# Patient Record
Sex: FEMALE | Race: BLACK OR AFRICAN AMERICAN | Employment: UNEMPLOYED | URBAN - METROPOLITAN AREA
[De-identification: names, ages, dates, MRNs, and addresses within clinical notes are randomized per-mention and may not be internally consistent; named-entity substitution may affect disease eponyms.]

---

## 2017-01-05 ENCOUNTER — TELEPHONE (OUTPATIENT)
Dept: INTERNAL MEDICINE CLINIC | Age: 47
End: 2017-01-05

## 2017-01-05 DIAGNOSIS — K13.0 CHEILITIS: Primary | ICD-10-CM

## 2017-01-06 RX ORDER — NYSTATIN AND TRIAMCINOLONE ACETONIDE 100000; 1 [USP'U]/G; MG/G
CREAM TOPICAL 2 TIMES DAILY
Qty: 15 G | Refills: 0 | Status: SHIPPED | OUTPATIENT
Start: 2017-01-06 | End: 2017-01-09 | Stop reason: SDUPTHER

## 2017-01-06 NOTE — TELEPHONE ENCOUNTER
PT aware of message below, she has tried chapstick and vaseline, she said its red and flaky, irritated. Can she try nystatin and triamc cream maybe? Angular cheilitis?

## 2017-01-09 DIAGNOSIS — K13.0 CHEILITIS: ICD-10-CM

## 2017-01-09 RX ORDER — NYSTATIN AND TRIAMCINOLONE ACETONIDE 100000; 1 [USP'U]/G; MG/G
CREAM TOPICAL 2 TIMES DAILY
Qty: 15 G | Refills: 0 | Status: SHIPPED | OUTPATIENT
Start: 2017-01-09 | End: 2019-03-22

## 2017-01-13 NOTE — TELEPHONE ENCOUNTER
Pt called stated she received a call from office today?   Pt would like to get script from pharmacy today

## 2017-01-13 NOTE — TELEPHONE ENCOUNTER
Called [facility], notified Mycolog II script for 15g is a 30 day supply. They have this ready, has been ready for 5 days for her. They has already approved it. LM with family member that her script is ready at [facility].

## 2017-01-22 RX ORDER — METFORMIN HYDROCHLORIDE 500 MG/1
TABLET, EXTENDED RELEASE ORAL
Qty: 180 TAB | Refills: 0 | Status: SHIPPED | OUTPATIENT
Start: 2017-01-22 | End: 2017-10-23 | Stop reason: SDUPTHER

## 2017-01-26 ENCOUNTER — TELEPHONE (OUTPATIENT)
Dept: INTERNAL MEDICINE CLINIC | Age: 47
End: 2017-01-26

## 2017-01-29 RX ORDER — METFORMIN HYDROCHLORIDE 500 MG/1
TABLET, EXTENDED RELEASE ORAL
Qty: 180 TAB | Refills: 0 | Status: SHIPPED | OUTPATIENT
Start: 2017-01-29 | End: 2017-10-23 | Stop reason: SDUPTHER

## 2017-03-31 ENCOUNTER — HOSPITAL ENCOUNTER (OUTPATIENT)
Dept: CT IMAGING | Age: 47
Discharge: HOME | End: 2017-03-31
Attending: OBSTETRICS & GYNECOLOGY
Payer: COMMERCIAL

## 2017-03-31 DIAGNOSIS — K40.90 INGUINAL HERNIA: ICD-10-CM

## 2017-03-31 LAB — CREAT UR-MCNC: 0.6 MG/DL (ref 0.6–1.3)

## 2017-03-31 PROCEDURE — 82565 ASSAY OF CREATININE: CPT

## 2017-03-31 PROCEDURE — 74011636320 HC RX REV CODE- 636/320: Performed by: OBSTETRICS & GYNECOLOGY

## 2017-03-31 PROCEDURE — 72193 CT PELVIS W/DYE: CPT

## 2017-03-31 RX ADMIN — IOPAMIDOL 100 ML: 612 INJECTION, SOLUTION INTRAVENOUS at 11:00

## 2017-07-06 ENCOUNTER — TELEPHONE (OUTPATIENT)
Dept: INTERNAL MEDICINE CLINIC | Age: 47
End: 2017-07-06

## 2017-07-06 NOTE — TELEPHONE ENCOUNTER
Backordered- can't get [id number]- can they split and do atenolol 50mg  And take 2 - and chlorthalidone dispensed  Separately. Please advise.

## 2017-07-07 RX ORDER — ATENOLOL AND CHLORTHALIDONE TABLET 100; 25 MG/1; MG/1
TABLET ORAL
Qty: 90 TAB | Refills: 3 | Status: SHIPPED | OUTPATIENT
Start: 2017-07-07 | End: 2017-11-10 | Stop reason: SDUPTHER

## 2017-07-07 RX ORDER — POTASSIUM CHLORIDE 1500 MG/1
TABLET, EXTENDED RELEASE ORAL
Qty: 90 TAB | Refills: 0 | Status: SHIPPED | OUTPATIENT
Start: 2017-07-07 | End: 2017-10-23 | Stop reason: SDUPTHER

## 2017-07-10 RX ORDER — POTASSIUM CHLORIDE 1500 MG/1
TABLET, EXTENDED RELEASE ORAL
Qty: 90 TAB | Refills: 0 | Status: SHIPPED | OUTPATIENT
Start: 2017-07-10 | End: 2017-10-23 | Stop reason: SDUPTHER

## 2017-07-11 NOTE — TELEPHONE ENCOUNTER
Spoke with pharmacist at [facility]. Called in script for Atenolol 50mg, take 2 daily, #180 and Chlorthalidone 25mg, take 1 daily #90. No refills as hopefully medication will come in within the next 90 days and patient can go back to taking combination pill.

## 2017-07-11 NOTE — TELEPHONE ENCOUNTER
[facility]:   Tenoretic 100-25mg is on backorder. Atenolol 100mg is on backorder. Can Atenolol 50mg 2QD #180 tabs and Chlorthalidone 25mg 1QD #90 tabs instead?

## 2017-10-16 ENCOUNTER — TELEPHONE (OUTPATIENT)
Dept: INTERNAL MEDICINE CLINIC | Age: 47
End: 2017-10-16

## 2017-10-16 ENCOUNTER — OFFICE VISIT (OUTPATIENT)
Dept: INTERNAL MEDICINE CLINIC | Age: 47
End: 2017-10-16

## 2017-10-16 DIAGNOSIS — Z12.31 SCREENING MAMMOGRAM, ENCOUNTER FOR: ICD-10-CM

## 2017-10-19 RX ORDER — CHLORTHALIDONE 25 MG/1
TABLET ORAL
Qty: 90 TAB | Refills: 0 | Status: SHIPPED | OUTPATIENT
Start: 2017-10-19 | End: 2018-01-24 | Stop reason: SDUPTHER

## 2017-10-19 RX ORDER — GLIPIZIDE 10 MG/1
TABLET ORAL
Qty: 180 TAB | Refills: 3 | Status: SHIPPED | OUTPATIENT
Start: 2017-10-19 | End: 2018-03-23 | Stop reason: ALTCHOICE

## 2017-10-23 ENCOUNTER — OFFICE VISIT (OUTPATIENT)
Dept: INTERNAL MEDICINE CLINIC | Age: 47
End: 2017-10-23

## 2017-10-23 VITALS
HEART RATE: 93 BPM | BODY MASS INDEX: 37.73 KG/M2 | HEIGHT: 64 IN | RESPIRATION RATE: 14 BRPM | SYSTOLIC BLOOD PRESSURE: 152 MMHG | DIASTOLIC BLOOD PRESSURE: 96 MMHG | TEMPERATURE: 98.9 F | WEIGHT: 221 LBS | OXYGEN SATURATION: 99 %

## 2017-10-23 DIAGNOSIS — E78.5 DYSLIPIDEMIA: ICD-10-CM

## 2017-10-23 DIAGNOSIS — J45.30 MILD PERSISTENT ASTHMA WITHOUT COMPLICATION: ICD-10-CM

## 2017-10-23 DIAGNOSIS — G47.33 OSA (OBSTRUCTIVE SLEEP APNEA): ICD-10-CM

## 2017-10-23 DIAGNOSIS — I10 ESSENTIAL HYPERTENSION: ICD-10-CM

## 2017-10-23 DIAGNOSIS — E66.9 OBESITY (BMI 30-39.9): ICD-10-CM

## 2017-10-23 DIAGNOSIS — Z00.00 PHYSICAL EXAM: Primary | ICD-10-CM

## 2017-10-23 DIAGNOSIS — F32.A DEPRESSION, UNSPECIFIED DEPRESSION TYPE: ICD-10-CM

## 2017-10-23 DIAGNOSIS — E55.9 HYPOVITAMINOSIS D: ICD-10-CM

## 2017-10-23 RX ORDER — AMLODIPINE BESYLATE 5 MG/1
5 TABLET ORAL DAILY
Qty: 30 TAB | Refills: 5 | Status: SHIPPED | OUTPATIENT
Start: 2017-10-23 | End: 2017-10-25 | Stop reason: DRUGHIGH

## 2017-10-23 RX ORDER — LANOLIN ALCOHOL/MO/W.PET/CERES
1000 CREAM (GRAM) TOPICAL DAILY
COMMUNITY
End: 2019-03-22

## 2017-10-23 RX ORDER — BUDESONIDE AND FORMOTEROL FUMARATE DIHYDRATE 160; 4.5 UG/1; UG/1
2 AEROSOL RESPIRATORY (INHALATION) 2 TIMES DAILY
Qty: 1 INHALER | Refills: 12 | Status: SHIPPED | OUTPATIENT
Start: 2017-10-23 | End: 2017-10-26 | Stop reason: CLARIF

## 2017-10-23 RX ORDER — ATENOLOL 100 MG/1
100 TABLET ORAL DAILY
COMMUNITY
End: 2017-11-10 | Stop reason: SDUPTHER

## 2017-10-23 RX ORDER — ZINC GLUCONATE 10 MG
250 LOZENGE ORAL DAILY
COMMUNITY
End: 2018-07-01

## 2017-10-23 RX ORDER — ALBUTEROL SULFATE 90 UG/1
2 AEROSOL, METERED RESPIRATORY (INHALATION)
Qty: 1 INHALER | Refills: 11 | Status: SHIPPED | OUTPATIENT
Start: 2017-10-23 | End: 2018-06-21 | Stop reason: SDUPTHER

## 2017-10-23 NOTE — PROGRESS NOTES
1. Have you been to the ER, urgent care clinic or hospitalized since your last visit? NO.     2. Have you seen or consulted any other health care providers outside of the [address] [place] [address] since your last visit (Include any pap smears or colon screening)? NO      Do you have an Advanced Directive? NO    Would you like information on Advanced Directives?  NO

## 2017-10-23 NOTE — MR AVS SNAPSHOT
Visit Information Date & Time Provider Department Dept. Phone Encounter #  
 10/23/2017  3:30 PM Alfred Albert MD [facility] [phone] Your Appointments 11/10/2017  3:15 PM  
Office Visit with Alfred Albert MD  
[facility]) Appt Note: 2 weeks [address], [address] [place] [address] [address]  
  
   
 [address], [address] Upcoming Health Maintenance Date Due Pneumococcal 19-64 Medium Risk (1 of 1 - PPSV23) 12/15/1989 DTaP/Tdap/Td series (1 - Tdap) 12/15/1991 FOOT EXAM Q1 9/15/2015 EYE EXAM RETINAL OR DILATED Q1 9/18/2015 PAP AKA CERVICAL CYTOLOGY 2/4/2016 HEMOGLOBIN A1C Q6M 1/1/2017 MICROALBUMIN Q1 7/1/2017 LIPID PANEL Q1 7/1/2017 INFLUENZA AGE 9 TO ADULT 8/1/2017 COLONOSCOPY 6/1/2020 Allergies as of 10/23/2017  Review Complete On: 10/23/2017 By: Shara Chavarria Severity Noted Reaction Type Reactions Penicillins High 10/03/2011    Unknown (comments) Tongue swelling; difficulty swallowing; thrush Compazine [Prochlorperazine]  10/03/2011    Unknown (comments) Paranoia Current Immunizations  Reviewed on 7/29/2013 No immunizations on file. Not reviewed this visit Vitals BP Pulse Temp Resp Height(growth percentile) Weight(growth percentile) (!) 160/110 (BP 1 Location: Right arm, BP Patient Position: Sitting) 93 98.9 °F (37.2 °C) (Oral) 14 5' 4\" (1.626 m) 221 lb (100.2 kg) SpO2 BMI OB Status Smoking Status 99% 37.93 kg/m2 Having regular periods Never Smoker Vitals History BMI and BSA Data Body Mass Index Body Surface Area  
 37.93 kg/m 2 2.13 m 2 Preferred Pharmacy Pharmacy Name Phone [facility] [address] [place] [address] [phone] Your Updated Medication List  
  
   
 This list is accurate as of: 10/23/17  4:22 PM.  Always use your most recent med list.  
  
  
  
  
 albuterol 90 mcg/actuation inhaler Commonly known as:  PROVENTIL HFA, VENTOLIN HFA, PROAIR HFA Take 1-2 Puffs by inhalation every four (4) hours as needed for Wheezing. [employer] CONTOUR strip Generic drug:  glucose blood VI test strips Dx code 250.00  
  
 aspirin 81 mg chewable tablet Take 81 mg by mouth daily. atenolol-chlorthalidone 100-25 mg per tablet Commonly known as:  TENORETIC  
TAKE ONE TABLET BY MOUTH ONCE DAILY  
  
 atorvastatin 20 mg tablet Commonly known as:  LIPITOR  
TAKE ONE TABLET BY MOUTH ONCE DAILY BYDUREON 2 mg Serr Generic drug:  exenatide microspheres INJECT THE CONTENTS OF ONE SYRINGE SUBCUTANEOUSLY ONCE EVERY 7 DAYS  
  
 chlorthalidone 25 mg tablet Commonly known as:  HYGROTEN  
TAKE ONE TABLET BY MOUTH ONCE DAILY  
  
 citalopram 20 mg tablet Commonly known as:  CELEXA  
TAKE ONE TABLET BY MOUTH ONCE DAILY  
  
 cyanocobalamin 1,000 mcg tablet Take 1,000 mcg by mouth daily. glipiZIDE 10 mg tablet Commonly known as:  GLUCOTROL  
TAKE ONE TABLET BY MOUTH TWICE DAILY INVOKANA 300 mg tablet Generic drug:  canagliflozin TAKE ONE TABLET BY MOUTH ONCE DAILY  
  
 KLOR-CON M20 20 mEq tablet Generic drug:  potassium chloride TAKE ONE TABLET BY MOUTH ONCE DAILY losartan 100 mg tablet Commonly known as:  COZAAR  
TAKE ONE TABLET BY MOUTH ONCE DAILY  
  
 magnesium 250 mg Tab Take 250 mg by mouth daily. metFORMIN  mg tablet Commonly known as:  GLUCOPHAGE XR  
TAKE TWO TABLETS BY MOUTH EVERY DAY WITH  DINNER  
  
 nystatin-triamcinolone topical cream  
Commonly known as:  MYCOLOG II Apply  to affected area two (2) times a day. Introducing [facility]! Dear Jose Bunn: Thank you for requesting a MyChart account.   Our records indicate that you have previously registered for a [employer] account but its currently inactive. Please call our [employer] support line at [phone]. Additional Information If you have questions, please visit the Frequently Asked Questions section of the [employer] website at [email or web address] Remember, [employer] is NOT to be used for urgent needs. For medical emergencies, dial 911. Now available from your iPhone and Android! Please provide this summary of care documentation to your next provider. Your primary care clinician is listed as Lalito Hassan. If you have any questions after today's visit, please call [phone].

## 2017-10-23 NOTE — PROGRESS NOTES
55 y.o. BLACK OR  female who presents for evaluation. She has not f/u w us for about a year mainly due to very busy work schedule    Denies any cardiovascular complaints. She does not exercise much although she does take the stairs up to 7 floors at work    She came off the invokana due to recurring yeast infections. These promptly resolved after she stopped. She does not hav e meter so not checking. Diet is way off as they eat out a lot now that the kids are in college    Vitals 10/23/2017 2016 2016 2016 2016   Weight 221 lb 212 lb 3.2 oz 209 lb 212 lb 209 lb     She reports that she's taking her bp meds although we had to break up the atenolol/chlorthalidone due to availability issues.   No headaches, etc.  Has not been checking readings outside    No gi or gu sfx    She has noted some inc wheezing but reports that she ran out of alb and has not used symbicort in some time    Past Medical History:   Diagnosis Date    Acanthosis nigricans     Anxiety     Asthma     Depression     Diabetes mellitus ([address]     Heavy menses 3/12    s/p endometrial ablation 3/16    History of CVA (cerebrovascular accident)     no deficits    History of iron deficiency anemia     Hyperlipidemia     Hypertension     Hypovitaminosis D 10/11    Morbid obesity (HCC)     peak weight 215 lbs, bmi 38.1 from 6/15; delcined dharmesh suppressants, med suprervised wt loss, bariatric options    JOLIE (obstructive sleep apnea)     Dr. Turpin Russ cpap    Perennial allergic rhinitis      Past Surgical History:   Procedure Laterality Date    HX  SECTION      x2    HX COLONOSCOPY      Dr Enriqueta Hartman 6/10 negative    HX GI  6/10    neg EGD Dr Mary Godoy HX GYN      cyst removal    HX GYN      LEEP    HX HYSTEROSCOPY WITH ENDOMETRIAL ABLATION  3/16    Dr Juan Hirsch     Social History     Social History    Marital status:      Spouse name: N/A    Number of children: 2    Years of education: N/A     Occupational History    asst mgr [employer]      Social History Main Topics    Smoking status: Never Smoker    Smokeless tobacco: Never Used    Alcohol use Yes      Comment: social drinker    Drug use: No    Sexual activity: Not on file     Other Topics Concern    Not on file     Social History Narrative     Current Outpatient Prescriptions   Medication Sig    cyanocobalamin 1,000 mcg tablet Take 1,000 mcg by mouth daily.  magnesium 250 mg tab Take 250 mg by mouth daily.  chlorthalidone (HYGROTEN) 25 mg tablet TAKE ONE TABLET BY MOUTH ONCE DAILY    glipiZIDE (GLUCOTROL) 10 mg tablet TAKE ONE TABLET BY MOUTH TWICE DAILY    atenolol-chlorthalidone (TENORETIC) 100-25 mg per tablet TAKE ONE TABLET BY MOUTH ONCE DAILY    BYDUREON 2 mg serr INJECT THE CONTENTS OF ONE SYRINGE SUBCUTANEOUSLY ONCE EVERY 7 DAYS    citalopram (CELEXA) 20 mg tablet TAKE ONE TABLET BY MOUTH ONCE DAILY    atorvastatin (LIPITOR) 20 mg tablet TAKE ONE TABLET BY MOUTH ONCE DAILY    losartan (COZAAR) 100 mg tablet TAKE ONE TABLET BY MOUTH ONCE DAILY    albuterol (PROVENTIL HFA, VENTOLIN HFA, PROAIR HFA) 90 mcg/actuation inhaler Take 1-2 Puffs by inhalation every four (4) hours as needed for Wheezing.  aspirin 81 mg chewable tablet Take 81 mg by mouth daily.  KLOR-CON M20 20 mEq tablet TAKE ONE TABLET BY MOUTH ONCE DAILY    metFORMIN ER (GLUCOPHAGE XR) 500 mg tablet TAKE TWO TABLETS BY MOUTH EVERY DAY WITH  DINNER    nystatin-triamcinolone (MYCOLOG II) topical cream Apply  to affected area two (2) times a day.  INVOKANA 300 mg tablet TAKE ONE TABLET BY MOUTH ONCE DAILY    ASCENSIA CONTOUR strip Dx code 250.00     No current facility-administered medications for this visit.       Allergies   Allergen Reactions    Penicillins Unknown (comments)     Tongue swelling; difficulty swallowing; thrush    Compazine [Prochlorperazine] Unknown (comments)     Paranoia     REVIEW OF SYSTEMS: sees  Adam, saw FNP Vance Citrin 1/14, mammo 2/13  Ophtho - no vision change or eye pain  Oral - no mouth pain, tongue or tooth problems  Ears - no hearing loss, ear pain, fullness, no swallowing problems  Cardiac - no CP, PND, orthopnea, edema, palpitations or syncope  Chest - no breast masses  Resp - no wheezing, chronic coughing, dyspnea  GI - no heartburn, nausea, vomiting, change in bowel habits, bleeding, hemorrhoids  Urinary - no dysuria, hematuria, flank pain, urgency, frequency  Genitals - no genital lesions, discharge, masses, ulceration, warts  Ortho - no swelling, dec ROM, myalgias  Psych - denies any anxiety or depression symptoms, no hallucinations or violent ideation  Constitutional - no wt loss, night sweats, unexplained fevers  Neuro - no focal weakness, numbness, paresthesias, incoordination, ataxia, involuntary movements  Endo - no polyuria, polydipsia, nocturia, hot flashes    Visit Vitals    BP (!) 152/96 (BP 1 Location: Right arm, BP Patient Position: Sitting)    Pulse 93    Temp 98.9 °F (37.2 °C) (Oral)    Resp 14    Ht 5' 4\" (1.626 m)    Wt 221 lb (100.2 kg)    SpO2 99%    BMI 37.93 kg/m2     A&O x3  Affect is appropriate. Mood stable  No apparent distress  Anicteric, no JVD, adenopathy or thyromegaly. No carotid bruits or radiated murmur  Lungs clear to auscultation, no wheezes or rales  Heart showed regular rate and rhythm. No murmur, rubs, gallops  Abdomen soft nontender, no hepatosplenomegaly or masses. Extremities without edema.   Pulses 1-2+ symmetrically    LABS  From 10/11 showed                   hba1c 11.1, ldl-p 2182, chol 166, tg 139, hdl 39, ldl-c 99,                    vit d 16.5  From 12/11 showed gluc 111, cr 0.68,              alt10,                      ldl-p 1980, chol 178, tg 104, hdl 40, ldl-c 117  From 3/12 showed                   hba1c 6.4,                  wbc 4.4, hb 11.0, plt 306, %sat 10, ferritin 20, spep neg, fol 12.5, b12 665, vit d 41.6  From 11/12 showed gluc 196, cr 0.50,              alt 27, hba1c 9.1,   ldl-p 1745, chol 154, tg 146, hdl 41, ldl-c 84  From 7/13 showed   gluc 157, cr 0.50, gfr>60, alt 18, hba1c 8.8,   ldl-p 1017, chol 119, tg 102, hdl 39, ldl-c 60  From 8/14 showed   gluc 137, cr 0.50, gfr>60, alt 15, hba1c 7.3,       chol 118, tg 92,   hdl 34, ldl-c 66,  wbc 3.1, hb 8.9,   plt 236, umar 17.6  From 12/15 showed gluc 124, cr 0.60, gfr>60, alt 23, hba1c 6.7,       chol 120, tg 161, hdl 34, ldl-c 54,  wbc 4.5, hb 10.2, plt 274, vit d 13.9, tsh 2.12  From 5/16 showed   gluc 121, cr 0.70, gfr>60, alt 27  From 7/16 showed        hba1c 7.4,       chol 134, tg 127, hdl 40, ldl-c 69    Patient Active Problem List   Diagnosis Code    Asthma J45.909    Depression and anxiety F32.9    JOLIE and PLMD Dr. Fern Schirmer 2005 G47.33    Hypovitaminosis D E55.9    Dyslipidemia E78.5    Essential hypertension I10    Diabetes mellitus type 2, uncontrolled ([address] E11.65    Obesity (BMI 30-39. 9) E66.9     Assessment and plan:  1. Diabetes. Check labs, tighten up diet, inc exercise, try and lose weight. F/U ophth. Will likely add actos assuming insulin not indicated  2. Hyperlipidemia. Check labs  3. Hypovitaminosis D. Restart supplementation  4. Sleep apnea. F/u Dr. Fern Schirmer  5. Morbid obesity. Not interested in medically supervised wt loss or dharmesh suppressants. 6. Hypertension. Continue current but will add amlo after discussing possible sfx. Sfx discussed  7. Asthma. Restart symbicort, refilled albuterol  8. Referral to Dr Palak Soto given  9. F/U gyn, mammo ordered  10. PVX to be given         RTC 2/18    Above conditions discussed at length and patient vocalized understanding.   All questions answered to patient satifaction

## 2017-10-24 ENCOUNTER — TELEPHONE (OUTPATIENT)
Dept: INTERNAL MEDICINE CLINIC | Age: 47
End: 2017-10-24

## 2017-10-24 ENCOUNTER — HOSPITAL ENCOUNTER (OUTPATIENT)
Dept: LAB | Age: 47
Discharge: HOME | End: 2017-10-24

## 2017-10-24 LAB
AVG GLU, 10930: 216 MG/DL (ref 91–123)
CREATININE, URINE: 211 MG/DL
ERYTHROCYTE [DISTWIDTH] IN BLOOD BY AUTOMATED COUNT: 16.4 % (ref 10–16)
HBA1C MFR BLD HPLC: 9.1 % (ref 4.8–5.9)
HCT VFR BLD AUTO: 35.6 % (ref 35.1–48)
HGB BLD-MCNC: 10.4 G/DL (ref 11.7–16)
MCH RBC QN AUTO: 22 PG (ref 26–34)
MCHC RBC AUTO-ENTMCNC: 29 G/DL (ref 32–36)
MCV RBC AUTO: 76 FL (ref 80–95)
MICROALB/CREAT RATIO, 140286: 72.5 MCG/MG OF CREATININE (ref 0–30)
MICROALBUMIN,URINE RANDOM 140054: 153 UG/ML (ref 0.1–17)
PLATELET # BLD AUTO: 245 K/UL (ref 140–440)
PMV BLD AUTO: 12 FL (ref 6–10.8)
RBC # BLD AUTO: 4.68 M/UL (ref 3.8–5.2)
SENTARA SPECIMEN COL,SENBCF: NORMAL
WBC # BLD AUTO: 3.5 K/UL (ref 4–11)

## 2017-10-24 PROCEDURE — 99001 SPECIMEN HANDLING PT-LAB: CPT | Performed by: INTERNAL MEDICINE

## 2017-10-24 NOTE — TELEPHONE ENCOUNTER
PT called- her work is sending her home because her BP is 168/106- per Eamon Johnson have her start the new BP meds, lay down for awhile and then retake her BP and call us with the results.   They took it a 2nd time at work and it was 164/106

## 2017-10-24 NOTE — TELEPHONE ENCOUNTER
Does not have cuff at home-can't be released back to work till she has it checked and RD says ok- per RD I scheduled with Lopez Cheung for tomorrow

## 2017-10-24 NOTE — TELEPHONE ENCOUNTER
----- Message from Jake Cartagena MD sent at 10/23/2017  6:01 PM EDT -----  pls get gyn records cyrus rowe

## 2017-10-25 ENCOUNTER — OFFICE VISIT (OUTPATIENT)
Dept: INTERNAL MEDICINE CLINIC | Age: 47
End: 2017-10-25

## 2017-10-25 VITALS
TEMPERATURE: 98.3 F | HEIGHT: 64 IN | SYSTOLIC BLOOD PRESSURE: 168 MMHG | WEIGHT: 221 LBS | BODY MASS INDEX: 37.73 KG/M2 | HEART RATE: 85 BPM | DIASTOLIC BLOOD PRESSURE: 110 MMHG | OXYGEN SATURATION: 99 % | RESPIRATION RATE: 14 BRPM

## 2017-10-25 DIAGNOSIS — E66.9 OBESITY (BMI 30-39.9): ICD-10-CM

## 2017-10-25 DIAGNOSIS — E78.5 DYSLIPIDEMIA: ICD-10-CM

## 2017-10-25 DIAGNOSIS — J45.20 MILD INTERMITTENT ASTHMA WITHOUT COMPLICATION: ICD-10-CM

## 2017-10-25 DIAGNOSIS — G47.33 OSA (OBSTRUCTIVE SLEEP APNEA): ICD-10-CM

## 2017-10-25 DIAGNOSIS — I10 ESSENTIAL HYPERTENSION: Primary | ICD-10-CM

## 2017-10-25 LAB
25(OH)D3 SERPL-MCNC: 12.3 NG/ML (ref 32–100)
A-G RATIO,AGRAT: 1.3 RATIO (ref 1.1–2.6)
ALBUMIN SERPL-MCNC: 4.2 G/DL (ref 3.5–5)
ALP SERPL-CCNC: 77 U/L (ref 25–115)
ALT SERPL-CCNC: 32 U/L (ref 5–40)
ANION GAP SERPL CALC-SCNC: 18 MMOL/L
AST SERPL W P-5'-P-CCNC: 21 U/L (ref 10–37)
BILIRUB SERPL-MCNC: 0.2 MG/DL (ref 0.2–1.2)
BUN SERPL-MCNC: 13 MG/DL (ref 6–22)
CALCIUM SERPL-MCNC: 9.3 MG/DL (ref 8.4–10.5)
CHLORIDE SERPL-SCNC: 94 MMOL/L (ref 98–110)
CHOLEST SERPL-MCNC: 143 MG/DL (ref 110–200)
CO2 SERPL-SCNC: 25 MMOL/L (ref 20–32)
CREAT SERPL-MCNC: 0.6 MG/DL (ref 0.5–1.2)
GFRAA, 66117: >60
GFRNA, 66118: >60
GLOBULIN,GLOB: 3.2 G/DL (ref 2–4)
GLUCOSE SERPL-MCNC: 260 MG/DL (ref 70–99)
HDLC SERPL-MCNC: 33 MG/DL (ref 40–59)
LDLC SERPL CALC-MCNC: 74 MG/DL (ref 50–99)
POTASSIUM SERPL-SCNC: 3.5 MMOL/L (ref 3.5–5.5)
PROT SERPL-MCNC: 7.4 G/DL (ref 6.4–8.3)
SODIUM SERPL-SCNC: 137 MMOL/L (ref 133–145)
TRIGL SERPL-MCNC: 180 MG/DL (ref 40–149)
VLDLC SERPL CALC-MCNC: 36 MG/DL (ref 8–30)

## 2017-10-25 RX ORDER — AMLODIPINE BESYLATE 5 MG/1
10 TABLET ORAL DAILY
COMMUNITY
End: 2017-11-08 | Stop reason: SDUPTHER

## 2017-10-25 RX ORDER — METFORMIN HYDROCHLORIDE 500 MG/1
1000 TABLET, EXTENDED RELEASE ORAL
Qty: 180 TAB | Refills: 0 | Status: SHIPPED | OUTPATIENT
Start: 2017-10-25 | End: 2020-10-29

## 2017-10-25 NOTE — LETTER
NOTIFICATION RETURN TO WORK / SCHOOL 
 
10/25/2017 11:39 AM 
 
Ms. Maritza Ramon [address] [place] [address],[address] [facility] [id number] To Whom It May Concern: 
 
Maritza Ramon is currently under the care of Kelley Denis. Pt is medically stable to return to work. If there are questions or concerns please have the patient contact our office. Sincerely, EFRAÍN Alexandra

## 2017-10-25 NOTE — PROGRESS NOTES
1. Have you been to the ER, urgent care clinic or hospitalized since your last visit? NO.     2. Have you seen or consulted any other health care providers outside of the [address] [place] [address] since your last visit (Include any pap smears or colon screening)? NO      Do you have an Advanced Directive? NO    Would you like information on Advanced Directives?  NO

## 2017-10-25 NOTE — PROGRESS NOTES
HPI/History  Brooks Diana is a 55 y.o.  female who presents for evaluation. Pt accompanied by . Pt with HTN and recently undergoing adjustments due to elevated pressures. Saw Dr. Julia Lopez 10/23 where norvasc 5mg was added to her regimen of atenolol, chlorthalidone, and losartan. Pt is still using atenolol/chlorthalidone as a combo and HAS NOT had availability issues as of yet per report; dosing noted below, including losartan dosing. Pt started norvasc yesterday. Was sent home from work due to BP of 168/106 then 164/106. She needs evaluation and note in order to return. She denies any cardiac complaints. Has had some wheezing for which albuterol and symbicort were provided, she has used these in the past but has not required in some time. She was able to fill albuterol but reports encountering insurance issues with symbicort but details unknown. No other sxs or complaints. She has a previously scheduled f/u including review of recent labs with Dr. Julia Lopez on 11/10. Of note, lifestyle and diet could use improvement. Pt with weight gain. Admits to salt laden meals and adding salt to meals. Patient Active Problem List   Diagnosis Code    Asthma J45.909    Depression and anxiety F32.9    JOLIE and PLMD Dr. Evelin Posada 2005 G47.33    Hypovitaminosis D E55.9    Dyslipidemia E78.5    Essential hypertension I10    Diabetes mellitus type 2, uncontrolled ([facility] E11.65    Obesity (BMI 30-39. 9) E66.9     Past Medical History:   Diagnosis Date    Acanthosis nigricans 7/13    Anxiety     Asthma     Depression     Diabetes mellitus ([facility]     Heavy menses 3/12    s/p endometrial ablation 3/16    History of CVA (cerebrovascular accident) 1996    no deficits    History of iron deficiency anemia     Hyperlipidemia     Hypertension     Hypovitaminosis D 10/11    Morbid obesity (HCC)     peak weight 215 lbs, bmi 38.1 from 6/15; delcined dharmesh suppressants, med suprervised wt loss, bariatric options    JOLIE (obstructive sleep apnea)     Dr. Dania Pardo cpap    Perennial allergic rhinitis      Past Surgical History:   Procedure Laterality Date    HX  SECTION      x2    HX COLONOSCOPY      Dr Gilliland  6/10 negative    HX GI  6/10    neg EGD Dr Chan Hicks HX GYN      cyst removal    HX GYN      LEEP    HX HYSTEROSCOPY WITH ENDOMETRIAL ABLATION  3/16    Dr Barney Frank     Social History     Social History    Marital status:      Spouse name: N/A    Number of children: 2    Years of education: N/A     Occupational History    works at [employer] History Main Topics    Smoking status: Never Smoker    Smokeless tobacco: Never Used    Alcohol use Yes      Comment: social drinker    Drug use: No    Sexual activity: Not on file     Other Topics Concern    Not on file     Social History Narrative     Family History   Problem Relation Age of Onset    Diabetes Mother      Current Outpatient Prescriptions   Medication Sig    cyanocobalamin 1,000 mcg tablet Take 1,000 mcg by mouth daily.  magnesium 250 mg tab Take 250 mg by mouth daily.  amLODIPine (NORVASC) 5 mg tablet Take 1 Tab by mouth daily.  budesonide-formoterol (SYMBICORT) 160-4.5 mcg/actuation HFAA Take 2 Puffs by inhalation two (2) times a day.  albuterol (PROVENTIL HFA, VENTOLIN HFA, PROAIR HFA) 90 mcg/actuation inhaler Take 2 Puffs by inhalation every six (6) hours as needed for Wheezing.  chlorthalidone (HYGROTEN) 25 mg tablet TAKE ONE TABLET BY MOUTH ONCE DAILY    glipiZIDE (GLUCOTROL) 10 mg tablet TAKE ONE TABLET BY MOUTH TWICE DAILY    atenolol-chlorthalidone (TENORETIC) 100-25 mg per tablet TAKE ONE TABLET BY MOUTH ONCE DAILY    BYDUREON 2 mg serr INJECT THE CONTENTS OF ONE SYRINGE SUBCUTANEOUSLY ONCE EVERY 7 DAYS    nystatin-triamcinolone (MYCOLOG II) topical cream Apply  to affected area two (2) times a day.     citalopram (CELEXA) 20 mg tablet TAKE ONE TABLET BY MOUTH ONCE DAILY    atorvastatin (LIPITOR) 20 mg tablet TAKE ONE TABLET BY MOUTH ONCE DAILY    losartan (COZAAR) 100 mg tablet TAKE ONE TABLET BY MOUTH ONCE DAILY    aspirin 81 mg chewable tablet Take 81 mg by mouth daily.  KLOR-CON M20 20 mEq tablet TAKE ONE TABLET BY MOUTH ONCE DAILY    metFORMIN ER (GLUCOPHAGE XR) 500 mg tablet TAKE TWO TABLETS BY MOUTH EVERY DAY WITH  DINNER    atenolol (TENORMIN) 100 mg tablet Take 100 mg by mouth daily. No current facility-administered medications for this visit. Allergies   Allergen Reactions    Penicillins Unknown (comments)     Tongue swelling; difficulty swallowing; thrush    Compazine [Prochlorperazine] Unknown (comments)     Paranoia    Invokana [Canagliflozin] Other (comments)     Yeast infections       Review of Systems  Aside from those included in HPI, remainder of complete ROS negative. Physical Examination  Visit Vitals    BP (!) 168/110    Pulse 85    Temp 98.3 °F (36.8 °C)    Resp 14    Ht 5' 4\" (1.626 m)    Wt 221 lb (100.2 kg)    SpO2 99%    BMI 37.93 kg/m2       General - Alert and in no acute distress. Pt appears well, comfortable, and in good spirits. Pleasant, engaging. Nontoxic. Not anxious, non-diaphoretic. Mental status - Appropriate mood, behavior, speech content, dress, and thought processes. Eyes - Pupils equal and reactive, extraocular movements intact. No erythema or discharge. Pulm - No tachypnea, retractions, or cyanosis. Good respiratory effort. Clear to auscultation bilat. No appreciable wheezes, rales, or rhonchi. Cardiovascular - Normal rate, regular rhythm. No appreciable murmurs or gallops. Assessment and Plan  1. HTN with recent elevations - Continue atenolol, chlorthalidone, and losartan. Use combo atenolol/chlorathalidone or as individuals as availability dictates. She only started norvasc yesterday but will likely require increased dose. She will increase to 10mg norvasc daily. She will monitor BP.  She will work on [address] including diet, sodium intake, and wt loss. She will keep previously scheduled appt with lab review and recheck with Dr. Agnes Nails on 11/10. I have provided a return to work note. 2. Obesity and weight related comorbidities (see problem list) - TLC and wt loss discussed at length along with relation to other conditions. 3. Asthma - able to fill albuterol but encountered issues (insurance) with symbicort. Will have nurses look into the issue. Further planning as warranted. Pt and  happily agree with plan. PLEASE NOTE:   This document has been produced using voice recognition software. Unrecognized errors in transcription may be present.     Page Dalia [employer] Prepared Response [facility]  [phone]  10/25/2017

## 2017-10-25 NOTE — MR AVS SNAPSHOT
Visit Information Date & Time Provider Department Dept. Phone Encounter #  
 10/25/2017 11:30 AM Eugene Ceron [facility] [address] [place] [address] [phone] Your Appointments 11/10/2017  3:15 PM  
Office Visit with Wendy Akers MD  
[facility] of [address] [place] [address] [facility]) Appt Note: 2 weeks [address], [address] [address] [address] [place]  
  
   
 [address], [address] Upcoming Health Maintenance Date Due Pneumococcal 19-64 Medium Risk (1 of 1 - PPSV23) 12/15/1989 DTaP/Tdap/Td series (1 - Tdap) 12/15/1991 FOOT EXAM Q1 9/15/2015 EYE EXAM RETINAL OR DILATED Q1 9/18/2015 PAP AKA CERVICAL CYTOLOGY 2/4/2016 HEMOGLOBIN A1C Q6M 1/1/2017 MICROALBUMIN Q1 7/1/2017 LIPID PANEL Q1 7/1/2017 COLONOSCOPY 6/1/2020 Allergies as of 10/25/2017  Review Complete On: 10/25/2017 By: EFRAÍN Ceron Severity Noted Reaction Type Reactions Penicillins High 10/03/2011    Unknown (comments) Tongue swelling; difficulty swallowing; thrush Compazine [Prochlorperazine]  10/03/2011    Unknown (comments) Paranoia Invokana [Canagliflozin]  06/20/2016    Other (comments) Yeast infections Current Immunizations  Reviewed on 10/23/2017 Name Date Influenza Vaccine 10/1/2017, 10/1/2016, 10/1/2015 Not reviewed this visit Vitals BP Pulse Temp Resp Height(growth percentile) Weight(growth percentile) (!) 168/110 85 98.3 °F (36.8 °C) 14 5' 4\" (1.626 m) 221 lb (100.2 kg) SpO2 BMI OB Status Smoking Status 99% 37.93 kg/m2 Having regular periods Never Smoker Vitals History BMI and BSA Data Body Mass Index Body Surface Area  
 37.93 kg/m 2 2.13 m 2 Preferred Pharmacy Pharmacy Name Phone [facility] [address] [place] [address], [address] [place] [address] Avenue [phone] Your Updated Medication List  
  
   
 This list is accurate as of: 10/25/17 11:43 AM.  Always use your most recent med list.  
  
  
  
  
 albuterol 90 mcg/actuation inhaler Commonly known as:  PROVENTIL HFA, VENTOLIN HFA, PROAIR HFA Take 2 Puffs by inhalation every six (6) hours as needed for Wheezing. amLODIPine 5 mg tablet Commonly known as:  Wana Soo Take 1 Tab by mouth daily. aspirin 81 mg chewable tablet Take 81 mg by mouth daily. atenolol 100 mg tablet Commonly known as:  TENORMIN Take 100 mg by mouth daily. atenolol-chlorthalidone 100-25 mg per tablet Commonly known as:  TENORETIC  
TAKE ONE TABLET BY MOUTH ONCE DAILY  
  
 atorvastatin 20 mg tablet Commonly known as:  LIPITOR  
TAKE ONE TABLET BY MOUTH ONCE DAILY  
  
 budesonide-formoterol 160-4.5 mcg/actuation Hfaa Commonly known as:  SYMBICORT Take 2 Puffs by inhalation two (2) times a day. BYDUREON 2 mg Serr Generic drug:  exenatide microspheres INJECT THE CONTENTS OF ONE SYRINGE SUBCUTANEOUSLY ONCE EVERY 7 DAYS  
  
 chlorthalidone 25 mg tablet Commonly known as:  HYGROTEN  
TAKE ONE TABLET BY MOUTH ONCE DAILY  
  
 citalopram 20 mg tablet Commonly known as:  CELEXA  
TAKE ONE TABLET BY MOUTH ONCE DAILY  
  
 cyanocobalamin 1,000 mcg tablet Take 1,000 mcg by mouth daily. glipiZIDE 10 mg tablet Commonly known as:  GLUCOTROL  
TAKE ONE TABLET BY MOUTH TWICE DAILY  
  
 KLOR-CON M20 20 mEq tablet Generic drug:  potassium chloride TAKE ONE TABLET BY MOUTH ONCE DAILY losartan 100 mg tablet Commonly known as:  COZAAR  
TAKE ONE TABLET BY MOUTH ONCE DAILY  
  
 magnesium 250 mg Tab Take 250 mg by mouth daily. metFORMIN  mg tablet Commonly known as:  GLUCOPHAGE XR  
TAKE TWO TABLETS BY MOUTH EVERY DAY WITH  DINNER  
  
 nystatin-triamcinolone topical cream  
Commonly known as:  MYCOLOG II Apply  to affected area two (2) times a day. Introducing [facility]!    
 Dear Gardner Riedel: 
 Thank you for requesting a [employer] account. Our records indicate that you have previously registered for a [employer] account but its currently inactive. Please call our [employer] support line at [phone]. Additional Information If you have questions, please visit the Frequently Asked Questions section of the [employer] website at [email or web address] Remember, CrowdTangle is NOT to be used for urgent needs. For medical emergencies, dial 911. Now available from your iPhone and Android! Please provide this summary of care documentation to your next provider. Your primary care clinician is listed as Livia Barone. If you have any questions after today's visit, please call [phone].

## 2017-10-26 ENCOUNTER — TELEPHONE (OUTPATIENT)
Dept: INTERNAL MEDICINE CLINIC | Age: 47
End: 2017-10-26

## 2017-10-26 DIAGNOSIS — I10 ESSENTIAL HYPERTENSION: Primary | ICD-10-CM

## 2017-10-26 DIAGNOSIS — J45.20 MILD INTERMITTENT ASTHMA WITHOUT COMPLICATION: Primary | ICD-10-CM

## 2017-10-26 RX ORDER — CLONIDINE HYDROCHLORIDE 0.1 MG/1
0.1 TABLET ORAL 2 TIMES DAILY
Qty: 90 TAB | Status: SHIPPED | OUTPATIENT
Start: 2017-10-26 | End: 2019-09-27

## 2017-10-26 RX ORDER — FLUTICASONE FUROATE AND VILANTEROL 200; 25 UG/1; UG/1
1 POWDER RESPIRATORY (INHALATION) DAILY
Qty: 1 INHALER | Refills: 11 | Status: SHIPPED | OUTPATIENT
Start: 2017-10-26 | End: 2018-06-21 | Stop reason: SDUPTHER

## 2017-10-26 NOTE — TELEPHONE ENCOUNTER
norvasc usually doesn't cause nausea  Check bp and if very high, might have to go to ER for eval  We can try clonidine to get the pressure down   Schedule ov w bm or sl soon pls to f/u

## 2017-10-26 NOTE — TELEPHONE ENCOUNTER
Spoke with patient, she has NOT checked her BP today. She said she hasnt even taken her norvasc today because she felt so bad. She has a headache, nauseas but no active vomiting. I advised her to check her BP, if its higher than 160/110, she should go to the ER for an eval to rule out HTN urgency per RD. If her BP is below that level, she can switch her meds from Norvasc to Clonidine which was sent to [facility] on file. If her headache worsens or she develops vomiting she should go to the ER for evaluation per RD, pt verbalized understanding.  She will give us a call back with her BP readings if she gets them done before 5pm, if not she will call tomorrow with an update

## 2017-10-26 NOTE — TELEPHONE ENCOUNTER
Patient has a really bad headache and nausea. To the point of almost being in tears. She believes it is the Norvasc that is causing. She wants to know if she should stop taking or decrease the dose. Please advise.

## 2017-10-29 RX ORDER — LANCETS
EACH MISCELLANEOUS
Qty: 100 EACH | Refills: 3 | Status: SHIPPED | OUTPATIENT
Start: 2017-10-29

## 2017-10-29 RX ORDER — INSULIN PUMP SYRINGE, 3 ML
EACH MISCELLANEOUS
Qty: 1 KIT | Refills: 0 | Status: SHIPPED | OUTPATIENT
Start: 2017-10-29

## 2017-10-30 ENCOUNTER — TELEPHONE (OUTPATIENT)
Dept: INTERNAL MEDICINE CLINIC | Age: 47
End: 2017-10-30

## 2017-10-30 NOTE — TELEPHONE ENCOUNTER
Patient stating she is having some inner ear problems. She has some pressure and can't hear out of it. She wants to know if something can be called in. Does not want to make an appt.

## 2017-10-31 NOTE — TELEPHONE ENCOUNTER
[address] to let pt know that Dr. Jeramy Justin recommends using Zyrtec D otc, she can also try Flonase nasal spray as these two meds oftentimes used together can help with inner ear symptoms. If she has tried these, then he recommends she see ENT, Dr. Jacquelyn Hunter is who we refer to.

## 2017-11-01 NOTE — TELEPHONE ENCOUNTER
Pt ret call, adv her of message from SHALINI, she will try the 2 meds and if problem is not solved she will call back , SHALINI

## 2017-11-08 RX ORDER — AMLODIPINE BESYLATE 10 MG/1
10 TABLET ORAL DAILY
Qty: 90 TAB | Refills: 0 | Status: SHIPPED | OUTPATIENT
Start: 2017-11-08 | End: 2018-02-14 | Stop reason: SDUPTHER

## 2017-11-08 NOTE — TELEPHONE ENCOUNTER
Patient dosage increase at the time of the last office visit. Assessment and Plan  . Jamil Sibley She only started norvasc yesterday but will likely require increased dose. She will increase to 10mg norvasc daily. .. Last Visit: 10/25/2017 with EFRAÍN Aguilera    Next Appointment: 11/10/2017 per MD Toney Pineda     Requested Prescriptions     Pending Prescriptions Disp Refills    amLODIPine (NORVASC) 10 mg tablet 90 Tab 0     Sig: Take 1 Tab by mouth daily.

## 2017-11-10 ENCOUNTER — OFFICE VISIT (OUTPATIENT)
Dept: INTERNAL MEDICINE CLINIC | Age: 47
End: 2017-11-10

## 2017-11-10 VITALS
SYSTOLIC BLOOD PRESSURE: 138 MMHG | OXYGEN SATURATION: 99 % | BODY MASS INDEX: 37.05 KG/M2 | HEIGHT: 64 IN | HEART RATE: 71 BPM | RESPIRATION RATE: 14 BRPM | DIASTOLIC BLOOD PRESSURE: 84 MMHG | TEMPERATURE: 98.2 F | WEIGHT: 217 LBS

## 2017-11-10 DIAGNOSIS — G47.33 OSA (OBSTRUCTIVE SLEEP APNEA): ICD-10-CM

## 2017-11-10 DIAGNOSIS — D64.9 CHRONIC ANEMIA: ICD-10-CM

## 2017-11-10 DIAGNOSIS — E55.9 HYPOVITAMINOSIS D: ICD-10-CM

## 2017-11-10 DIAGNOSIS — E66.9 OBESITY (BMI 30-39.9): ICD-10-CM

## 2017-11-10 DIAGNOSIS — E78.5 DYSLIPIDEMIA: ICD-10-CM

## 2017-11-10 DIAGNOSIS — J45.20 MILD INTERMITTENT ASTHMA WITHOUT COMPLICATION: ICD-10-CM

## 2017-11-10 DIAGNOSIS — I10 ESSENTIAL HYPERTENSION: ICD-10-CM

## 2017-11-10 RX ORDER — PIOGLITAZONEHYDROCHLORIDE 15 MG/1
TABLET ORAL
Qty: 30 TAB | Refills: 5 | Status: SHIPPED | OUTPATIENT
Start: 2017-11-10 | End: 2019-03-22

## 2017-11-10 RX ORDER — ATENOLOL 50 MG/1
100 TABLET ORAL DAILY
COMMUNITY
End: 2018-07-01

## 2017-11-10 RX ORDER — ERGOCALCIFEROL 1.25 MG/1
50000 CAPSULE ORAL
Qty: 13 CAP | Refills: 3 | Status: SHIPPED | OUTPATIENT
Start: 2017-11-10 | End: 2019-01-06 | Stop reason: SDUPTHER

## 2017-11-10 NOTE — PROGRESS NOTES
1. Have you been to the ER, urgent care clinic or hospitalized since your last visit? NO.     2. Have you seen or consulted any other health care providers outside of the [address] [place] [address] since your last visit (Include any pap smears or colon screening)? NO      Do you have an Advanced Directive? NO    Would you like information on Advanced Directives?  NO

## 2017-11-10 NOTE — MR AVS SNAPSHOT
Visit Information Date & Time Provider Department Dept. Phone Encounter #  
 11/10/2017  3:15 PM Bill Bruno MD [facility] [phone] [id number] Your Appointments 3/23/2018  2:45 PM  
Office Visit with Bill Bruno MD  
[facility]) Appt Note: 4 month follow up  
 [address] [facility], [address] [place] [address] [address] [place]  
  
   
 [address], [address] Upcoming Health Maintenance Date Due Pneumococcal 19-64 Medium Risk (1 of 1 - PPSV23) 12/15/1989 DTaP/Tdap/Td series (1 - Tdap) 12/15/1991 FOOT EXAM Q1 9/15/2015 EYE EXAM RETINAL OR DILATED Q1 9/18/2015 HEMOGLOBIN A1C Q6M 4/24/2018 MICROALBUMIN Q1 10/24/2018 LIPID PANEL Q1 10/24/2018 PAP AKA CERVICAL CYTOLOGY 2/24/2019 COLONOSCOPY 6/1/2020 Allergies as of 11/10/2017  Review Complete On: 11/10/2017 By: Syeda Dwyer Severity Noted Reaction Type Reactions Penicillins High 10/03/2011    Unknown (comments) Tongue swelling; difficulty swallowing; thrush Compazine [Prochlorperazine]  10/03/2011    Unknown (comments) Paranoia Invokana [Canagliflozin]  06/20/2016    Other (comments) Yeast infections Current Immunizations  Reviewed on 10/23/2017 Name Date Influenza Vaccine 10/1/2017, 10/1/2016, 10/1/2015 Not reviewed this visit Vitals BP Pulse Temp Resp Height(growth percentile) Weight(growth percentile) 138/84 (BP 1 Location: Right arm, BP Patient Position: Sitting) 71 98.2 °F (36.8 °C) (Oral) 14 5' 4\" (1.626 m) 217 lb (98.4 kg) SpO2 BMI OB Status Smoking Status 99% 37.25 kg/m2 Having regular periods Never Smoker Vitals History BMI and BSA Data Body Mass Index Body Surface Area  
 37.25 kg/m 2 2.11 m 2 Preferred Pharmacy Pharmacy Name Phone [facility] [address], [address] [place] [address] [phone] Your Updated Medication List  
  
   
This list is accurate as of: 11/10/17  4:19 PM.  Always use your most recent med list.  
  
  
  
  
 albuterol 90 mcg/actuation inhaler Commonly known as:  PROVENTIL HFA, VENTOLIN HFA, PROAIR HFA Take 2 Puffs by inhalation every six (6) hours as needed for Wheezing. amLODIPine 10 mg tablet Commonly known as:  Wandra Soo Take 1 Tab by mouth daily. aspirin 81 mg chewable tablet Take 81 mg by mouth daily. atenolol 50 mg tablet Commonly known as:  TENORMIN Take 100 mg by mouth daily. atorvastatin 20 mg tablet Commonly known as:  LIPITOR  
TAKE ONE TABLET BY MOUTH ONCE DAILY Blood-Glucose Meter monitoring kit Pt checks blood sugar once daily, Dx E11.9 BYDUREON 2 mg Serr Generic drug:  exenatide microspheres INJECT THE CONTENTS OF ONE SYRINGE SUBCUTANEOUSLY ONCE EVERY 7 DAYS  
  
 chlorthalidone 25 mg tablet Commonly known as:  HYGROTEN  
TAKE ONE TABLET BY MOUTH ONCE DAILY  
  
 citalopram 20 mg tablet Commonly known as:  CELEXA  
TAKE ONE TABLET BY MOUTH ONCE DAILY  
  
 cloNIDine HCl 0.1 mg tablet Commonly known as:  CATAPRES Take 1 Tab by mouth two (2) times a day. cyanocobalamin 1,000 mcg tablet Take 1,000 mcg by mouth daily. fluticasone-vilanterol 200-25 mcg/dose inhaler Commonly known as:  BREO ELLIPTA Take 1 Puff by inhalation daily. glipiZIDE 10 mg tablet Commonly known as:  GLUCOTROL  
TAKE ONE TABLET BY MOUTH TWICE DAILY  
  
 glucose blood VI test strips strip Commonly known as:  blood glucose test  
Pt checks blood sugar once daily, Dx e11.9 KLOR-CON M20 20 mEq tablet Generic drug:  potassium chloride TAKE ONE TABLET BY MOUTH ONCE DAILY Lancets Misc Pt checks blood sugar once daily, Dx E11.9  
  
 losartan 100 mg tablet Commonly known as:  COZAAR  
 TAKE ONE TABLET BY MOUTH ONCE DAILY  
  
 magnesium 250 mg Tab Take 250 mg by mouth daily. metFORMIN  mg tablet Commonly known as:  GLUCOPHAGE XR Take 2 Tabs by mouth daily (with dinner). nystatin-triamcinolone topical cream  
Commonly known as:  MYCOLOG II Apply  to affected area two (2) times a day. Introducing [facility]! Dear Javier Forman: Thank you for requesting a Lyft account. Our records indicate that you have previously registered for a Lyft account but its currently inactive. Please call our Lyft support line at [phone]. Additional Information If you have questions, please visit the Frequently Asked Questions section of the Lyft website at [email or web address] Remember, Lyft is NOT to be used for urgent needs. For medical emergencies, dial 911. Now available from your iPhone and Android! Please provide this summary of care documentation to your next provider. Your primary care clinician is listed as Elena Flores. If you have any questions after today's visit, please call [phone].

## 2017-11-10 NOTE — PROGRESS NOTES
55 y.o. BLACK OR  female who presents for evaluation.  is here for the eval    Denies any cardiovascular complaints. They started back an exercise program the last week or so    Denies polyuria, polydipsia, nocturia, vision change. Not checking sugars at this time. Vitals 10/23/2017 2016 2016 2016 2016   Weight 221 lb 212 lb 3.2 oz 209 lb 212 lb 209 lb     No cardiovascular complaints. The bp has been better controlled since the addition of norvasc    No gi or gu sfx. She is still menstruating but not to the extent she was prior to her ablation.   She had full gi w/u Dr Aleksandr Banuelos in , has not noted any gi sx    Past Medical History:   Diagnosis Date    Acanthosis nigricans     Anxiety     Asthma     Depression     Diabetes mellitus ([facility] [address]     Heavy menses 3/12    s/p endometrial ablation 3/16    History of CVA (cerebrovascular accident)     no deficits    History of iron deficiency anemia     Hyperlipidemia     Hypertension     Hypovitaminosis D 10/11    Morbid obesity (HCC)     peak weight 215 lbs, bmi 38.1 from 6/15; delcined dharmesh suppressants, med suprervised wt loss, bariatric options    JOLIE (obstructive sleep apnea)     Dr. Rodriguez Grafton cpap    Perennial allergic rhinitis      Past Surgical History:   Procedure Laterality Date    HX  SECTION      x2    HX COLONOSCOPY      Dr Aleksandr Banuelos 6/10 negative    HX GI  6/10    neg EGD Dr Ildefonso Hogue HX GYN      cyst removal    HX GYN      LEEP    HX HYSTEROSCOPY WITH ENDOMETRIAL ABLATION  3/16    Dr Nathalie Kaur     Social History     Social History    Marital status:      Spouse name: N/A    Number of children: 2    Years of education: N/A     Occupational History    works at [employer] History Main Topics    Smoking status: Never Smoker    Smokeless tobacco: Never Used    Alcohol use Yes      Comment: social drinker    Drug use: No    Sexual activity: Not on file     Other Topics Concern    Not on file     Social History Narrative     Current Outpatient Prescriptions   Medication Sig    atenolol (TENORMIN) 50 mg tablet Take 100 mg by mouth daily.  pioglitazone (ACTOS) 15 mg tablet 1 daily    ergocalciferol (ERGOCALCIFEROL) 50,000 unit capsule Take 1 Cap by mouth every seven (7) days.  amLODIPine (NORVASC) 10 mg tablet Take 1 Tab by mouth daily.  fluticasone-vilanterol (BREO ELLIPTA) 200-25 mcg/dose inhaler Take 1 Puff by inhalation daily.  metFORMIN ER (GLUCOPHAGE XR) 500 mg tablet Take 2 Tabs by mouth daily (with dinner).  cyanocobalamin 1,000 mcg tablet Take 1,000 mcg by mouth daily.  magnesium 250 mg tab Take 250 mg by mouth daily.  chlorthalidone (HYGROTEN) 25 mg tablet TAKE ONE TABLET BY MOUTH ONCE DAILY    glipiZIDE (GLUCOTROL) 10 mg tablet TAKE ONE TABLET BY MOUTH TWICE DAILY    BYDUREON 2 mg serr INJECT THE CONTENTS OF ONE SYRINGE SUBCUTANEOUSLY ONCE EVERY 7 DAYS    nystatin-triamcinolone (MYCOLOG II) topical cream Apply  to affected area two (2) times a day.  citalopram (CELEXA) 20 mg tablet TAKE ONE TABLET BY MOUTH ONCE DAILY    atorvastatin (LIPITOR) 20 mg tablet TAKE ONE TABLET BY MOUTH ONCE DAILY    losartan (COZAAR) 100 mg tablet TAKE ONE TABLET BY MOUTH ONCE DAILY    aspirin 81 mg chewable tablet Take 81 mg by mouth daily.  KLOR-CON M20 20 mEq tablet TAKE ONE TABLET BY MOUTH ONCE DAILY    Blood-Glucose Meter monitoring kit Pt checks blood sugar once daily, Dx E11.9    Lancets misc Pt checks blood sugar once daily, Dx E11.9    glucose blood VI test strips (BLOOD GLUCOSE TEST) strip Pt checks blood sugar once daily, Dx e11.9    cloNIDine HCl (CATAPRES) 0.1 mg tablet Take 1 Tab by mouth two (2) times a day.  albuterol (PROVENTIL HFA, VENTOLIN HFA, PROAIR HFA) 90 mcg/actuation inhaler Take 2 Puffs by inhalation every six (6) hours as needed for Wheezing. No current facility-administered medications for this visit. Allergies   Allergen Reactions    Penicillins Unknown (comments)     Tongue swelling; difficulty swallowing; thrush    Compazine [Prochlorperazine] Unknown (comments)     Paranoia    Invokana [Canagliflozin] Other (comments)     Yeast infections     REVIEW OF SYSTEMS: sees Dr Daljit Crandall, saw Jilye Lechuga 1/14, mammo 2/13  Ophtho - no vision change or eye pain  Oral - no mouth pain, tongue or tooth problems  Ears - no hearing loss, ear pain, fullness, no swallowing problems  Cardiac - no CP, PND, orthopnea, edema, palpitations or syncope  Chest - no breast masses  Resp - no wheezing, chronic coughing, dyspnea  GI - no heartburn, nausea, vomiting, change in bowel habits, bleeding, hemorrhoids  Urinary - no dysuria, hematuria, flank pain, urgency, frequency  Genitals - no genital lesions, discharge, masses, ulceration, warts  Ortho - no swelling, dec ROM, myalgias  Psych - denies any anxiety or depression symptoms, no hallucinations or violent ideation  Constitutional - no wt loss, night sweats, unexplained fevers  Neuro - no focal weakness, numbness, paresthesias, incoordination, ataxia, involuntary movements  Endo - no polyuria, polydipsia, nocturia, hot flashes    Visit Vitals    /84 (BP 1 Location: Right arm, BP Patient Position: Sitting)    Pulse 71    Temp 98.2 °F (36.8 °C) (Oral)    Resp 14    Ht 5' 4\" (1.626 m)    Wt 217 lb (98.4 kg)    SpO2 99%    BMI 37.25 kg/m2     A&O x3  Affect is appropriate. Mood stable  No apparent distress  Anicteric, no JVD, adenopathy or thyromegaly. No carotid bruits or radiated murmur  Lungs clear to auscultation, no wheezes or rales  Heart showed regular rate and rhythm. No murmur, rubs, gallops  Abdomen soft nontender, no hepatosplenomegaly or masses. Extremities without edema.     Foot exam bilaterally showed  Reflexes 2+   Vibration, proprioception, filament test intact  Pulses 1-2+ DP and PT    LABS  From 10/11 showed                   hba1c 11.1, ldl-p 2182, chol 166, tg 139, hdl 39, ldl-c 99,                 vit d 16.5  From 12/11 showed gluc 111, cr 0.68,              alt10,                      ldl-p 1980, chol 178, tg 104, hdl 40, ldl-c 117  From 3/12 showed                   hba1c 6.4,                  wbc 4.4, hb 11.0, plt 306,            vit d 41.6, %sat 10, ferritin 20, spep neg, fol 12.5, b12 665  From 11/12 showed gluc 196, cr 0.50,              alt 27, hba1c 9.1,   ldl-p 1745, chol 154, tg 146, hdl 41, ldl-c 84  From 7/13 showed   gluc 157, cr 0.50, gfr>60, alt 18, hba1c 8.8,   ldl-p 1017, chol 119, tg 102, hdl 39, ldl-c 60  From 8/14 showed   gluc 137, cr 0.50, gfr>60, alt 15, hba1c 7.3,       chol 118, tg 92,   hdl 34, ldl-c 66,  wbc 3.1, hb 8.9,   plt 236, umar 17.6  From 12/15 showed gluc 124, cr 0.60, gfr>60, alt 23, hba1c 6.7,       chol 120, tg 161, hdl 34, ldl-c 54,  wbc 4.5, hb 10.2, plt 274,           vit d 13.9, tsh 2.12  From 5/16 showed   gluc 121, cr 0.70, gfr>60, alt 27  From 7/16 showed        hba1c 7.4,       chol 134, tg 127, hdl 40, ldl-c 69  From 11/17 showed gluc 260, cr 1.06, gfr>60, alt 32, hba1c 9.1,       chol 143, tg 180, hdl 33, ldl-c 74,  wbc 3.5, hb 10.4, plt 245, umar 72.5, vit d 12.3    Patient Active Problem List   Diagnosis Code    Asthma J45.909    Depression and anxiety F32.9    JOLIE and PLMD Dr. Todd Finney 2005 G47.33    Hypovitaminosis D E55.9    Dyslipidemia E78.5    Primary hypertension I10    Obesity (BMI 30-39. 9) E66.9    Uncontrolled type 2 diabetes mellitus with microalbuminuria E11.29, E11.65, R80.9     Assessment and plan:  1. Diabetes. Check labs, tighten up diet, inc exercise, try and lose weight. F/U ophth. Long discussion about adding insulin vs actos. She wanted to give non insulin regimen 1 more try so will have her come back in 3 mos and reevaluate on actos 15 plus current meds  2. Hyperlipidemia. Continue current regimen. 3. Hypovitaminosis D. Restart supplementation  4. Sleep apnea.  F/u  Olinda Better  5. Morbid obesity. Not interested in medically supervised wt loss or dharmesh suppressants. 6. Hypertension. Continue current   7. Asthma. Continue current regimen. 8. Anemia. Will send to Dr Kathryn May for consideration of fe infusion  9. F/U gyn, mammo ordered  10. PVX declined         RTC 2/18    Above conditions discussed at length and patient vocalized understanding.   All questions answered to patient satifaction    TOTAL time 40 min spent of which at least 30 min was on counseling, answering questions and coordination of care

## 2017-12-03 RX ORDER — CITALOPRAM 20 MG/1
TABLET, FILM COATED ORAL
Qty: 90 TAB | Refills: 3 | Status: SHIPPED | OUTPATIENT
Start: 2017-12-03 | End: 2018-12-14 | Stop reason: SDUPTHER

## 2017-12-12 RX ORDER — ATORVASTATIN CALCIUM 20 MG/1
TABLET, FILM COATED ORAL
Qty: 90 TAB | Refills: 3 | Status: SHIPPED | OUTPATIENT
Start: 2017-12-12 | End: 2019-01-15 | Stop reason: SDUPTHER

## 2018-01-16 RX ORDER — LOSARTAN POTASSIUM 100 MG/1
TABLET ORAL
Qty: 90 TAB | Refills: 3 | Status: SHIPPED | OUTPATIENT
Start: 2018-01-16 | End: 2019-02-13 | Stop reason: SDUPTHER

## 2018-01-24 RX ORDER — CHLORTHALIDONE 25 MG/1
TABLET ORAL
Qty: 90 TAB | Refills: 0 | Status: SHIPPED | OUTPATIENT
Start: 2018-01-24 | End: 2018-03-23 | Stop reason: ALTCHOICE

## 2018-02-14 RX ORDER — AMLODIPINE BESYLATE 10 MG/1
TABLET ORAL
Qty: 90 TAB | Refills: 0 | Status: SHIPPED | OUTPATIENT
Start: 2018-02-14 | End: 2018-05-22 | Stop reason: SDUPTHER

## 2018-03-01 ENCOUNTER — TELEPHONE (OUTPATIENT)
Dept: INTERNAL MEDICINE CLINIC | Age: 48
End: 2018-03-01

## 2018-03-01 NOTE — TELEPHONE ENCOUNTER
Pt called and said she talked with Dr Leela Lugo office, whom Dr Gisella Garcia referred pt to back in November 2017. They told her they didn't have any records on her. So I explained with we always send last office note & labs with referral paperwork. But, we can send it again since she has appt scheduled for 4/18/18.

## 2018-03-17 ENCOUNTER — HOSPITAL ENCOUNTER (OUTPATIENT)
Dept: LAB | Age: 48
Discharge: HOME | End: 2018-03-17

## 2018-03-17 LAB — SENTARA SPECIMEN COL,SENBCF: NORMAL

## 2018-03-17 PROCEDURE — 99001 SPECIMEN HANDLING PT-LAB: CPT | Performed by: INTERNAL MEDICINE

## 2018-03-18 LAB
25(OH)D3 SERPL-MCNC: 19.2 NG/ML (ref 32–100)
A-G RATIO,AGRAT: 1.3 RATIO (ref 1.1–2.6)
ALBUMIN SERPL-MCNC: 4.4 G/DL (ref 3.5–5)
ALP SERPL-CCNC: 75 U/L (ref 25–115)
ALT SERPL-CCNC: 29 U/L (ref 5–40)
ANION GAP SERPL CALC-SCNC: 14 MMOL/L
AST SERPL W P-5'-P-CCNC: 25 U/L (ref 10–37)
AVG GLU, 10930: 208 MG/DL (ref 91–123)
BILIRUB SERPL-MCNC: 0.3 MG/DL (ref 0.2–1.2)
BUN SERPL-MCNC: 15 MG/DL (ref 6–22)
CALCIUM SERPL-MCNC: 9.5 MG/DL (ref 8.4–10.5)
CHLORIDE SERPL-SCNC: 96 MMOL/L (ref 98–110)
CHOLEST SERPL-MCNC: 163 MG/DL (ref 110–200)
CO2 SERPL-SCNC: 28 MMOL/L (ref 20–32)
CREAT SERPL-MCNC: 0.6 MG/DL (ref 0.5–1.2)
CREATININE, URINE: 112 MG/DL
ERYTHROCYTE [DISTWIDTH] IN BLOOD BY AUTOMATED COUNT: 15.6 % (ref 10–15.5)
GFRAA, 66117: >60
GFRNA, 66118: >60
GLOBULIN,GLOB: 3.5 G/DL (ref 2–4)
GLUCOSE SERPL-MCNC: 182 MG/DL (ref 70–99)
HBA1C MFR BLD HPLC: 8.9 % (ref 4.8–5.9)
HCT VFR BLD AUTO: 33.1 % (ref 35.1–48)
HDLC SERPL-MCNC: 4 MG/DL (ref 0–5)
HDLC SERPL-MCNC: 41 MG/DL (ref 40–59)
HGB BLD-MCNC: 10.5 G/DL (ref 11.7–16)
LDLC SERPL CALC-MCNC: 101 MG/DL (ref 50–99)
MCH RBC QN AUTO: 23 PG (ref 26–34)
MCHC RBC AUTO-ENTMCNC: 32 G/DL (ref 31–36)
MCV RBC AUTO: 71 FL (ref 80–95)
MICROALB/CREAT RATIO, 140286: 62.7 MCG/MG OF CREATININE (ref 0–30)
MICROALBUMIN,URINE RANDOM 140054: 70.2 UG/ML (ref 0.1–17)
PLATELET # BLD AUTO: 276 K/UL (ref 140–440)
PMV BLD AUTO: 11.4 FL (ref 9–13)
POTASSIUM SERPL-SCNC: 3 MMOL/L (ref 3.5–5.5)
PROT SERPL-MCNC: 7.9 G/DL (ref 6.4–8.3)
RBC # BLD AUTO: 4.66 M/UL (ref 3.8–5.2)
SODIUM SERPL-SCNC: 138 MMOL/L (ref 133–145)
TRIGL SERPL-MCNC: 105 MG/DL (ref 40–149)
VLDLC SERPL CALC-MCNC: 21 MG/DL (ref 8–30)
WBC # BLD AUTO: 3.6 K/UL (ref 4–11)

## 2018-03-22 ENCOUNTER — TELEPHONE (OUTPATIENT)
Dept: INTERNAL MEDICINE CLINIC | Age: 48
End: 2018-03-22

## 2018-03-22 NOTE — PROGRESS NOTES
52 y.o. BLACK OR  female who presents for evaluation. Denies any cardiovascular complaints. Still not exercising but she's ramping up her activities as much as she can. She also joine da dance class    Denies polyuria, polydipsia, nocturia, vision change. Not checking sugars at this time. Admits the diet could be better, weight is up. No hypoglycemia episodes    Vitals 3/23/2018 11/10/2017 10/25/2017 10/23/2017 2016   Weight 226 lb 217 lb 221 lb 221 lb 212 lb 3.2 oz     No cardiovascular complaints. The bp has been controlled    No gi or gu sfx. She is still menstruating but not to the extent she was prior to her ablation.   She is supposed to see Dr Davi Jones in mid April for consideration for iron infusion    We discussed her mealtime habits, eats first big meal at 2-3pm and dinner between 7-9pm but she eats small snacks in the morning and late evening    IF 3/18    Past Medical History:   Diagnosis Date    Acanthosis nigricans     Anxiety     Asthma     Depression     Diabetes mellitus ([address]     Heavy menses 3/12    s/p endometrial ablation 3/16    History of CVA (cerebrovascular accident)     no deficits    History of iron deficiency anemia     Hyperlipidemia     Hypertension     Hypovitaminosis D 10/11    Morbid obesity (HCC)     peak weight 215 lbs, bmi 38.1 from 6/15; delcined dharmesh suppressants, med suprervised wt loss, bariatrics; IF 3/18    JOLIE (obstructive sleep apnea)     Dr. Alonso Bailon cpap    Perennial allergic rhinitis      Past Surgical History:   Procedure Laterality Date    HX  SECTION      x2    HX COLONOSCOPY      Dr Amaya Taylor 6/10 negative    HX GI  6/10    neg EGD Dr Go Burnham HX GYN      cyst removal    HX GYN      LEEP    HX HYSTEROSCOPY WITH ENDOMETRIAL ABLATION  3/16    Dr Esteban Arellano     Social History     Social History    Marital status:      Spouse name: N/A    Number of children: 2    Years of education: N/A Occupational History    works at [employer] History Main Topics    Smoking status: Never Smoker    Smokeless tobacco: Never Used    Alcohol use Yes      Comment: social drinker    Drug use: No    Sexual activity: Not on file     Other Topics Concern    Not on file     Social History Narrative     Current Outpatient Prescriptions   Medication Sig    triamterene-hydroCHLOROthiazide (DYAZIDE) 37.5-25 mg per capsule Take 1 Cap by mouth daily.  repaglinide (PRANDIN) 2 mg tablet Take 1 Tab by mouth Before breakfast, lunch, and dinner.  amLODIPine (NORVASC) 10 mg tablet TAKE ONE TABLET BY MOUTH ONCE DAILY. *CHANGE IN STRENGTH*.  losartan (COZAAR) 100 mg tablet TAKE ONE TABLET BY MOUTH ONCE DAILY    atorvastatin (LIPITOR) 20 mg tablet TAKE ONE TABLET BY MOUTH ONCE DAILY    citalopram (CELEXA) 20 mg tablet TAKE ONE TABLET BY MOUTH ONCE DAILY    pioglitazone (ACTOS) 15 mg tablet 1 daily    ergocalciferol (ERGOCALCIFEROL) 50,000 unit capsule Take 1 Cap by mouth every seven (7) days.  metFORMIN ER (GLUCOPHAGE XR) 500 mg tablet Take 2 Tabs by mouth daily (with dinner).  albuterol (PROVENTIL HFA, VENTOLIN HFA, PROAIR HFA) 90 mcg/actuation inhaler Take 2 Puffs by inhalation every six (6) hours as needed for Wheezing.  BYDUREON 2 mg serr INJECT THE CONTENTS OF ONE SYRINGE SUBCUTANEOUSLY ONCE EVERY 7 DAYS    nystatin-triamcinolone (MYCOLOG II) topical cream Apply  to affected area two (2) times a day.  aspirin 81 mg chewable tablet Take 81 mg by mouth daily.  KLOR-CON M20 20 mEq tablet TAKE ONE TABLET BY MOUTH ONCE DAILY    atenolol (TENORMIN) 50 mg tablet Take 100 mg by mouth daily.     Blood-Glucose Meter monitoring kit Pt checks blood sugar once daily, Dx E11.9    Lancets misc Pt checks blood sugar once daily, Dx E11.9    glucose blood VI test strips (BLOOD GLUCOSE TEST) strip Pt checks blood sugar once daily, Dx e11.9    cloNIDine HCl (CATAPRES) 0.1 mg tablet Take 1 Tab by mouth two (2) times a day.  fluticasone-vilanterol (BREO ELLIPTA) 200-25 mcg/dose inhaler Take 1 Puff by inhalation daily.  cyanocobalamin 1,000 mcg tablet Take 1,000 mcg by mouth daily.  magnesium 250 mg tab Take 250 mg by mouth daily. No current facility-administered medications for this visit. Allergies   Allergen Reactions    Penicillins Unknown (comments)     Tongue swelling; difficulty swallowing; thrush    Compazine [Prochlorperazine] Unknown (comments)     Paranoia    Invokana [Canagliflozin] Other (comments)     Yeast infections     REVIEW OF SYSTEMS: Dr Adonis Orellana 2017, mammo 2/13, sees [facility], colo 2010  Ophtho  no vision change or eye pain  Oral  no mouth pain, tongue or tooth problems  Ears  no hearing loss, ear pain, fullness, no swallowing problems  Cardiac  no CP, PND, orthopnea, edema, palpitations or syncope  Chest  no breast masses  Resp  no wheezing, chronic coughing, dyspnea  GI  no heartburn, nausea, vomiting, change in bowel habits, bleeding, hemorrhoids  Urinary  no dysuria, hematuria, flank pain, urgency, frequency  Genitals  no genital lesions, discharge, masses, ulceration, warts  Ortho  no swelling, dec ROM, myalgias  Psych  denies any anxiety or depression symptoms, no hallucinations or violent ideation  Constitutional  no wt loss, night sweats, unexplained fevers  Neuro  no focal weakness, numbness, paresthesias, incoordination, ataxia, involuntary movements  Endo - no polyuria, polydipsia, nocturia, hot flashes    Visit Vitals    /78 (BP 1 Location: Right arm, BP Patient Position: Sitting)    Pulse 94    Temp 99 °F (37.2 °C) (Oral)    Resp 14    Ht 5' 4\" (1.626 m)    Wt 226 lb (102.5 kg)    SpO2 98%    BMI 38.79 kg/m2     A&O x3  Affect is appropriate. Mood stable  No apparent distress  Anicteric, no JVD, adenopathy or thyromegaly.    No carotid bruits or radiated murmur  Lungs clear to auscultation, no wheezes or rales  Heart showed regular rate and rhythm. No murmur, rubs, gallops  Abdomen soft nontender, no hepatosplenomegaly or masses. Extremities without edema. Pulses 2+    LABS  From 10/11 showed                   hba1c 11.1, ldl-p 2182, chol 166, tg 139, hdl 39, ldl-c 99,                 vit d 16.5  From 12/11 showed gluc 111, cr 0.68,              alt10,                      ldl-p 1980, chol 178, tg 104, hdl 40, ldl-c 117  From 3/12 showed                   hba1c 6.4,                  wbc 4.4, hb 11.0, plt 306,            vit d 41.6, %sat 10, ferritin 20, spep neg, fol 12.5, b12 665  From 11/12 showed gluc 196, cr 0.50,              alt 27, hba1c 9.1,   ldl-p 1745, chol 154, tg 146, hdl 41, ldl-c 84  From 7/13 showed   gluc 157, cr 0.50, gfr>60, alt 18, hba1c 8.8,   ldl-p 1017, chol 119, tg 102, hdl 39, ldl-c 60  From 8/14 showed   gluc 137, cr 0.50, gfr>60, alt 15, hba1c 7.3,       chol 118, tg 92,   hdl 34, ldl-c 66,  wbc 3.1, hb 8.9,   plt 236, umar 17.6  From 12/15 showed gluc 124, cr 0.60, gfr>60, alt 23, hba1c 6.7,       chol 120, tg 161, hdl 34, ldl-c 54,  wbc 4.5, hb 10.2, plt 274,           vit d 13.9, tsh 2.12  From 5/16 showed   gluc 121, cr 0.70, gfr>60, alt 27  From 7/16 showed        hba1c 7.4,       chol 134, tg 127, hdl 40, ldl-c 69  From 11/17 showed gluc 260, cr 1.06, gfr>60, alt 32, hba1c 9.1,       chol 143, tg 180, hdl 33, ldl-c 74,  wbc 3.5, hb 10.4, plt 245, umar 72.5, vit d 12.3  Form 3/18 showed   gluc 182, cr 0.60, gfr>60,    hba1c 8.9,       chol 163, tg 105, hdl 41, ldl-c 101, wbc 3.6, hb 10.5, plt 276, camacho 70.2, vit d 19.2    Patient Active Problem List   Diagnosis Code    Asthma J45.909    JOLIE and PLMD Dr. Yokasta Bernstein 2005 G47.33    Hypovitaminosis D E55.9    Dyslipidemia E78.5    Primary hypertension I10    Uncontrolled type 2 diabetes mellitus with microalbuminuria E11.29, E11.65, R80.9    Severe obesity (BMI 35.0-39. 9) with comorbidity (HCC) E66.01    Mild episode of recurrent major depressive disorder ([facility] F33.0    Anxiety F41.9     Assessment and plan:  1. Diabetes. Change to prandin due to paln below, continue all else  2. Hyperlipidemia. Continue current regimen. 3. Hypovitaminosis D. Supplementation  4. Sleep apnea. F/u Dr. Pascale Barber  5. Morbid obesity. Not interested in medically supervised wt loss or dharmesh suppressants. Intermittent fasting discussed at length. Explained the concepts in detail. Went over possible physiologic changes that could occur and how that would possibly impact her situation in a positive way. Discussed 16:8 program in particular. We also went over the differences between hunger and baylee hypoglycemia and he will need to be watchful with the diabetes. Will change glucotrol to prandin for tighter dosing. She will do some more research and consider implementing in the near future  6. Hypertension. Continue current   7. Asthma. Continue current regimen. 8. Anemia. Pending eval with Dr Gelacio Arnold for consideration of fe infusion  9. F/U gyn, mammo previously ordered        RTC 7/18    Above conditions discussed at length and patient vocalized understanding.   All questions answered to patient satisfaction    TOTAL time 40 min spent of which at least 30 min was on counseling, answering questions and coordination of care

## 2018-03-23 ENCOUNTER — OFFICE VISIT (OUTPATIENT)
Dept: INTERNAL MEDICINE CLINIC | Age: 48
End: 2018-03-23

## 2018-03-23 VITALS
SYSTOLIC BLOOD PRESSURE: 118 MMHG | OXYGEN SATURATION: 98 % | HEART RATE: 94 BPM | BODY MASS INDEX: 38.58 KG/M2 | RESPIRATION RATE: 14 BRPM | DIASTOLIC BLOOD PRESSURE: 78 MMHG | TEMPERATURE: 99 F | HEIGHT: 64 IN | WEIGHT: 226 LBS

## 2018-03-23 DIAGNOSIS — J45.20 MILD INTERMITTENT ASTHMA WITHOUT COMPLICATION: ICD-10-CM

## 2018-03-23 DIAGNOSIS — E55.9 HYPOVITAMINOSIS D: ICD-10-CM

## 2018-03-23 DIAGNOSIS — I10 PRIMARY HYPERTENSION: ICD-10-CM

## 2018-03-23 DIAGNOSIS — F41.9 ANXIETY: ICD-10-CM

## 2018-03-23 DIAGNOSIS — G47.33 OSA (OBSTRUCTIVE SLEEP APNEA): ICD-10-CM

## 2018-03-23 DIAGNOSIS — E87.6 HYPOKALEMIA: ICD-10-CM

## 2018-03-23 DIAGNOSIS — E78.5 DYSLIPIDEMIA: ICD-10-CM

## 2018-03-23 DIAGNOSIS — E66.01 SEVERE OBESITY (BMI 35.0-39.9) WITH COMORBIDITY (HCC): ICD-10-CM

## 2018-03-23 DIAGNOSIS — F33.0 MILD EPISODE OF RECURRENT MAJOR DEPRESSIVE DISORDER (HCC): ICD-10-CM

## 2018-03-23 RX ORDER — TRIAMTERENE AND HYDROCHLOROTHIAZIDE 37.5; 25 MG/1; MG/1
1 CAPSULE ORAL DAILY
Qty: 90 CAP | Refills: 3 | Status: SHIPPED | OUTPATIENT
Start: 2018-03-23 | End: 2018-11-06 | Stop reason: ALTCHOICE

## 2018-03-23 RX ORDER — REPAGLINIDE 2 MG/1
2 TABLET ORAL
Qty: 90 TAB | Refills: 11 | Status: SHIPPED | OUTPATIENT
Start: 2018-03-23 | End: 2019-03-24 | Stop reason: SDUPTHER

## 2018-03-23 NOTE — MR AVS SNAPSHOT
[address] [facility] 
 
 
 [address], [address] [facility] 
[phone] Patient: Gaylord Crigler MRN: [id number] GCK:[id number] Visit Information Date & Time Provider Department Dept. Phone Encounter #  
 3/23/2018  2:45 PM Ana Rosa Mccord MD [facility] [address] [place] [address] [phone] Your Appointments 8/7/2018 11:00 AM  
Office Visit with Ana Rosa Mccord MD  
[facility] [address] [place] [address] [facility] Appt Note: ov per rd  
 [address] [id number] [address]  
  
   
 [address], [address] Upcoming Health Maintenance Date Due Pneumococcal 19-64 Medium Risk (1 of 1 - PPSV23) 12/15/1989 DTaP/Tdap/Td series (1 - Tdap) 12/15/1991 EYE EXAM RETINAL OR DILATED Q1 9/18/2015 HEMOGLOBIN A1C Q6M 9/17/2018 FOOT EXAM Q1 11/10/2018 PAP AKA CERVICAL CYTOLOGY 2/24/2019 MICROALBUMIN Q1 3/17/2019 LIPID PANEL Q1 3/17/2019 COLONOSCOPY 6/1/2020 Allergies as of 3/23/2018  Review Complete On: 11/10/2017 By: Ana Rosa Mccord MD  
  
 Severity Noted Reaction Type Reactions Penicillins High 10/03/2011    Unknown (comments) Tongue swelling; difficulty swallowing; thrush Compazine [Prochlorperazine]  10/03/2011    Unknown (comments) Paranoia Invokana [Canagliflozin]  06/20/2016    Other (comments) Yeast infections Current Immunizations  Reviewed on 3/21/2018 Name Date Influenza Vaccine 10/1/2017, 10/1/2016, 10/1/2015 Reviewed by Ana Rosa Mccord MD on 3/21/2018 at 10:57 PM  
Vitals BP Pulse Temp Resp Height(growth percentile) Weight(growth percentile) 118/78 (BP 1 Location: Right arm, BP Patient Position: Sitting) 94 99 °F (37.2 °C) (Oral) 14 5' 4\" (1.626 m) 226 lb (102.5 kg) SpO2 BMI OB Status Smoking Status 98% 38.79 kg/m2 Having regular periods Never Smoker Vitals History BMI and BSA Data Body Mass Index Body Surface Area 38.79 kg/m 2 2.15 m 2 Preferred Pharmacy Pharmacy Name Phone [address] [place] [address] [phone] Your Updated Medication List  
  
   
This list is accurate as of 3/23/18  3:57 PM.  Always use your most recent med list.  
  
  
  
  
 albuterol 90 mcg/actuation inhaler Commonly known as:  PROVENTIL HFA, VENTOLIN HFA, PROAIR HFA Take 2 Puffs by inhalation every six (6) hours as needed for Wheezing. amLODIPine 10 mg tablet Commonly known as:  Dom Nearing TAKE ONE TABLET BY MOUTH ONCE DAILY. *CHANGE IN STRENGTH*. aspirin 81 mg chewable tablet Take 81 mg by mouth daily. atenolol 50 mg tablet Commonly known as:  TENORMIN Take 100 mg by mouth daily. atorvastatin 20 mg tablet Commonly known as:  LIPITOR  
TAKE ONE TABLET BY MOUTH ONCE DAILY Blood-Glucose Meter monitoring kit Pt checks blood sugar once daily, Dx E11.9 BYDUREON 2 mg Serr Generic drug:  exenatide microspheres INJECT THE CONTENTS OF ONE SYRINGE SUBCUTANEOUSLY ONCE EVERY 7 DAYS  
  
 chlorthalidone 25 mg tablet Commonly known as:  HYGROTEN  
TAKE ONE TABLET BY MOUTH ONCE DAILY  
  
 citalopram 20 mg tablet Commonly known as:  CELEXA  
TAKE ONE TABLET BY MOUTH ONCE DAILY  
  
 cloNIDine HCl 0.1 mg tablet Commonly known as:  CATAPRES Take 1 Tab by mouth two (2) times a day. cyanocobalamin 1,000 mcg tablet Take 1,000 mcg by mouth daily. ergocalciferol 50,000 unit capsule Commonly known as:  ERGOCALCIFEROL Take 1 Cap by mouth every seven (7) days. fluticasone-vilanterol 200-25 mcg/dose inhaler Commonly known as:  BREO ELLIPTA Take 1 Puff by inhalation daily. glipiZIDE 10 mg tablet Commonly known as:  GLUCOTROL  
TAKE ONE TABLET BY MOUTH TWICE DAILY  
  
 glucose blood VI test strips strip Commonly known as:  blood glucose test  
 Pt checks blood sugar once daily, Dx e11.9 KLOR-CON M20 20 mEq tablet Generic drug:  potassium chloride TAKE ONE TABLET BY MOUTH ONCE DAILY Lancets Misc Pt checks blood sugar once daily, Dx E11.9  
  
 losartan 100 mg tablet Commonly known as:  COZAAR  
TAKE ONE TABLET BY MOUTH ONCE DAILY  
  
 magnesium 250 mg Tab Take 250 mg by mouth daily. metFORMIN  mg tablet Commonly known as:  GLUCOPHAGE XR Take 2 Tabs by mouth daily (with dinner). nystatin-triamcinolone topical cream  
Commonly known as:  MYCOLOG II Apply  to affected area two (2) times a day. pioglitazone 15 mg tablet Commonly known as:  ACTOS  
1 daily Introducing [address]! Dear Paola Hendricks: Thank you for requesting a [employer] account. Our records indicate that you have previously registered for a [employer] account but its currently inactive. Please call our [employer] support line at [phone]. Additional Information If you have questions, please visit the Frequently Asked Questions section of the [employer] website at [email or web address] Remember, [employer] is NOT to be used for urgent needs. For medical emergencies, dial 911. Now available from your iPhone and Android! Please provide this summary of care documentation to your next provider. Your primary care clinician is listed as William Greco. If you have any questions after today's visit, please call [phone].

## 2018-03-23 NOTE — PROGRESS NOTES
1. Have you been to the ER, urgent care clinic or hospitalized since your last visit? NO.     2. Have you seen or consulted any other health care providers outside of the [address] [place] [address] since your last visit (Include any pap smears or colon screening)? NO      Do you have an Advanced Directive? NO    Would you like information on Advanced Directives?  NO

## 2018-03-26 ENCOUNTER — TELEPHONE (OUTPATIENT)
Dept: INTERNAL MEDICINE CLINIC | Age: 48
End: 2018-03-26

## 2018-03-26 NOTE — TELEPHONE ENCOUNTER
----- Message from Antonio Langford MD sent at 3/23/2018 10:49 PM EDT -----  Records royeraftermarlen siegel pls

## 2018-04-02 ENCOUNTER — TELEPHONE (OUTPATIENT)
Dept: INTERNAL MEDICINE CLINIC | Age: 48
End: 2018-04-02

## 2018-04-02 NOTE — TELEPHONE ENCOUNTER
[facility] calling, says Actos 15 mg is on back order. Is it ok to fill 30 mg and change rx to take 1/2 tab until the 15 mg comes back into stock?

## 2018-04-02 NOTE — TELEPHONE ENCOUNTER
[address] aware of message below and verbalized understanding. No further questions at this time.

## 2018-04-16 ENCOUNTER — TELEPHONE (OUTPATIENT)
Dept: INTERNAL MEDICINE CLINIC | Age: 48
End: 2018-04-16

## 2018-04-16 NOTE — TELEPHONE ENCOUNTER
Pt is on a fasting diet and eats at 3 pm, she is taking RX Prandini that states to take 3xs a day before breakfast, lunch and dinner. Pt is unsure on what to do, maybe just 2xs a day?     Also she has a on going cough, not sure if its allergies is asking for a script    please advise [phone]

## 2018-04-16 NOTE — TELEPHONE ENCOUNTER
prandin is supposed to be taken with MEALS only  So, if she has 2 meals a day then it should only be taken 2x/day    Try otc allegra/zyrtec  If no better, we'll call in flonase

## 2018-04-20 RX ORDER — METFORMIN HYDROCHLORIDE 500 MG/1
TABLET, EXTENDED RELEASE ORAL
Qty: 180 TAB | Refills: 0 | Status: SHIPPED | OUTPATIENT
Start: 2018-04-20 | End: 2018-10-17 | Stop reason: SDUPTHER

## 2018-05-02 RX ORDER — CHLORTHALIDONE 25 MG/1
TABLET ORAL
Qty: 90 TAB | Refills: 0 | OUTPATIENT
Start: 2018-05-02

## 2018-05-03 RX ORDER — CHLORTHALIDONE 25 MG/1
25 TABLET ORAL DAILY
Qty: 90 TAB | Refills: 1 | Status: SHIPPED | OUTPATIENT
Start: 2018-05-03 | End: 2018-11-05 | Stop reason: SDUPTHER

## 2018-05-03 NOTE — TELEPHONE ENCOUNTER
Last Visit: 03/23/2018 with MD Mckenzie Johnson    Next Appointment: 08/07/2018 with MD Mckenzie Johnson   Previous Refill Encounters: 01/24/2018 per MD Donya Anderson #[id number]    Requested Prescriptions     Pending Prescriptions Disp Refills    chlorthalidone (HYGROTEN) 25 mg tablet 90 Tab 1     Sig: Take 1 Tab by mouth daily.      Refused Prescriptions Disp Refills    chlorthalidone (HYGROTEN) 25 mg tablet [Pharmacy Med Name: CHLORTHALIDONE 25MG    TAB] 90 Tab 0     Sig: TAKE ONE TABLET BY MOUTH ONCE DAILY     Refused By: Monico Mills     Reason for Refusal: other

## 2018-05-23 RX ORDER — AMLODIPINE BESYLATE 10 MG/1
TABLET ORAL
Qty: 90 TAB | Refills: 0 | Status: SHIPPED | OUTPATIENT
Start: 2018-05-23 | End: 2021-01-05 | Stop reason: DRUGHIGH

## 2018-06-07 ENCOUNTER — TELEPHONE (OUTPATIENT)
Dept: INTERNAL MEDICINE CLINIC | Age: 48
End: 2018-06-07

## 2018-06-07 NOTE — TELEPHONE ENCOUNTER
Pt called and stated tht she is taking prandin 2mgs tabs, she said since she has been taking it she has been having leg pain, adv her RD out of the office

## 2018-06-08 NOTE — TELEPHONE ENCOUNTER
Spoke with patient and told her about message below. She stated she is unsure if she have been losing weight. She will get a scale and call back on Monday. Pt stated she has a hard time getting time off work she will call next week to schedule an appointment.

## 2018-06-08 NOTE — TELEPHONE ENCOUNTER
not usually a problem with prandin  Is she losing weight?     Suggest eval w NP/PA to eval for the leg sx

## 2018-06-21 ENCOUNTER — OFFICE VISIT (OUTPATIENT)
Dept: INTERNAL MEDICINE CLINIC | Age: 48
End: 2018-06-21

## 2018-06-21 VITALS
RESPIRATION RATE: 14 BRPM | SYSTOLIC BLOOD PRESSURE: 152 MMHG | DIASTOLIC BLOOD PRESSURE: 90 MMHG | HEIGHT: 64 IN | BODY MASS INDEX: 38.24 KG/M2 | TEMPERATURE: 98 F | OXYGEN SATURATION: 100 % | HEART RATE: 89 BPM | WEIGHT: 224 LBS

## 2018-06-21 DIAGNOSIS — H61.22 EXCESSIVE CERUMEN IN EAR CANAL, LEFT: ICD-10-CM

## 2018-06-21 DIAGNOSIS — J45.21 MILD INTERMITTENT ASTHMA WITH ACUTE EXACERBATION: Primary | ICD-10-CM

## 2018-06-21 DIAGNOSIS — J33.9 NASAL POLYP: ICD-10-CM

## 2018-06-21 RX ORDER — ALBUTEROL SULFATE 90 UG/1
1-2 AEROSOL, METERED RESPIRATORY (INHALATION)
Qty: 1 INHALER | Refills: 11 | Status: SHIPPED | OUTPATIENT
Start: 2018-06-21 | End: 2019-03-22 | Stop reason: SDUPTHER

## 2018-06-21 RX ORDER — PREDNISONE 20 MG/1
TABLET ORAL
Qty: 18 TAB | Refills: 0 | Status: SHIPPED | OUTPATIENT
Start: 2018-06-21 | End: 2018-10-17 | Stop reason: ALTCHOICE

## 2018-06-21 RX ORDER — FLUTICASONE FUROATE AND VILANTEROL 200; 25 UG/1; UG/1
1 POWDER RESPIRATORY (INHALATION) DAILY
Qty: 1 INHALER | Refills: 11 | Status: SHIPPED | OUTPATIENT
Start: 2018-06-21 | End: 2018-07-01

## 2018-06-21 NOTE — PROGRESS NOTES
1. Have you been to the ER, urgent care clinic or hospitalized since your last visit? NO.     2. Have you seen or consulted any other health care providers outside of the [address] [place] [address] since your last visit (Include any pap smears or colon screening)? NO      Do you have an Advanced Directive? NO    Would you like information on Advanced Directives?  NO

## 2018-06-21 NOTE — PROGRESS NOTES
HPI/History  Momo Flores is a 52 y.o.  female who presents for evaluation. Pt with hx of mild intermittent asthma. Reports paroxysmal cough x 2 wks. No fevers or signs of infectious process. Has been out of breo ellipta and albuterol for months. Occasionally takes allegra for her allergies. Patient Active Problem List   Diagnosis Code    Asthma J45.909    JOLIE and PLMD Dr. Temple Hodgkins  G47.33    Hypovitaminosis D E55.9    Dyslipidemia E78.5    Primary hypertension I10    Uncontrolled type 2 diabetes mellitus with microalbuminuria E11.29, E11.65, R80.9    Severe obesity (BMI 35.0-39. 9) with comorbidity (HCC) E66.01    Mild episode of recurrent major depressive disorder ([facility] [address] F33.0    Anxiety F41.9     Past Medical History:   Diagnosis Date    Acanthosis nigricans     Anxiety     Asthma     Depression     Diabetes mellitus ([facility]     Heavy menses 3/12    s/p endometrial ablation 3/16    History of CVA (cerebrovascular accident)     no deficits    History of iron deficiency anemia     Hyperlipidemia     Hypertension     Hypovitaminosis D 10/11    Morbid obesity (HCC)     peak weight 215 lbs, bmi 38.1 from 6/15; delcined dharmesh suppressants, med suprervised wt loss, bariatrics; IF 3/18    JOLIE (obstructive sleep apnea)     Dr. Kayli Prater cpap    Perennial allergic rhinitis      Past Surgical History:   Procedure Laterality Date    HX  SECTION      x2    HX COLONOSCOPY      Dr Trevino Dials 6/10 negative    HX GI  6/10    neg EGD Dr Ronal Richardson HX GYN      cyst removal    HX GYN      LEEP    HX HYSTEROSCOPY WITH ENDOMETRIAL ABLATION  3/16    Dr Deirdre Morales     Social History     Social History    Marital status:      Spouse name: N/A    Number of children: 2    Years of education: N/A     Occupational History    works at [employer] History Main Topics    Smoking status: Never Smoker    Smokeless tobacco: Never Used    Alcohol use Yes Comment: social drinker    Drug use: No    Sexual activity: Not on file     Other Topics Concern    Not on file     Social History Narrative     Family History   Problem Relation Age of Onset    Diabetes Mother      Current Outpatient Prescriptions   Medication Sig    albuterol (PROVENTIL HFA, VENTOLIN HFA, PROAIR HFA) 90 mcg/actuation inhaler Take 1-2 Puffs by inhalation every four (4) hours as needed for Wheezing.  fluticasone-vilanterol (BREO ELLIPTA) 200-25 mcg/dose inhaler Take 1 Puff by inhalation daily.  predniSONE (DELTASONE) 20 mg tablet Take 3 tabs by mouth daily x 3 days, then 2 tabs daily x 3 days, then 1 tab daily x 3 days.  amLODIPine (NORVASC) 10 mg tablet TAKE 1 TABLET BY MOUTH ONCE DAILY *CHANGE IN STRENGTH*    chlorthalidone (HYGROTEN) 25 mg tablet Take 1 Tab by mouth daily.  metFORMIN ER (GLUCOPHAGE XR) 500 mg tablet TAKE TWO TABLETS BY MOUTH ONCE DAILY WITH  DINNER    BYDUREON 2 mg serr INJECT ONE SYRINGE SUBCUTANEOUSLY ONCE A WEEK (EVERY  7  DAYS)    repaglinide (PRANDIN) 2 mg tablet Take 1 Tab by mouth Before breakfast, lunch, and dinner.  losartan (COZAAR) 100 mg tablet TAKE ONE TABLET BY MOUTH ONCE DAILY    atorvastatin (LIPITOR) 20 mg tablet TAKE ONE TABLET BY MOUTH ONCE DAILY    citalopram (CELEXA) 20 mg tablet TAKE ONE TABLET BY MOUTH ONCE DAILY    pioglitazone (ACTOS) 15 mg tablet 1 daily    ergocalciferol (ERGOCALCIFEROL) 50,000 unit capsule Take 1 Cap by mouth every seven (7) days.  Blood-Glucose Meter monitoring kit Pt checks blood sugar once daily, Dx E11.9    Lancets misc Pt checks blood sugar once daily, Dx E11.9    glucose blood VI test strips (BLOOD GLUCOSE TEST) strip Pt checks blood sugar once daily, Dx e11.9    metFORMIN ER (GLUCOPHAGE XR) 500 mg tablet Take 2 Tabs by mouth daily (with dinner).  cyanocobalamin 1,000 mcg tablet Take 1,000 mcg by mouth daily.     nystatin-triamcinolone (MYCOLOG II) topical cream Apply  to affected area two (2) times a day.  aspirin 81 mg chewable tablet Take 81 mg by mouth daily.  KLOR-CON M20 20 mEq tablet TAKE ONE TABLET BY MOUTH ONCE DAILY    triamterene-hydroCHLOROthiazide (DYAZIDE) 37.5-25 mg per capsule Reports not taking.  atenolol (TENORMIN) 50 mg tablet Reports not taking.  cloNIDine HCl (CATAPRES) 0.1 mg tablet Reports not taking.  magnesium 250 mg tab Reports not taking. No current facility-administered medications for this visit. Allergies   Allergen Reactions    Penicillins Unknown (comments)     Tongue swelling; difficulty swallowing; thrush    Compazine [Prochlorperazine] Unknown (comments)     Paranoia    Invokana [Canagliflozin] Other (comments)     Yeast infections       Review of Systems  Aside from those included in HPI, remainder of ROS negative. Physical Examination  Visit Vitals    /90 (BP 1 Location: Right arm, BP Patient Position: Sitting)    Pulse 89    Temp 98 °F (36.7 °C) (Oral)    Resp 14    Ht 5' 4\" (1.626 m)    Wt 224 lb (101.6 kg)    SpO2 100%    BMI 38.45 kg/m2       General - Alert and in no acute distress. Pt appears well, comfortable, and in good spirits. Very pleasant and engaging. Nontoxic. Not anxious, non-diaphoretic. Mental status - Appropriate mood, behavior, speech content, dress, and thought processes. Eyes - Pupils equal and reactive, extraocular movements intact. No erythema or discharge. Ears - Right canal and TM unremarkable. Left with excess cerumen obscuring view. Nose - Right nasal polyp noted. Bilat mucosa slightly violaceous. No significant secretions. Mouth - Mucous membranes moist. Pharynx without lesions, swelling, erythema, or exudate. Normal phonation. Neck - Supple without rigidity. Pulm - Paroxysmal mild dry cough. Full, complete sentences. No tachypnea, retractions, or cyanosis. Good respiratory effort. Clear to auscultation bilat. No appreciable wheezes, rales, or rhonchi.    Cardiovascular - Normal rate, regular rhythm. No appreciable murmurs or gallops. Assessment and Plan  1. Mild intermittent asthma with exacerbation - Refilled breo ellipta and albuterol. Steroid taper to help. Discussed general regimen considerations and future potentials. 2. Right nasal polyp - Not causing an issue but will start flonase, which may also help with above. 3. Left excess cerumen - Discussed OTC/home tx. Return/call if needed or developments. Further planning as warranted. Pt happily agrees with plan. PLEASE NOTE:   This document has been produced using voice recognition software. Unrecognized errors in transcription may be present.     [email or web address] of [address] [place] [address]  [phone]  6/21/2018

## 2018-06-21 NOTE — MR AVS SNAPSHOT
[address] [facility] [address] 
 
 
 [address], [address] [facility] 
[phone] Patient: Christy Goss MRN: [id number] Visit Information Date & Time Provider Department Dept. Phone Encounter #  
 6/21/2018 10:00 AM Eugene Velasquez [facility] [id number] Your Appointments 8/7/2018 11:00 AM  
Office Visit with Merlinda Posner, MD  
[facility]: ov per rd  
 [address] [facility], [address] [place] [address] [address] [place]  
  
   
 [address], [address] Upcoming Health Maintenance Date Due Pneumococcal 19-64 Medium Risk (1 of 1 - PPSV23) 12/15/1989 DTaP/Tdap/Td series (1 - Tdap) 12/15/1991 EYE EXAM RETINAL OR DILATED Q1 3/23/2019* Influenza Age 5 to Adult 8/1/2018 HEMOGLOBIN A1C Q6M 9/17/2018 FOOT EXAM Q1 11/10/2018 PAP AKA CERVICAL CYTOLOGY 2/24/2019 MICROALBUMIN Q1 3/17/2019 LIPID PANEL Q1 3/17/2019 COLONOSCOPY 6/1/2020 *Topic was postponed. The date shown is not the original due date. Allergies as of 6/21/2018  Review Complete On: 6/21/2018 By: Dennys Langford LPN Severity Noted Reaction Type Reactions Penicillins High 10/03/2011    Unknown (comments) Tongue swelling; difficulty swallowing; thrush Compazine [Prochlorperazine]  10/03/2011    Unknown (comments) Paranoia Invokana [Canagliflozin]  06/20/2016    Other (comments) Yeast infections Current Immunizations  Reviewed on 3/21/2018 Name Date Influenza Vaccine 10/1/2017, 10/1/2016, 10/1/2015 Not reviewed this visit You Were Diagnosed With   
  
 Codes Comments Mild persistent asthma without complication     NBH-27-OI: J45.30 ICD-9-CM: 493.90 Mild intermittent asthma without complication     CTB-20-TD: J45.20 ICD-9-CM: 493.90 Vitals BP Pulse Temp Resp Height(growth percentile) Weight(growth percentile) 152/90 (BP 1 Location: Right arm, BP Patient Position: Sitting) 89 98 °F (36.7 °C) (Oral) 14 5' 4\" (1.626 m) 224 lb (101.6 kg) SpO2 BMI OB Status Smoking Status 100% 38.45 kg/m2 Having regular periods Never Smoker Vitals History BMI and BSA Data Body Mass Index Body Surface Area  
 38.45 kg/m 2 2.14 m 2 Preferred Pharmacy Pharmacy Name Phone [address] [place] [address] [phone] Your Updated Medication List  
  
   
This list is accurate as of 6/21/18 10:33 AM.  Always use your most recent med list.  
  
  
  
  
 albuterol 90 mcg/actuation inhaler Commonly known as:  PROVENTIL HFA, VENTOLIN HFA, PROAIR HFA Take 2 Puffs by inhalation every six (6) hours as needed for Wheezing. amLODIPine 10 mg tablet Commonly known as:  Easton Lute TAKE 1 TABLET BY MOUTH ONCE DAILY *CHANGE IN STRENGTH*  
  
 aspirin 81 mg chewable tablet Take 81 mg by mouth daily. atenolol 50 mg tablet Commonly known as:  TENORMIN Take 100 mg by mouth daily. atorvastatin 20 mg tablet Commonly known as:  LIPITOR  
TAKE ONE TABLET BY MOUTH ONCE DAILY Blood-Glucose Meter monitoring kit Pt checks blood sugar once daily, Dx E11.9 BYDUREON 2 mg Serr Generic drug:  exenatide microspheres INJECT ONE SYRINGE SUBCUTANEOUSLY ONCE A WEEK (EVERY  7  DAYS)  
  
 chlorthalidone 25 mg tablet Commonly known as:  Gilles Gowers Take 1 Tab by mouth daily. citalopram 20 mg tablet Commonly known as:  CELEXA  
TAKE ONE TABLET BY MOUTH ONCE DAILY  
  
 cloNIDine HCl 0.1 mg tablet Commonly known as:  CATAPRES Take 1 Tab by mouth two (2) times a day. cyanocobalamin 1,000 mcg tablet Take 1,000 mcg by mouth daily. ergocalciferol 50,000 unit capsule Commonly known as:  ERGOCALCIFEROL Take 1 Cap by mouth every seven (7) days. fluticasone-vilanterol 200-25 mcg/dose inhaler Commonly known as:  BREO ELLIPTA Take 1 Puff by inhalation daily. glucose blood VI test strips strip Commonly known as:  blood glucose test  
Pt checks blood sugar once daily, Dx e11.9 KLOR-CON M20 20 mEq tablet Generic drug:  potassium chloride TAKE ONE TABLET BY MOUTH ONCE DAILY Lancets Misc Pt checks blood sugar once daily, Dx E11.9  
  
 losartan 100 mg tablet Commonly known as:  COZAAR  
TAKE ONE TABLET BY MOUTH ONCE DAILY  
  
 magnesium 250 mg Tab Take 250 mg by mouth daily. * metFORMIN  mg tablet Commonly known as:  GLUCOPHAGE XR Take 2 Tabs by mouth daily (with dinner). * metFORMIN  mg tablet Commonly known as:  GLUCOPHAGE XR  
TAKE TWO TABLETS BY MOUTH ONCE DAILY WITH  DINNER  
  
 nystatin-triamcinolone topical cream  
Commonly known as:  MYCOLOG II Apply  to affected area two (2) times a day. pioglitazone 15 mg tablet Commonly known as:  ACTOS  
1 daily  
  
 repaglinide 2 mg tablet Commonly known as:  Floy Fears Take 1 Tab by mouth Before breakfast, lunch, and dinner. triamterene-hydroCHLOROthiazide 37.5-25 mg per capsule Commonly known as:  Pearle Finely Take 1 Cap by mouth daily. * Notice: This list has 2 medication(s) that are the same as other medications prescribed for you. Read the directions carefully, and ask your doctor or other care provider to review them with you. Introducing [facility]! [facility] introduces [employer] patient portal. Now you can access parts of your medical record, email your doctor's office, and request medication refills online. 1. In your internet browser, go to [email or web address] 2. Click on the First Time User? Click Here link in the Sign In box. You will see the New Member Sign Up page. 3. Enter your [employer] Access Code exactly as it appears below.  You will not need to use this code after youve completed the sign-up process. If you do not sign up before the expiration date, you must request a new code. · [employer] Access Code: [email or web address] Expires: 6/21/2018 10:50 PM 
 
4. Enter the last four digits of your Social Security Number (xxxx) and Date of Birth (mm/dd/yyyy) as indicated and click Submit. You will be taken to the next sign-up page. 5. Create a [employer] ID. This will be your [employer] login ID and cannot be changed, so think of one that is secure and easy to remember. 6. Create a [employer] password. You can change your password at any time. 7. Enter your Password Reset Question and Answer. This can be used at a later time if you forget your password. 8. Enter your e-mail address. You will receive e-mail notification when new information is available in [address] 9. Click Sign Up. You can now view and download portions of your medical record. 10. Click the Download Summary menu link to download a portable copy of your medical information. If you have questions, please visit the Frequently Asked Questions section of the [employer] website. Remember, [employer] is NOT to be used for urgent needs. For medical emergencies, dial 911. Now available from your iPhone and Android! Please provide this summary of care documentation to your next provider. Your primary care clinician is listed as Julianna Hutton. If you have any questions after today's visit, please call [phone].

## 2018-07-01 ENCOUNTER — HOSPITAL ENCOUNTER (EMERGENCY)
Age: 48
Discharge: HOME | End: 2018-07-01
Attending: EMERGENCY MEDICINE | Admitting: EMERGENCY MEDICINE
Payer: COMMERCIAL

## 2018-07-01 ENCOUNTER — APPOINTMENT (OUTPATIENT)
Dept: GENERAL RADIOLOGY | Age: 48
End: 2018-07-01
Attending: PHYSICIAN ASSISTANT
Payer: COMMERCIAL

## 2018-07-01 VITALS
TEMPERATURE: 98.6 F | WEIGHT: 210 LBS | HEART RATE: 108 BPM | BODY MASS INDEX: 34.99 KG/M2 | SYSTOLIC BLOOD PRESSURE: 172 MMHG | RESPIRATION RATE: 20 BRPM | OXYGEN SATURATION: 98 % | DIASTOLIC BLOOD PRESSURE: 107 MMHG | HEIGHT: 65 IN

## 2018-07-01 DIAGNOSIS — R05.9 COUGH: Primary | ICD-10-CM

## 2018-07-01 DIAGNOSIS — J02.9 SORE THROAT: ICD-10-CM

## 2018-07-01 DIAGNOSIS — T50.905A ADVERSE EFFECT OF DRUG, INITIAL ENCOUNTER: ICD-10-CM

## 2018-07-01 PROCEDURE — 99281 EMR DPT VST MAYX REQ PHY/QHP: CPT

## 2018-07-01 PROCEDURE — 87081 CULTURE SCREEN ONLY: CPT | Performed by: PHYSICIAN ASSISTANT

## 2018-07-01 PROCEDURE — 71046 X-RAY EXAM CHEST 2 VIEWS: CPT

## 2018-07-01 RX ORDER — BUDESONIDE AND FORMOTEROL FUMARATE DIHYDRATE 160; 4.5 UG/1; UG/1
2 AEROSOL RESPIRATORY (INHALATION) 2 TIMES DAILY
Qty: 1 INHALER | Refills: 0 | Status: SHIPPED | OUTPATIENT
Start: 2018-07-01 | End: 2019-03-20 | Stop reason: SDUPTHER

## 2018-07-01 RX ORDER — BENZONATATE 100 MG/1
100 CAPSULE ORAL
Qty: 30 CAP | Refills: 0 | Status: SHIPPED | OUTPATIENT
Start: 2018-07-01 | End: 2018-07-08

## 2018-07-01 RX ORDER — CODEINE PHOSPHATE AND GUAIFENESIN 10; 100 MG/5ML; MG/5ML
10 SOLUTION ORAL
Qty: 118 ML | Refills: 0 | Status: SHIPPED | OUTPATIENT
Start: 2018-07-01 | End: 2019-03-22

## 2018-07-01 NOTE — ED TRIAGE NOTES
Pt was put on prednisone, breo and albuterol on 6/25.  C/o increased cough and sore throat since saturday

## 2018-07-01 NOTE — ED PROVIDER NOTES
EMERGENCY DEPARTMENT HISTORY AND PHYSICAL EXAM    Date: 7/1/2018  Patient Name: Marily Gordillo    History of Presenting Illness     Chief Complaint   Patient presents with    Cough    Sore Throat         History Provided By: patient     Chief Complaint: sore throat, cough   Duration: few days  Timing:  Gradual onset  Location: throat, chest   Quality:burning in throat and chest   Severity: moderate  Modifying Factors: none   Associated Symptoms: none       Additional History (Context): Marily Gordillo is a 52 y.o. female with PMH asthma, DM, htn, anxiety, depression, and hyperlipidemia who presents with complaints of cough and sore throat x a few days. Pt states she was recently started on Breo, a new maintenance inhaler. Sx began after starting this medication. States she has \"a burning pain in the chest and esophagus. \" No other complaints. PCP: Miles May MD    Current Outpatient Prescriptions   Medication Sig Dispense Refill    budesonide-formoterol (SYMBICORT) 160-4.5 mcg/actuation HFAA Take 2 Puffs by inhalation two (2) times a day. 1 Inhaler 0    benzonatate (TESSALON PERLES) 100 mg capsule Take 1 Cap by mouth three (3) times daily as needed for Cough for up to 7 days. 30 Cap 0    guaiFENesin-codeine (CHERATUSSIN AC) 100-10 mg/5 mL solution Take 10 mL by mouth three (3) times daily as needed for Cough. Max Daily Amount: 30 mL. 118 mL 0    albuterol (PROVENTIL HFA, VENTOLIN HFA, PROAIR HFA) 90 mcg/actuation inhaler Take 1-2 Puffs by inhalation every four (4) hours as needed for Wheezing. 1 Inhaler 11    predniSONE (DELTASONE) 20 mg tablet Take 3 tabs by mouth daily x 3 days, then 2 tabs daily x 3 days, then 1 tab daily x 3 days. 18 Tab 0    amLODIPine (NORVASC) 10 mg tablet TAKE 1 TABLET BY MOUTH ONCE DAILY *CHANGE IN STRENGTH* 90 Tab 0    chlorthalidone (HYGROTEN) 25 mg tablet Take 1 Tab by mouth daily.  90 Tab 1    metFORMIN ER (GLUCOPHAGE XR) 500 mg tablet TAKE TWO TABLETS BY MOUTH ONCE DAILY WITH  DINNER 180 Tab 0    BYDUREON 2 mg serr INJECT ONE SYRINGE SUBCUTANEOUSLY ONCE A WEEK (EVERY  7  DAYS) 4 Each 11    triamterene-hydroCHLOROthiazide (DYAZIDE) 37.5-25 mg per capsule Take 1 Cap by mouth daily. 90 Cap 3    repaglinide (PRANDIN) 2 mg tablet Take 1 Tab by mouth Before breakfast, lunch, and dinner. 90 Tab 11    atorvastatin (LIPITOR) 20 mg tablet TAKE ONE TABLET BY MOUTH ONCE DAILY 90 Tab 3    ergocalciferol (ERGOCALCIFEROL) 50,000 unit capsule Take 1 Cap by mouth every seven (7) days. 13 Cap 3    cloNIDine HCl (CATAPRES) 0.1 mg tablet Take 1 Tab by mouth two (2) times a day. 90 Tab sched ov bm or sl p    cyanocobalamin 1,000 mcg tablet Take 1,000 mcg by mouth daily.  nystatin-triamcinolone (MYCOLOG II) topical cream Apply  to affected area two (2) times a day. 15 g 0    aspirin 81 mg chewable tablet Take 81 mg by mouth daily.  KLOR-CON M20 20 mEq tablet TAKE ONE TABLET BY MOUTH ONCE DAILY 90 Tab 0    losartan (COZAAR) 100 mg tablet TAKE ONE TABLET BY MOUTH ONCE DAILY 90 Tab 3    citalopram (CELEXA) 20 mg tablet TAKE ONE TABLET BY MOUTH ONCE DAILY 90 Tab 3    pioglitazone (ACTOS) 15 mg tablet 1 daily 30 Tab 5    Blood-Glucose Meter monitoring kit Pt checks blood sugar once daily, Dx E11.9 1 Kit 0    Lancets misc Pt checks blood sugar once daily, Dx E11.9 100 Each 3    glucose blood VI test strips (BLOOD GLUCOSE TEST) strip Pt checks blood sugar once daily, Dx e11.9 100 Strip 3    metFORMIN ER (GLUCOPHAGE XR) 500 mg tablet Take 2 Tabs by mouth daily (with dinner).  180 Tab 0       Past History     Past Medical History:  Past Medical History:   Diagnosis Date    Acanthosis nigricans 7/13    Anxiety     Asthma     Depression     Diabetes mellitus ([facility] [address]     Heavy menses 3/12    s/p endometrial ablation 3/16    History of CVA (cerebrovascular accident) 1996    no deficits    History of iron deficiency anemia     Hyperlipidemia     Hypertension     Hypovitaminosis D 10/11    Morbid obesity (HCC)     peak weight 215 lbs, bmi 38.1 from 6/15; delcined dharmesh suppressants, med suprervised wt loss, bariatrics; IF 3/18    JOLIE (obstructive sleep apnea)     Dr. Rasheed Music cpap    Perennial allergic rhinitis        Past Surgical History:  Past Surgical History:   Procedure Laterality Date    HX  SECTION      x2    HX COLONOSCOPY      Dr Madonna Royal 6/10 negative    HX GI  6/10    neg EGD Dr Janice Castro HX GYN      cyst removal    HX GYN      LEEP    HX HYSTEROSCOPY WITH ENDOMETRIAL ABLATION  3/16    Dr Leta Randolph       Family History:  Family History   Problem Relation Age of Onset    Diabetes Mother        Social History:  Social History   Substance Use Topics    Smoking status: Never Smoker    Smokeless tobacco: Never Used    Alcohol use Yes      Comment: social drinker       Allergies: Allergies   Allergen Reactions    Penicillins Unknown (comments)     Tongue swelling; difficulty swallowing; thrush    Compazine [Prochlorperazine] Unknown (comments)     Paranoia    Invokana [Canagliflozin] Other (comments)     Yeast infections         Review of Systems   Review of Systems   Constitutional: Negative. Negative for chills and fever. HENT: Positive for sore throat. Negative for congestion, ear pain and rhinorrhea. Eyes: Negative. Negative for pain and redness. Respiratory: Positive for cough. Negative for shortness of breath, wheezing and stridor. Cardiovascular: Negative. Negative for chest pain and leg swelling. Gastrointestinal: Negative for abdominal pain, constipation, diarrhea, nausea and vomiting. Esophagitis    Genitourinary: Negative. Negative for dysuria and frequency. Musculoskeletal: Negative. Negative for back pain and neck pain. Skin: Negative. Negative for rash and wound. Neurological: Negative. Negative for dizziness, seizures, syncope and headaches. All other systems reviewed and are negative.     All Other Systems Negative  Physical Exam     Vitals:    07/01/18 1410   BP: (!) 172/107   Pulse: (!) 114   Resp: 20   Temp: 98.6 °F (37 °C)   SpO2: 98%   Weight: 95.3 kg (210 lb)   Height: 5' 5\" (1.651 m)     Physical Exam   Constitutional: She is oriented to person, place, and time. She appears well-developed and well-nourished. She appears distressed. Mildly distressed   HENT:   Head: Normocephalic and atraumatic. Nose: Nose normal.   Mouth/Throat: Oropharynx is clear and moist. No oropharyngeal exudate. Eyes: Conjunctivae are normal. Right eye exhibits no discharge. Left eye exhibits no discharge. No scleral icterus. Neck: Normal range of motion. Neck supple. Cardiovascular: Regular rhythm and normal heart sounds. Exam reveals no gallop and no friction rub. No murmur heard. Slightly tachycardiac    Pulmonary/Chest: Effort normal and breath sounds normal. No stridor. No respiratory distress. She has no wheezes. She has no rales. Musculoskeletal: Normal range of motion. Neurological: She is alert and oriented to person, place, and time. Coordination normal.   Gait is steady. Able to ambulate without difficulty. Skin: Skin is warm and dry. No rash noted. She is not diaphoretic. No erythema. Psychiatric: She has a normal mood and affect. Her behavior is normal. Thought content normal.   Nursing note and vitals reviewed. Diagnostic Study Results     Labs -     Recent Results (from the past 12 hour(s))   STREP THROAT SCREEN    Collection Time: 07/01/18  2:15 PM   Result Value Ref Range    Special Requests: NO SPECIAL REQUESTS      Strep Screen NEGATIVE       Culture result: PENDING        Radiologic Studies -   XR CHEST PA LAT   Final Result        CT Results  (Last 48 hours)    None        CXR Results  (Last 48 hours)               07/01/18 1437  XR CHEST PA LAT Final result    Impression:  IMPRESSION:       No acute findings in the chest.           Narrative:  Chest AP and Lateral views. HISTORY:  cough       COMPARISON: December 24, 2012. FINDINGS:        PA and lateral views of the chest were obtained. The cardiac silhouette is normal.        The lungs are clear. Pulmonary vascularity is normal. The costophrenic angles   are sharply defined. Lateral view showed sharp posterior costophrenic angles. Thoracic spine demonstrated no compression fractures. Medical Decision Making   I am the first provider for this patient. I reviewed the vital signs, available nursing notes, past medical history, past surgical history, family history and social history. Vital Signs-Reviewed the patient's vital signs. Records Reviewed: Shelly Aguilar PA-C     Procedures:  Procedures none     Provider Notes (Medical Decision Making): Impression:  Medication reaction, cough, sore throat   Pt states she has taken symbicort before with no complications, will d/c Breo and prescribe symbicort. MED RECONCILIATION:  No current facility-administered medications for this encounter. Current Outpatient Prescriptions   Medication Sig    budesonide-formoterol (SYMBICORT) 160-4.5 mcg/actuation HFAA Take 2 Puffs by inhalation two (2) times a day.  benzonatate (TESSALON PERLES) 100 mg capsule Take 1 Cap by mouth three (3) times daily as needed for Cough for up to 7 days.  guaiFENesin-codeine (CHERATUSSIN AC) 100-10 mg/5 mL solution Take 10 mL by mouth three (3) times daily as needed for Cough. Max Daily Amount: 30 mL.  albuterol (PROVENTIL HFA, VENTOLIN HFA, PROAIR HFA) 90 mcg/actuation inhaler Take 1-2 Puffs by inhalation every four (4) hours as needed for Wheezing.  predniSONE (DELTASONE) 20 mg tablet Take 3 tabs by mouth daily x 3 days, then 2 tabs daily x 3 days, then 1 tab daily x 3 days.  amLODIPine (NORVASC) 10 mg tablet TAKE 1 TABLET BY MOUTH ONCE DAILY *CHANGE IN STRENGTH*    chlorthalidone (HYGROTEN) 25 mg tablet Take 1 Tab by mouth daily.     metFORMIN ER (GLUCOPHAGE XR) 500 mg tablet TAKE TWO TABLETS BY MOUTH ONCE DAILY WITH  DINNER    BYDUREON 2 mg serr INJECT ONE SYRINGE SUBCUTANEOUSLY ONCE A WEEK (EVERY  7  DAYS)    triamterene-hydroCHLOROthiazide (DYAZIDE) 37.5-25 mg per capsule Take 1 Cap by mouth daily.  repaglinide (PRANDIN) 2 mg tablet Take 1 Tab by mouth Before breakfast, lunch, and dinner.  atorvastatin (LIPITOR) 20 mg tablet TAKE ONE TABLET BY MOUTH ONCE DAILY    ergocalciferol (ERGOCALCIFEROL) 50,000 unit capsule Take 1 Cap by mouth every seven (7) days.  cloNIDine HCl (CATAPRES) 0.1 mg tablet Take 1 Tab by mouth two (2) times a day.  cyanocobalamin 1,000 mcg tablet Take 1,000 mcg by mouth daily.  nystatin-triamcinolone (MYCOLOG II) topical cream Apply  to affected area two (2) times a day.  aspirin 81 mg chewable tablet Take 81 mg by mouth daily.  KLOR-CON M20 20 mEq tablet TAKE ONE TABLET BY MOUTH ONCE DAILY    losartan (COZAAR) 100 mg tablet TAKE ONE TABLET BY MOUTH ONCE DAILY    citalopram (CELEXA) 20 mg tablet TAKE ONE TABLET BY MOUTH ONCE DAILY    pioglitazone (ACTOS) 15 mg tablet 1 daily    Blood-Glucose Meter monitoring kit Pt checks blood sugar once daily, Dx E11.9    Lancets misc Pt checks blood sugar once daily, Dx E11.9    glucose blood VI test strips (BLOOD GLUCOSE TEST) strip Pt checks blood sugar once daily, Dx e11.9    metFORMIN ER (GLUCOPHAGE XR) 500 mg tablet Take 2 Tabs by mouth daily (with dinner). Disposition:  discharged    DISCHARGE NOTE:     Pt has been reexamined. Patient has no new complaints, changes, or physical findings. Care plan outlined and precautions discussed. Results of the x-ray and RST were reviewed with the patient. All medications were reviewed with the patient; will d/c home with tessalon, cheratussin, and symbicort. All of pt's questions and concerns were addressed.  Patient was instructed and agrees to follow up with PCP, as well as to return to the ED upon further deterioration. Patient is ready to go home. Shelly Aguilar PA-C     Follow-up Information     Follow up With Details Comments [address] Bee Forbes MD Schedule an appointment as soon as possible for a visit in 1 week  [facility]  [address] [facility] [address]  [address] EMERGENCY DEPT  As needed, If symptoms worsen [address] [facility]  [phone]          Discharge Medication List as of 7/1/2018  3:06 PM      START taking these medications    Details   budesonide-formoterol (SYMBICORT) 160-4.5 mcg/actuation HFAA Take 2 Puffs by inhalation two (2) times a day., Print, Disp-1 Inhaler, R-0      benzonatate (TESSALON PERLES) 100 mg capsule Take 1 Cap by mouth three (3) times daily as needed for Cough for up to 7 days. , Print, Disp-30 Cap, R-0      guaiFENesin-codeine (CHERATUSSIN AC) 100-10 mg/5 mL solution Take 10 mL by mouth three (3) times daily as needed for Cough. Max Daily Amount: 30 mL., Print, Disp-118 mL, R-0         CONTINUE these medications which have NOT CHANGED    Details   albuterol (PROVENTIL HFA, VENTOLIN HFA, PROAIR HFA) 90 mcg/actuation inhaler Take 1-2 Puffs by inhalation every four (4) hours as needed for Wheezing., Normal, Disp-1 Inhaler, R-11      predniSONE (DELTASONE) 20 mg tablet Take 3 tabs by mouth daily x 3 days, then 2 tabs daily x 3 days, then 1 tab daily x 3 days. , Normal, Disp-18 Tab, R-0      amLODIPine (NORVASC) 10 mg tablet TAKE 1 TABLET BY MOUTH ONCE DAILY *CHANGE IN STRENGTH*, Normal, Disp-90 Tab, R-0      chlorthalidone (HYGROTEN) 25 mg tablet Take 1 Tab by mouth daily. , Normal, Disp-90 Tab, R-1      !! metFORMIN ER (GLUCOPHAGE XR) 500 mg tablet TAKE TWO TABLETS BY MOUTH ONCE DAILY WITH  DINNER, Normal, Disp-180 Tab, R-0      BYDUREON 2 mg serr INJECT ONE SYRINGE SUBCUTANEOUSLY ONCE A WEEK (EVERY  7  DAYS), NormalPlease consider 90 day supplies to promote better adherenceDisp-4 Each, R-11 triamterene-hydroCHLOROthiazide (DYAZIDE) 37.5-25 mg per capsule Take 1 Cap by mouth daily. , Normal, Disp-90 Cap, R-3      repaglinide (PRANDIN) 2 mg tablet Take 1 Tab by mouth Before breakfast, lunch, and dinner., Normal, Disp-90 Tab, R-11      atorvastatin (LIPITOR) 20 mg tablet TAKE ONE TABLET BY MOUTH ONCE DAILY, Normal, Disp-90 Tab, R-3      ergocalciferol (ERGOCALCIFEROL) 50,000 unit capsule Take 1 Cap by mouth every seven (7) days. , Normal, Disp-13 Cap, R-3      cloNIDine HCl (CATAPRES) 0.1 mg tablet Take 1 Tab by mouth two (2) times a day., Print, Disp-90 Tab, R-sched ov bm or sl p      cyanocobalamin 1,000 mcg tablet Take 1,000 mcg by mouth daily. , Historical Med      nystatin-triamcinolone (MYCOLOG II) topical cream Apply  to affected area two (2) times a day., Normal, Disp-15 g, R-0      aspirin 81 mg chewable tablet Take 81 mg by mouth daily. , Historical Med      KLOR-CON M20 20 mEq tablet TAKE ONE TABLET BY MOUTH ONCE DAILY, Normal, Disp-90 Tab, R-0      losartan (COZAAR) 100 mg tablet TAKE ONE TABLET BY MOUTH ONCE DAILY, Normal, Disp-90 Tab, R-3      citalopram (CELEXA) 20 mg tablet TAKE ONE TABLET BY MOUTH ONCE DAILY, Normal, Disp-90 Tab, R-3      pioglitazone (ACTOS) 15 mg tablet 1 daily, Normal, Disp-30 Tab, R-5      Blood-Glucose Meter monitoring kit Pt checks blood sugar once daily, Dx E11.9, Normal, Disp-1 Kit, R-0      Lancets misc Pt checks blood sugar once daily, Dx E11.9, Normal, Disp-100 Each, R-3      glucose blood VI test strips (BLOOD GLUCOSE TEST) strip Pt checks blood sugar once daily, Dx e11.9, Normal, Disp-100 Strip, R-3      !! metFORMIN ER (GLUCOPHAGE XR) 500 mg tablet Take 2 Tabs by mouth daily (with dinner). , Normal, Disp-180 Tab, R-0       !! - Potential duplicate medications found. Please discuss with provider.       STOP taking these medications       fluticasone-vilanterol (BREO ELLIPTA) 200-25 mcg/dose inhaler Comments:   Reason for Stopping:                 Diagnosis Clinical Impression:   1. Cough    2. Medication reaction   3.  Sore throat

## 2018-07-01 NOTE — LETTER
NOTIFICATION OF RETURN TO WORK / SCHOOL 
 
7/1/2018 Ms. Anastacio Bautista [address] [place] [address],[address] [facility] [id number] To Whom It May Concern: 
 
Anastacio Bautista was seen in the ED on 7/1/18 and may be excused from work for 2 days. Sincerely, April [place], [place]

## 2018-07-01 NOTE — ED NOTES
Patient armband removed and shredded  Pt discharged home with after care instructions given to pt . Pt verbalizes understand of after care instructions.  Return to the ED for any worsening symptoms and follow with primary care doctor as directed   Edin Sierra RN

## 2018-07-01 NOTE — DISCHARGE INSTRUCTIONS
Cough: Care Instructions  Your Care Instructions    A cough is your body's response to something that bothers your throat or airways. Many things can cause a cough. You might cough because of a cold or the flu, bronchitis, or asthma. Smoking, postnasal drip, allergies, and stomach acid that backs up into your throat also can cause coughs. A cough is a symptom, not a disease. Most coughs stop when the cause, such as a cold, goes away. You can take a few steps at home to cough less and feel better. Follow-up care is a key part of your treatment and safety. Be sure to make and go to all appointments, and call your doctor if you are having problems. It's also a good idea to know your test results and keep a list of the medicines you take. How can you care for yourself at home? · Drink lots of water and other fluids. This helps thin the mucus and soothes a dry or sore throat. Honey or lemon juice in hot water or tea may ease a dry cough. · Take cough medicine as directed by your doctor. · Prop up your head on pillows to help you breathe and ease a dry cough. · Try cough drops to soothe a dry or sore throat. Cough drops don't stop a cough. Medicine-flavored cough drops are no better than candy-flavored drops or hard candy. · Do not smoke. Avoid secondhand smoke. If you need help quitting, talk to your doctor about stop-smoking programs and medicines. These can increase your chances of quitting for good. When should you call for help? Call 911 anytime you think you may need emergency care. For example, call if:  ? · You have severe trouble breathing. ?Call your doctor now or seek immediate medical care if:  ? · You cough up blood. ? · You have new or worse trouble breathing. ? · You have a new or higher fever. ? · You have a new rash. ? Watch closely for changes in your health, and be sure to contact your doctor if:  ? · You cough more deeply or more often, especially if you notice more mucus or a change in the color of your mucus. ? · You have new symptoms, such as a sore throat, an earache, or sinus pain. ? · You do not get better as expected. Where can you learn more? Go to http://thompson-alexandre.info/. Enter D279 in the search box to learn more about \"Cough: Care Instructions. \"  Current as of: May 12, 2017  Content Version: 11.4  © 8741-8611 [employer]. Care instructions adapted under license by [employer] (which disclaims liability or warranty for this information). If you have questions about a medical condition or this instruction, always ask your healthcare professional. [address] any warranty or liability for your use of this information. [employer] Activation    Thank you for requesting access to [employer]. Please follow the instructions below to securely access and download your online medical record. [employer] allows you to send messages to your doctor, view your test results, renew your prescriptions, schedule appointments, and more. How Do I Sign Up? 1. In your internet browser, go to [email or web address]  2. Click on the First Time User? Click Here link in the Sign In box. You will be redirect to the New Member Sign Up page. 3. Enter your [employer] Access Code exactly as it appears below. You will not need to use this code after youve completed the sign-up process. If you do not sign up before the expiration date, you must request a new code. [employer] Access Code: [email or web address]  Expires: 2018  2:11 PM (This is the date your [employer] access code will )    4. Enter the last four digits of your Social Security Number (xxxx) and Date of Birth (mm/dd/yyyy) as indicated and click Submit. You will be taken to the next sign-up page. 5. Create a [employer] ID. This will be your [employer] login ID and cannot be changed, so think of one that is secure and easy to remember. 6. Create a [employer] password.  You can change your password at any time. 7. Enter your Password Reset Question and Answer. This can be used at a later time if you forget your password. 8. Enter your e-mail address. You will receive e-mail notification when new information is available in [address] 9. Click Sign Up. You can now view and download portions of your medical record. 10. Click the Download Summary menu link to download a portable copy of your medical information. Additional Information    If you have questions, please visit the Frequently Asked Questions section of the [employer] website at [email or web address] Remember, [employer] is NOT to be used for urgent needs. For medical emergencies, dial 911.

## 2018-07-03 DIAGNOSIS — J40 BRONCHITIS: Primary | ICD-10-CM

## 2018-07-03 LAB
B-HEM STREP THROAT QL CULT: NEGATIVE
BACTERIA SPEC CULT: NORMAL
SERVICE CMNT-IMP: NORMAL

## 2018-07-03 RX ORDER — DOXYCYCLINE 100 MG/1
100 TABLET ORAL 2 TIMES DAILY
Qty: 20 TAB | Refills: 0 | Status: SHIPPED | OUTPATIENT
Start: 2018-07-03 | End: 2018-07-13

## 2018-07-03 RX ORDER — FLUCONAZOLE 150 MG/1
150 TABLET ORAL DAILY
Qty: 1 TAB | Refills: 1 | Status: SHIPPED | OUTPATIENT
Start: 2018-07-03 | End: 2018-08-01 | Stop reason: SDUPTHER

## 2018-07-03 NOTE — TELEPHONE ENCOUNTER
Patient stating she saw Yvette Kruse last week and was prescribed Prednisone. She has a yeast infection now and wants to know if something can be called in to [facility]. She is also stating she was seen in the ER on sunday and was given Symbicort, Tessalon Pearls and Cheratussin. She is bring up colored mucous now and wants to know if she should be put on an antibiotic.

## 2018-08-01 RX ORDER — FLUCONAZOLE 150 MG/1
TABLET ORAL
Qty: 1 TAB | Refills: 1 | Status: SHIPPED | OUTPATIENT
Start: 2018-08-01 | End: 2018-10-17 | Stop reason: SDUPTHER

## 2018-08-10 RX ORDER — PIOGLITAZONEHYDROCHLORIDE 30 MG/1
TABLET ORAL
Qty: 90 TAB | Refills: 3 | Status: SHIPPED | OUTPATIENT
Start: 2018-08-10 | End: 2018-08-15 | Stop reason: SDUPTHER

## 2018-08-15 ENCOUNTER — TELEPHONE (OUTPATIENT)
Dept: INTERNAL MEDICINE CLINIC | Age: 48
End: 2018-08-15

## 2018-08-15 RX ORDER — PIOGLITAZONEHYDROCHLORIDE 15 MG/1
15 TABLET ORAL DAILY
Qty: 90 TAB | Refills: 1 | Status: SHIPPED | OUTPATIENT
Start: 2018-08-15 | End: 2018-10-17 | Stop reason: SDUPTHER

## 2018-08-15 NOTE — TELEPHONE ENCOUNTER
Pharmacist with [facility] requests a 90 d/s of Actos 15 mg tabs on the patient's behalf. He states the patient has been unsuccessful with splitting the tablets. Requested Prescriptions     Pending Prescriptions Disp Refills    pioglitazone (ACTOS) 15 mg tablet 90 Tab 1     Sig: Take 1 Tab by mouth daily.      Signed Prescriptions Disp Refills    pioglitazone (ACTOS) 30 mg tablet 90 Tab 3     Sig: TAKE 1/2 (ONE-HALF) TABLET BY MOUTH ONCE DAILY     Authorizing Provider: Miguel Mon

## 2018-08-15 NOTE — TELEPHONE ENCOUNTER
[place] off [place] paperwork. Request that Dr. Rachel Moe sign it. Please advise patient at your earliest convenience.

## 2018-08-16 NOTE — TELEPHONE ENCOUNTER
When he completes form, it will be given to front office staff most likely to contact patient to pick-up

## 2018-08-20 NOTE — TELEPHONE ENCOUNTER
Patient called back. The FMLA was denied on her end because she doesn't have enough hours. Can disregard the forms.

## 2018-08-20 NOTE — TELEPHONE ENCOUNTER
BRONSON[place]CANANTH regarding her [facility] paperwork. Dr. Shandra Hylton needs to know what the [facility] paperwork is for, and which illness.

## 2018-09-10 RX ORDER — POTASSIUM CHLORIDE 1500 MG/1
TABLET, EXTENDED RELEASE ORAL
Qty: 90 TAB | Refills: 0 | Status: SHIPPED | OUTPATIENT
Start: 2018-09-10 | End: 2018-10-17 | Stop reason: SDUPTHER

## 2018-09-20 DIAGNOSIS — E78.5 DYSLIPIDEMIA: ICD-10-CM

## 2018-09-22 DIAGNOSIS — E55.9 HYPOVITAMINOSIS D: ICD-10-CM

## 2018-10-09 ENCOUNTER — TELEPHONE (OUTPATIENT)
Dept: INTERNAL MEDICINE CLINIC | Age: 48
End: 2018-10-09

## 2018-10-09 NOTE — TELEPHONE ENCOUNTER
DIGITAL SCREENING MAMMOGRAM:  07/12/2017 

 

CLINICAL INDICATION:  A 79-year-old for screening. 

 

COMPARISON:  07/2016, 07/2015, 07/2014, 07/2013, 06/2012, 06/2011, 05/2010, 04/2009, 04/2008. 

 

TECHNIQUE:  Routine CC and MLO projections were obtained of the breasts. 

 

The breasts demonstrate scattered fibroglandular densities bilaterally.  Coarse and punctate, typical
ly benign calcifications are present.  No suspicious masses, clustered microcalcifications, or region
s of architectural distortion are identified. 

 

IMPRESSION:  BENIGN FINDINGS. 

 

RECOMMENDATION:  ROUTINE ANNUAL SCREENING UNLESS OTHERWISE CLINICALLY INDICATED. 

 

BIRADS CATEGORY:  2, BENIGN FINDINGS. 

 

STANDARD QUALIFYING STATEMENTS

1.  This examination was reviewed with the aid of Computed-Aided Detection (CAD).

2.  A negative or benign imaging report should not delay biopsy if clinically suspicious findings are
 present.  Consider surgical consultation if warranted.  More than 5% of cancers are not identified b
y imaging.

3.  Dense breasts may obscure an underlying neoplasm. 

 

 

 

DD:07/13/2017 14:57:24  DT: 07/13/2017 20:00  JOB #: [id number]  EXT JOB #:[id number] Patient dropped off forms from occupational health for ADA accomodation. She has to turn these back in by Oct 15th. Please call patient once complete and ready for .   [phone]

## 2018-10-14 ENCOUNTER — HOSPITAL ENCOUNTER (EMERGENCY)
Age: 48
Discharge: HOME | End: 2018-10-14
Attending: EMERGENCY MEDICINE
Payer: COMMERCIAL

## 2018-10-14 VITALS
TEMPERATURE: 98.7 F | BODY MASS INDEX: 35.28 KG/M2 | WEIGHT: 212 LBS | SYSTOLIC BLOOD PRESSURE: 133 MMHG | OXYGEN SATURATION: 99 % | DIASTOLIC BLOOD PRESSURE: 68 MMHG | HEART RATE: 80 BPM | RESPIRATION RATE: 14 BRPM

## 2018-10-14 DIAGNOSIS — R11.0 NAUSEA WITHOUT VOMITING: Primary | ICD-10-CM

## 2018-10-14 LAB
ANION GAP SERPL CALC-SCNC: 11 MMOL/L (ref 3–18)
ANION GAP SERPL CALC-SCNC: 8 MMOL/L (ref 3–18)
APPEARANCE UR: CLEAR
ATRIAL RATE: 89 BPM
BASOPHILS # BLD: 0 K/UL (ref 0–0.1)
BASOPHILS NFR BLD: 0 % (ref 0–2)
BILIRUB UR QL: NEGATIVE
BUN SERPL-MCNC: 22 MG/DL (ref 7–18)
BUN SERPL-MCNC: 26 MG/DL (ref 7–18)
BUN/CREAT SERPL: 18 (ref 12–20)
BUN/CREAT SERPL: 18 (ref 12–20)
CALCIUM SERPL-MCNC: 8.8 MG/DL (ref 8.5–10.1)
CALCIUM SERPL-MCNC: 9.4 MG/DL (ref 8.5–10.1)
CALCULATED P AXIS, ECG09: 57 DEGREES
CALCULATED R AXIS, ECG10: 25 DEGREES
CALCULATED T AXIS, ECG11: -13 DEGREES
CHLORIDE SERPL-SCNC: 103 MMOL/L (ref 100–108)
CHLORIDE SERPL-SCNC: 97 MMOL/L (ref 100–108)
CK MB CFR SERPL CALC: NORMAL % (ref 0–4)
CK MB SERPL-MCNC: <1 NG/ML (ref 5–25)
CK SERPL-CCNC: 147 U/L (ref 26–192)
CO2 SERPL-SCNC: 28 MMOL/L (ref 21–32)
CO2 SERPL-SCNC: 30 MMOL/L (ref 21–32)
COLOR UR: YELLOW
CREAT SERPL-MCNC: 1.25 MG/DL (ref 0.6–1.3)
CREAT SERPL-MCNC: 1.48 MG/DL (ref 0.6–1.3)
DIAGNOSIS, 93000: NORMAL
DIFFERENTIAL METHOD BLD: ABNORMAL
EOSINOPHIL # BLD: 0 K/UL (ref 0–0.4)
EOSINOPHIL NFR BLD: 0 % (ref 0–5)
ERYTHROCYTE [DISTWIDTH] IN BLOOD BY AUTOMATED COUNT: 15.5 % (ref 11.6–14.5)
ETHANOL SERPL-MCNC: <3 MG/DL (ref 0–3)
GLUCOSE BLD STRIP.AUTO-MCNC: 270 MG/DL (ref 70–110)
GLUCOSE SERPL-MCNC: 206 MG/DL (ref 74–99)
GLUCOSE SERPL-MCNC: 234 MG/DL (ref 74–99)
GLUCOSE UR STRIP.AUTO-MCNC: NEGATIVE MG/DL
HCG UR QL: NEGATIVE
HCT VFR BLD AUTO: 33.8 % (ref 35–45)
HGB BLD-MCNC: 10.5 G/DL (ref 12–16)
HGB UR QL STRIP: NEGATIVE
KETONES UR QL STRIP.AUTO: NEGATIVE MG/DL
LEUKOCYTE ESTERASE UR QL STRIP.AUTO: NEGATIVE
LYMPHOCYTES # BLD: 1.2 K/UL (ref 0.9–3.6)
LYMPHOCYTES NFR BLD: 19 % (ref 21–52)
MCH RBC QN AUTO: 21.6 PG (ref 24–34)
MCHC RBC AUTO-ENTMCNC: 31.1 G/DL (ref 31–37)
MCV RBC AUTO: 69.5 FL (ref 74–97)
MONOCYTES # BLD: 0.7 K/UL (ref 0.05–1.2)
MONOCYTES NFR BLD: 10 % (ref 3–10)
NEUTS SEG # BLD: 4.6 K/UL (ref 1.8–8)
NEUTS SEG NFR BLD: 71 % (ref 40–73)
NITRITE UR QL STRIP.AUTO: NEGATIVE
P-R INTERVAL, ECG05: 146 MS
PH UR STRIP: 5.5 [PH] (ref 5–8)
PLATELET # BLD AUTO: 255 K/UL (ref 135–420)
PMV BLD AUTO: 11.2 FL (ref 9.2–11.8)
POTASSIUM SERPL-SCNC: 3.3 MMOL/L (ref 3.5–5.5)
POTASSIUM SERPL-SCNC: 3.6 MMOL/L (ref 3.5–5.5)
PROT UR STRIP-MCNC: NEGATIVE MG/DL
Q-T INTERVAL, ECG07: 388 MS
QRS DURATION, ECG06: 84 MS
QTC CALCULATION (BEZET), ECG08: 472 MS
RBC # BLD AUTO: 4.86 M/UL (ref 4.2–5.3)
SODIUM SERPL-SCNC: 136 MMOL/L (ref 136–145)
SODIUM SERPL-SCNC: 141 MMOL/L (ref 136–145)
SP GR UR REFRACTOMETRY: 1.01 (ref 1–1.03)
TROPONIN I SERPL-MCNC: <0.02 NG/ML (ref 0–0.06)
UROBILINOGEN UR QL STRIP.AUTO: 0.2 EU/DL (ref 0.2–1)
VENTRICULAR RATE, ECG03: 89 BPM
WBC # BLD AUTO: 6.5 K/UL (ref 4.6–13.2)

## 2018-10-14 PROCEDURE — 96361 HYDRATE IV INFUSION ADD-ON: CPT

## 2018-10-14 PROCEDURE — 99285 EMERGENCY DEPT VISIT HI MDM: CPT

## 2018-10-14 PROCEDURE — 96360 HYDRATION IV INFUSION INIT: CPT

## 2018-10-14 PROCEDURE — 81003 URINALYSIS AUTO W/O SCOPE: CPT | Performed by: EMERGENCY MEDICINE

## 2018-10-14 PROCEDURE — 80307 DRUG TEST PRSMV CHEM ANLYZR: CPT | Performed by: EMERGENCY MEDICINE

## 2018-10-14 PROCEDURE — 82962 GLUCOSE BLOOD TEST: CPT

## 2018-10-14 PROCEDURE — 82550 ASSAY OF CK (CPK): CPT | Performed by: EMERGENCY MEDICINE

## 2018-10-14 PROCEDURE — 93005 ELECTROCARDIOGRAM TRACING: CPT

## 2018-10-14 PROCEDURE — 74011250636 HC RX REV CODE- 250/636: Performed by: EMERGENCY MEDICINE

## 2018-10-14 PROCEDURE — 80048 BASIC METABOLIC PNL TOTAL CA: CPT | Performed by: EMERGENCY MEDICINE

## 2018-10-14 PROCEDURE — 85025 COMPLETE CBC W/AUTO DIFF WBC: CPT | Performed by: EMERGENCY MEDICINE

## 2018-10-14 PROCEDURE — 81025 URINE PREGNANCY TEST: CPT | Performed by: EMERGENCY MEDICINE

## 2018-10-14 RX ADMIN — SODIUM CHLORIDE 1000 ML: 900 INJECTION, SOLUTION INTRAVENOUS at 03:51

## 2018-10-14 RX ADMIN — SODIUM CHLORIDE 1000 ML: 900 INJECTION, SOLUTION INTRAVENOUS at 03:52

## 2018-10-14 NOTE — ED PROVIDER NOTES
EMERGENCY DEPARTMENT HISTORY AND PHYSICAL EXAM 
 
7:17 AM 
 
 
Date: 10/14/2018 Patient Name: Richard Mcpherson History of Presenting Illness Chief Complaint Patient presents with  Vomiting  Dizziness  Excessive Sweating History Provided By: Patient Chief Complaint: Nausea Duration:  today Timing:  Constant Location: N/A Quality: N/A Severity: Moderate Modifying Factors: None. Associated Symptoms: diaphoresis and dizziness Additional History (Context): Richard Mcpherson is a 52 y.o. female with HLD and DM who presents to the ED with c/o  Moderate, constant nausea onset today. Pt notes she had a little bit to drink today and that she began to feel nauseated and flush soon after. States she has had previous episodes with sx consistent to her current sx. Pt reports she feels like her sugar may be low. Associated sx include diaphoresis and dizziness. Denies any fever, chills, CP, SOB, abdominal pain, vomiting, diarrhea and any urinary sx. No other sx or complaints at this time. PCP: Ezio Upton MD 
 
Current Outpatient Prescriptions Medication Sig Dispense Refill  KLOR-CON M20 20 mEq tablet TAKE ONE TABLET BY MOUTH ONCE DAILY 90 Tab 0  pioglitazone (ACTOS) 15 mg tablet Take 1 Tab by mouth daily. 90 Tab 1  
 budesonide-formoterol (SYMBICORT) 160-4.5 mcg/actuation HFAA Take 2 Puffs by inhalation two (2) times a day. 1 Inhaler 0  
 amLODIPine (NORVASC) 10 mg tablet TAKE 1 TABLET BY MOUTH ONCE DAILY *CHANGE IN STRENGTH* 90 Tab 0  chlorthalidone (HYGROTEN) 25 mg tablet Take 1 Tab by mouth daily. 90 Tab 1  
 metFORMIN ER (GLUCOPHAGE XR) 500 mg tablet TAKE TWO TABLETS BY MOUTH ONCE DAILY WITH  DINNER 180 Tab 0  
 BYDUREON 2 mg serr INJECT ONE SYRINGE SUBCUTANEOUSLY ONCE A WEEK (EVERY  7  DAYS) 4 Each 11  
 triamterene-hydroCHLOROthiazide (DYAZIDE) 37.5-25 mg per capsule Take 1 Cap by mouth daily.  90 Cap 3  
  repaglinide (PRANDIN) 2 mg tablet Take 1 Tab by mouth Before breakfast, lunch, and dinner. 90 Tab 11  
 losartan (COZAAR) 100 mg tablet TAKE ONE TABLET BY MOUTH ONCE DAILY 90 Tab 3  
 atorvastatin (LIPITOR) 20 mg tablet TAKE ONE TABLET BY MOUTH ONCE DAILY 90 Tab 3  
 citalopram (CELEXA) 20 mg tablet TAKE ONE TABLET BY MOUTH ONCE DAILY 90 Tab 3  
 ergocalciferol (ERGOCALCIFEROL) 50,000 unit capsule Take 1 Cap by mouth every seven (7) days. 13 Cap 3  Blood-Glucose Meter monitoring kit Pt checks blood sugar once daily, Dx E11.9 1 Kit 0  
 Lancets misc Pt checks blood sugar once daily, Dx E11.9 100 Each 3  
 glucose blood VI test strips (BLOOD GLUCOSE TEST) strip Pt checks blood sugar once daily, Dx e11.9 100 Strip 3  cloNIDine HCl (CATAPRES) 0.1 mg tablet Take 1 Tab by mouth two (2) times a day. 90 Tab sched ov bm or sl p  
 metFORMIN ER (GLUCOPHAGE XR) 500 mg tablet Take 2 Tabs by mouth daily (with dinner). 180 Tab 0  
 KLOR-CON M20 20 mEq tablet TAKE ONE TABLET BY MOUTH ONCE DAILY 90 Tab 0  
 fluconazole (DIFLUCAN) 150 mg tablet TAKE 1 TABLET BY MOUTH DAILY FOR 1 DAY 1 Tab 1  
 guaiFENesin-codeine (CHERATUSSIN AC) 100-10 mg/5 mL solution Take 10 mL by mouth three (3) times daily as needed for Cough. Max Daily Amount: 30 mL. 118 mL 0  
 albuterol (PROVENTIL HFA, VENTOLIN HFA, PROAIR HFA) 90 mcg/actuation inhaler Take 1-2 Puffs by inhalation every four (4) hours as needed for Wheezing. 1 Inhaler 11  
 predniSONE (DELTASONE) 20 mg tablet Take 3 tabs by mouth daily x 3 days, then 2 tabs daily x 3 days, then 1 tab daily x 3 days. 18 Tab 0  pioglitazone (ACTOS) 15 mg tablet 1 daily 30 Tab 5  cyanocobalamin 1,000 mcg tablet Take 1,000 mcg by mouth daily.  nystatin-triamcinolone (MYCOLOG II) topical cream Apply  to affected area two (2) times a day. 15 g 0  
 aspirin 81 mg chewable tablet Take 81 mg by mouth daily. Past History Past Medical History: 
Past Medical History: Diagnosis Date  Acanthosis nigricans   Anxiety  Asthma  Depression  Diabetes mellitus ([facility] [address]  Heavy menses 3/12  
 s/p endometrial ablation 3/16  History of CVA (cerebrovascular accident)   
 no deficits  History of iron deficiency anemia  Hyperlipidemia  Hypertension  Hypovitaminosis D 10/11  Morbid obesity ([facility] [address] peak weight 215 lbs, bmi 38.1 from 6/15; delcined dharmesh suppressants, med suprervised wt loss, bariatrics; IF 3/18  JOLIE (obstructive sleep apnea) Dr. Mary Mann cpap  Perennial allergic rhinitis Past Surgical History: 
Past Surgical History:  
Procedure Laterality Date  HX  SECTION    
 x2  
 HX COLONOSCOPY Dr Cox Query 6/10 negative  HX GI  6/10  
 neg EGD Dr Cox Query  HX GYN    
 cyst removal  
 HX GYN    
 LEEP  
 HX HYSTEROSCOPY WITH ENDOMETRIAL ABLATION  3/16 Dr Marybeth Saleem Family History: 
Family History Problem Relation Age of Onset  Diabetes Mother Social History: 
Social History Substance Use Topics  Smoking status: Never Smoker  Smokeless tobacco: Never Used  Alcohol use Yes Comment: social drinker Allergies: Allergies Allergen Reactions  Penicillins Unknown (comments) Tongue swelling; difficulty swallowing; thrush  Compazine [Prochlorperazine] Unknown (comments) Paranoia  Invokana [Canagliflozin] Other (comments) Yeast infections Review of Systems Review of Systems Constitutional: Positive for diaphoresis. Negative for activity change, fatigue and fever. HENT: Negative for congestion and rhinorrhea. Eyes: Negative for visual disturbance. Respiratory: Negative for shortness of breath. Cardiovascular: Negative for chest pain and palpitations. Gastrointestinal: Positive for nausea. Negative for abdominal pain, diarrhea and vomiting. Genitourinary: Negative for dysuria and hematuria. Musculoskeletal: Negative for back pain. Skin: Negative for rash. Neurological: Positive for dizziness. Negative for weakness and light-headedness. All other systems reviewed and are negative. Physical Exam  
 
Visit Vitals  /78  Pulse 84  Temp 98.7 °F (37.1 °C)  Resp 18  Wt 96.2 kg (212 lb)  SpO2 100%  BMI 35.28 kg/m2 Physical Exam  
Constitutional: She is oriented to person, place, and time. She appears well-developed and well-nourished. No distress. HENT:  
Head: Normocephalic. Right Ear: External ear normal.  
Left Ear: External ear normal.  
Mouth/Throat: No oropharyngeal exudate. Eyes: Conjunctivae and EOM are normal. Pupils are equal, round, and reactive to light. Right eye exhibits no discharge. Left eye exhibits no discharge. No scleral icterus. Neck: Normal range of motion. Neck supple. No JVD present. No tracheal deviation present. No thyromegaly present. Cardiovascular: Normal rate, regular rhythm, normal heart sounds and intact distal pulses. Exam reveals no gallop and no friction rub. No murmur heard. Pulmonary/Chest: Effort normal and breath sounds normal. No stridor. No respiratory distress. She has no wheezes. She has no rales. She exhibits no tenderness. Abdominal: Soft. Bowel sounds are normal. She exhibits no distension and no mass. There is no tenderness. There is no rebound and no guarding. Musculoskeletal: Normal range of motion. She exhibits no edema or tenderness. Lymphadenopathy:  
  She has no cervical adenopathy. Neurological: She is alert and oriented to person, place, and time. She displays normal reflexes. No cranial nerve deficit. She exhibits normal muscle tone. Coordination normal.  
Skin: Skin is warm and dry. No rash noted. She is not diaphoretic. No erythema. No pallor. Nursing note and vitals reviewed. Diagnostic Study Results Labs - Recent Results (from the past 12 hour(s)) GLUCOSE, POC  
 Collection Time: 10/14/18  2:33 AM  
Result Value Ref Range Glucose (POC) 270 (H) 70 - 110 mg/dL CBC WITH AUTOMATED DIFF Collection Time: 10/14/18  2:45 AM  
Result Value Ref Range WBC 6.5 4.6 - 13.2 K/uL  
 RBC 4.86 4.20 - 5.30 M/uL  
 HGB 10.5 (L) 12.0 - 16.0 g/dL HCT 33.8 (L) 35.0 - 45.0 % MCV 69.5 (L) 74.0 - 97.0 FL  
 MCH 21.6 (L) 24.0 - 34.0 PG  
 MCHC 31.1 31.0 - 37.0 g/dL  
 RDW 15.5 (H) 11.6 - 14.5 % PLATELET 175 744 - 093 K/uL MPV 11.2 9.2 - 11.8 FL  
 NEUTROPHILS 71 40 - 73 % LYMPHOCYTES 19 (L) 21 - 52 % MONOCYTES 10 3 - 10 % EOSINOPHILS 0 0 - 5 % BASOPHILS 0 0 - 2 %  
 ABS. NEUTROPHILS 4.6 1.8 - 8.0 K/UL  
 ABS. LYMPHOCYTES 1.2 0.9 - 3.6 K/UL  
 ABS. MONOCYTES 0.7 0.05 - 1.2 K/UL  
 ABS. EOSINOPHILS 0.0 0.0 - 0.4 K/UL  
 ABS. BASOPHILS 0.0 0.0 - 0.1 K/UL  
 DF AUTOMATED METABOLIC PANEL, BASIC Collection Time: 10/14/18  2:45 AM  
Result Value Ref Range Sodium 136 136 - 145 mmol/L Potassium 3.6 3.5 - 5.5 mmol/L Chloride 97 (L) 100 - 108 mmol/L  
 CO2 28 21 - 32 mmol/L Anion gap 11 3.0 - 18 mmol/L Glucose 234 (H) 74 - 99 mg/dL BUN 26 (H) 7.0 - 18 MG/DL Creatinine 1.48 (H) 0.6 - 1.3 MG/DL  
 BUN/Creatinine ratio 18 12 - 20 GFR est AA 46 (L) >60 ml/min/1.73m2 GFR est non-AA 38 (L) >60 ml/min/1.73m2 Calcium 9.4 8.5 - 10.1 MG/DL  
CARDIAC PANEL,(CK, CKMB & TROPONIN) Collection Time: 10/14/18  2:45 AM  
Result Value Ref Range  26 - 192 U/L  
 CK - MB <1.0 <3.6 ng/ml CK-MB Index  0.0 - 4.0 % CALCULATION NOT PERFORMED WHEN RESULT IS BELOW LINEAR LIMIT Troponin-I, Qt. <0.02 0.00 - 0.06 NG/ML  
ETHYL ALCOHOL Collection Time: 10/14/18  2:45 AM  
Result Value Ref Range ALCOHOL(ETHYL),SERUM <3 0 - 3 MG/DL  
EKG, 12 LEAD, INITIAL Collection Time: 10/14/18  2:50 AM  
Result Value Ref Range Ventricular Rate 89 BPM  
 Atrial Rate 89 BPM  
 P-R Interval 146 ms  
 QRS Duration 84 ms Q-T Interval 388 ms QTC Calculation (Bezet) 472 ms Calculated P Axis 57 degrees Calculated R Axis 25 degrees Calculated T Axis -13 degrees Diagnosis Normal sinus rhythm Nonspecific T wave abnormality Prolonged QT Abnormal ECG When compared with ECG of 31-MAY-2016 15:44, No significant change was found URINALYSIS W/ RFLX MICROSCOPIC Collection Time: 10/14/18  3:03 AM  
Result Value Ref Range Color YELLOW Appearance CLEAR Specific gravity 1.009 1.005 - 1.030    
 pH (UA) 5.5 5.0 - 8.0 Protein NEGATIVE  NEG mg/dL Glucose NEGATIVE  NEG mg/dL Ketone NEGATIVE  NEG mg/dL Bilirubin NEGATIVE  NEG Blood NEGATIVE  NEG Urobilinogen 0.2 0.2 - 1.0 EU/dL Nitrites NEGATIVE  NEG Leukocyte Esterase NEGATIVE  NEG    
HCG URINE, QL Collection Time: 10/14/18  3:03 AM  
Result Value Ref Range HCG urine, QL NEGATIVE  NEG    
METABOLIC PANEL, BASIC Collection Time: 10/14/18  6:50 AM  
Result Value Ref Range Sodium 141 136 - 145 mmol/L Potassium 3.3 (L) 3.5 - 5.5 mmol/L Chloride 103 100 - 108 mmol/L  
 CO2 30 21 - 32 mmol/L Anion gap 8 3.0 - 18 mmol/L Glucose 206 (H) 74 - 99 mg/dL BUN 22 (H) 7.0 - 18 MG/DL Creatinine 1.25 0.6 - 1.3 MG/DL  
 BUN/Creatinine ratio 18 12 - 20 GFR est AA 56 (L) >60 ml/min/1.73m2 GFR est non-AA 46 (L) >60 ml/min/1.73m2 Calcium 8.8 8.5 - 10.1 MG/DL Radiologic Studies - No orders to display Medical Decision Making I am the first provider for this patient. I reviewed the vital signs, available nursing notes, past medical history, past surgical history, family history and social history. Vital Signs-Reviewed the patient's vital signs. Pulse Oximetry Analysis -  100% on room air (Normal) Cardiac Monitor: 
Rate: 84 Rhythm:  Normal Sinus Rhythm Records Reviewed: Nursing Notes (Time of Review: 7:17 AM) ED Course: Progress Notes, Reevaluation, and Consults: 7:18 AM: I hydrated the pt, she is feeling much better. Her glucose level is elevated, I treated that. I will discharge the pt pending her lab results. Provider Notes (Medical Decision Making): viral syndrome, GERD, Flu, dehydration, food poisoning and etc. 
 
 
Diagnosis Clinical Impression: 1. Nausea without vomiting Disposition: Home. Follow-up Information None Patient's Medications Start Taking No medications on file Continue Taking ALBUTEROL (PROVENTIL HFA, VENTOLIN HFA, PROAIR HFA) 90 MCG/ACTUATION INHALER    Take 1-2 Puffs by inhalation every four (4) hours as needed for Wheezing. AMLODIPINE (NORVASC) 10 MG TABLET    TAKE 1 TABLET BY MOUTH ONCE DAILY *CHANGE IN STRENGTH* ASPIRIN 81 MG CHEWABLE TABLET    Take 81 mg by mouth daily. ATORVASTATIN (LIPITOR) 20 MG TABLET    TAKE ONE TABLET BY MOUTH ONCE DAILY  
 BLOOD-GLUCOSE METER MONITORING KIT    Pt checks blood sugar once daily, Dx E11.9 BUDESONIDE-FORMOTEROL (SYMBICORT) 160-4.5 MCG/ACTUATION HFAA    Take 2 Puffs by inhalation two (2) times a day. BYDUREON 2 MG SERR    INJECT ONE SYRINGE SUBCUTANEOUSLY ONCE A WEEK (EVERY  7  DAYS) CHLORTHALIDONE (HYGROTEN) 25 MG TABLET    Take 1 Tab by mouth daily. CITALOPRAM (CELEXA) 20 MG TABLET    TAKE ONE TABLET BY MOUTH ONCE DAILY CLONIDINE HCL (CATAPRES) 0.1 MG TABLET    Take 1 Tab by mouth two (2) times a day. CYANOCOBALAMIN 1,000 MCG TABLET    Take 1,000 mcg by mouth daily. ERGOCALCIFEROL (ERGOCALCIFEROL) 50,000 UNIT CAPSULE    Take 1 Cap by mouth every seven (7) days. FLUCONAZOLE (DIFLUCAN) 150 MG TABLET    TAKE 1 TABLET BY MOUTH DAILY FOR 1 DAY  
 GLUCOSE BLOOD VI TEST STRIPS (BLOOD GLUCOSE TEST) STRIP    Pt checks blood sugar once daily, Dx e11.9 GUAIFENESIN-CODEINE (CHERATUSSIN AC) 100-10 MG/5 ML SOLUTION    Take 10 mL by mouth three (3) times daily as needed for Cough. Max Daily Amount: 30 mL. KLOR-CON M20 20 MEQ TABLET    TAKE ONE TABLET BY MOUTH ONCE DAILY  
 KLOR-CON M20 20 MEQ TABLET    TAKE ONE TABLET BY MOUTH ONCE DAILY LANCETS [facility]    Pt checks blood sugar once daily, Dx E11.9 LOSARTAN (COZAAR) 100 MG TABLET    TAKE ONE TABLET BY MOUTH ONCE DAILY METFORMIN ER (GLUCOPHAGE XR) 500 MG TABLET    Take 2 Tabs by mouth daily (with dinner). METFORMIN ER (GLUCOPHAGE XR) 500 MG TABLET    TAKE TWO TABLETS BY MOUTH ONCE DAILY WITH  DINNER  
 NYSTATIN-TRIAMCINOLONE (MYCOLOG II) TOPICAL CREAM    Apply  to affected area two (2) times a day. PIOGLITAZONE (ACTOS) 15 MG TABLET    1 daily PIOGLITAZONE (ACTOS) 15 MG TABLET    Take 1 Tab by mouth daily. PREDNISONE (DELTASONE) 20 MG TABLET    Take 3 tabs by mouth daily x 3 days, then 2 tabs daily x 3 days, then 1 tab daily x 3 days. REPAGLINIDE (PRANDIN) 2 MG TABLET    Take 1 Tab by mouth Before breakfast, lunch, and dinner. TRIAMTERENE-HYDROCHLOROTHIAZIDE (DYAZIDE) 37.5-25 MG PER CAPSULE    Take 1 Cap by mouth daily. These Medications have changed No medications on file Stop Taking No medications on file  
 
_______________________________ Attestations: 
Scribe Attestation Aaliyah Cosme acting as a scribe for and in the presence of Theophilus Fabry, MD. October 14, 2018 at 7:17 AM 
    
Provider Attestation:     
I personally performed the services described in the documentation, reviewed the documentation, as recorded by the scribe in my presence, and it accurately and completely records my words and actions. October 14, 2018 at 7:17 AM - Theophilus Fabry, MD.   
_______________________________

## 2018-10-14 NOTE — ED NOTES
Pt. Reassessed at this time and has no new complaints, pt is resting in bed in the POC. Pt has no complaints of pain or dizziness at this time. MD notified and will continue to monitor.

## 2018-10-14 NOTE — LETTER
[address] [place] [address] Dr [facility]LLES EMERGENCY DEPT 
[address] [facility] [address] 
[phone] Work/School Note Date: 10/14/2018 To Whom It May concern: 
 
Robbie Huerta was seen and treated today in the emergency room by the following provider(s): 
No providers found. Robbie uHerta may return to work on 10/16/2018. Sincerely, 
 
 
 
 
Laurie Nobles

## 2018-10-14 NOTE — ED NOTES
Pt's labs are back at this time, MD notified and he stated she was good to be Discharged at this time. Will discharge upon completion of paper work.

## 2018-10-14 NOTE — ED TRIAGE NOTES
Patient c/o \"think my sugar is low because I got lightheaded and felt like I was gonna faint, got sweaty, and shaky\"

## 2018-10-14 NOTE — ED NOTES
Pt. Reassessed at this time and was assisted to the bathroom at this time. MD notified and will continue to monitor. Pt has no new complaints.

## 2018-10-14 NOTE — ED NOTES
Pt. States she has been feeling dizzy most of the day, then went out tonight to have a drink for fun. Pt states she thinks she has a low blood sugar because she has also been sweating a lot. MD notified and will continue to monitor.

## 2018-10-14 NOTE — ED NOTES
Pt. Reassessed at this time and is resting in bed in the POC, she has no complaints of dizziness and is requesting to use the bathroom again. Pt ambulated to the bathroom without assistance and no complications. MD notified. Labs were redrawn at this time. Will continue to monitor.

## 2018-10-14 NOTE — DISCHARGE INSTRUCTIONS
Nausea and Vomiting: Care Instructions  Your Care Instructions    When you are nauseated, you may feel weak and sweaty and notice a lot of saliva in your mouth. Nausea often leads to vomiting. Most of the time you do not need to worry about nausea and vomiting, but they can be signs of other illnesses. Two common causes of nausea and vomiting are stomach flu and food poisoning. Nausea and vomiting from viral stomach flu will usually start to improve within 24 hours. Nausea and vomiting from food poisoning may last from 12 to 48 hours. The doctor has checked you carefully, but problems can develop later. If you notice any problems or new symptoms, get medical treatment right away. Follow-up care is a key part of your treatment and safety. Be sure to make and go to all appointments, and call your doctor if you are having problems. It's also a good idea to know your test results and keep a list of the medicines you take. How can you care for yourself at home? · To prevent dehydration, drink plenty of fluids, enough so that your urine is light yellow or clear like water. Choose water and other caffeine-free clear liquids until you feel better. If you have kidney, heart, or liver disease and have to limit fluids, talk with your doctor before you increase the amount of fluids you drink. · Rest in bed until you feel better. · When you are able to eat, try clear soups, mild foods, and liquids until all symptoms are gone for 12 to 48 hours. Other good choices include dry toast, crackers, cooked cereal, and gelatin dessert, such as Jell-O. When should you call for help? Call 911 anytime you think you may need emergency care. For example, call if:    · You passed out (lost consciousness).    Call your doctor now or seek immediate medical care if:    · You have symptoms of dehydration, such as:  ¨ Dry eyes and a dry mouth. ¨ Passing only a little dark urine.   ¨ Feeling thirstier than usual.     · You have new or worsening belly pain.     · You have a new or higher fever.     · You vomit blood or what looks like coffee grounds.    Watch closely for changes in your health, and be sure to contact your doctor if:    · You have ongoing nausea and vomiting.     · Your vomiting is getting worse.     · Your vomiting lasts longer than 2 days.     · You are not getting better as expected. Where can you learn more? Go to http://thompson-alexandre.info/. Enter [phone] [id number] in the search box to learn more about \"Nausea and Vomiting: Care Instructions. \"  Current as of: November 20, 2017  Content Version: 11.8  © 0155-0043 "[employer] Care instructions adapted under license by [employer] (which disclaims liability or warranty for this information). If you have questions about a medical condition or this instruction, always ask your healthcare professional. [address] any warranty or liability for your use of this information.

## 2018-10-15 NOTE — TELEPHONE ENCOUNTER
i'm not exactly sure how to answer these questions since I'm not sure why she's asking these accommodations - need a medical reason to put into the form which I can't find in the chart    She has ov wed - we can discuss then

## 2018-10-17 ENCOUNTER — OFFICE VISIT (OUTPATIENT)
Dept: INTERNAL MEDICINE CLINIC | Age: 48
End: 2018-10-17

## 2018-10-17 VITALS
RESPIRATION RATE: 16 BRPM | SYSTOLIC BLOOD PRESSURE: 148 MMHG | HEIGHT: 65 IN | TEMPERATURE: 99.1 F | BODY MASS INDEX: 37.32 KG/M2 | WEIGHT: 224 LBS | HEART RATE: 83 BPM | OXYGEN SATURATION: 99 % | DIASTOLIC BLOOD PRESSURE: 92 MMHG

## 2018-10-17 DIAGNOSIS — E86.0 DEHYDRATION: ICD-10-CM

## 2018-10-17 DIAGNOSIS — D64.9 CHRONIC ANEMIA: ICD-10-CM

## 2018-10-17 DIAGNOSIS — G47.33 OSA (OBSTRUCTIVE SLEEP APNEA): Primary | ICD-10-CM

## 2018-10-17 RX ORDER — FLUCONAZOLE 150 MG/1
150 TABLET ORAL DAILY
Qty: 1 TAB | Refills: 1 | Status: SHIPPED | OUTPATIENT
Start: 2018-10-17 | End: 2018-12-14 | Stop reason: SDUPTHER

## 2018-10-17 NOTE — LETTER
NOTIFICATION RETURN TO WORK / SCHOOL 
 
10/17/2018 12:11 PM 
 
Ms. Tristen Goldstein [address] [place] [address],[address] [facility] [address] To Whom It May Concern: 
 
Tristen Goldstein is currently under the care of Kelley Denis. She will return to work/school on: 10/18/2018 If there are questions or concerns please have the patient contact our office. Sincerely, Phoebe De La Cruz MD

## 2018-10-17 NOTE — PROGRESS NOTES
52 y.o. BLACK OR  female who presents for evaluation. She is here to follow-up after her ER visit from 10/1/18. This is apparently the second time she has had similar episode. This most recent one, she was going all day long, initially to a birthday party, then a wedding and then a concert. While at a concert, she apparently had some mixed drinks and soon thereafter felt ill with severe nausea and was feeling flushed. She ended up in the ER, was noted to be probably dehydrated. She did receive treatment and was better. She is convinced that it was all due to just not behaving properly that day. She feels bad about herself. Under a lot of stress with work. We talked about intermittent fasting at the last visit and she was able to do it for a few weeks and did note some weight loss. Was frustrating her as her  is doing it and is losing a lot of weight. She just cannot get herself to stick with the diet. She stopped going to the gym, previously going an hour a day. No cardiovascular, GI,  complaints otherwise. Dr. Elma Orellana told her she has thalassemia and did not end up getting any iron infusion. Past Medical History:  
Diagnosis Date  Acanthosis nigricans   Anxiety  Asthma  Chronic anemia Dr Elma Orellana; thalassemia  Depression  Diabetes mellitus ([facility] [address] [facility]  Heavy menses 3/12  
 s/p endometrial ablation 3/16  History of CVA (cerebrovascular accident)   
 no deficits  History of iron deficiency anemia  Hyperlipidemia  Hypertension  Hypovitaminosis D 10/11  Morbid obesity ([facility] [address] peak weight 215 lbs, bmi 38.1 from 6/15; delcined dharmesh suppressants, med suprervised wt loss, bariatrics; IF 3/18  JOLIE (obstructive sleep apnea) Dr. Mike Pappas cpap  Perennial allergic rhinitis Past Surgical History:  
Procedure Laterality Date  HX  SECTION    
 x2  
 HX COLONOSCOPY Dr Elma Orellana 6/10 negative  HX GI  6/10  
 neg EGD Dr Cy Dyson  HX GYN    
 cyst removal  
 HX GYN    
 LEEP  
 HX HYSTEROSCOPY WITH ENDOMETRIAL ABLATION  3/16 Dr Mela Frank Social History Socioeconomic History  Marital status:  Spouse name: Not on file  Number of children: 2  
 Years of education: Not on file  Highest education level: Not on file Social Needs  Financial resource strain: Not on file  Food insecurity - worry: Not on file  Food insecurity - inability: Not on file  Transportation needs - medical: Not on file  Transportation needs - non-medical: Not on file Occupational History  Occupation: works at [employer] Tobacco Use  Smoking status: Never Smoker  Smokeless tobacco: Never Used Substance and Sexual Activity  Alcohol use: Yes Comment: social drinker  Drug use: No  
 Sexual activity: Not on file Other Topics Concern  Not on file Social History Narrative  Not on file Current Outpatient Medications Medication Sig  
 fluconazole (DIFLUCAN) 150 mg tablet Take 1 Tab by mouth daily. FDA advises cautious prescribing of oral fluconazole in pregnancy.  budesonide-formoterol (SYMBICORT) 160-4.5 mcg/actuation HFAA Take 2 Puffs by inhalation two (2) times a day.  amLODIPine (NORVASC) 10 mg tablet TAKE 1 TABLET BY MOUTH ONCE DAILY *CHANGE IN STRENGTH*  chlorthalidone (HYGROTEN) 25 mg tablet Take 1 Tab by mouth daily.  BYDUREON 2 mg serr INJECT ONE SYRINGE SUBCUTANEOUSLY ONCE A WEEK (EVERY  7  DAYS)  repaglinide (PRANDIN) 2 mg tablet Take 1 Tab by mouth Before breakfast, lunch, and dinner.  losartan (COZAAR) 100 mg tablet TAKE ONE TABLET BY MOUTH ONCE DAILY  atorvastatin (LIPITOR) 20 mg tablet TAKE ONE TABLET BY MOUTH ONCE DAILY  citalopram (CELEXA) 20 mg tablet TAKE ONE TABLET BY MOUTH ONCE DAILY  pioglitazone (ACTOS) 15 mg tablet 1 daily  ergocalciferol (ERGOCALCIFEROL) 50,000 unit capsule Take 1 Cap by mouth every seven (7) days.  Blood-Glucose Meter monitoring kit Pt checks blood sugar once daily, Dx E11.9  Lancets misc Pt checks blood sugar once daily, Dx E11.9  
 glucose blood VI test strips (BLOOD GLUCOSE TEST) strip Pt checks blood sugar once daily, Dx e11.9  
 metFORMIN ER (GLUCOPHAGE XR) 500 mg tablet Take 2 Tabs by mouth daily (with dinner).  aspirin 81 mg chewable tablet Take 81 mg by mouth daily.  KLOR-CON M20 20 mEq tablet TAKE ONE TABLET BY MOUTH ONCE DAILY  guaiFENesin-codeine (CHERATUSSIN AC) 100-10 mg/5 mL solution Take 10 mL by mouth three (3) times daily as needed for Cough. Max Daily Amount: 30 mL. (Patient taking differently: Take 10 mL by mouth three (3) times daily as needed for Cough.)  albuterol (PROVENTIL HFA, VENTOLIN HFA, PROAIR HFA) 90 mcg/actuation inhaler Take 1-2 Puffs by inhalation every four (4) hours as needed for Wheezing.  triamterene-hydroCHLOROthiazide (DYAZIDE) 37.5-25 mg per capsule Take 1 Cap by mouth daily. (Patient taking differently: Take 1 Cap by mouth daily.)  cloNIDine HCl (CATAPRES) 0.1 mg tablet Take 1 Tab by mouth two (2) times a day. (Patient taking differently: Take 0.1 mg by mouth two (2) times a day.)  cyanocobalamin 1,000 mcg tablet Take 1,000 mcg by mouth daily.  nystatin-triamcinolone (MYCOLOG II) topical cream Apply  to affected area two (2) times a day. (Patient taking differently: Apply  to affected area two (2) times a day.) No current facility-administered medications for this visit. Allergies Allergen Reactions  Penicillins Unknown (comments) Tongue swelling; difficulty swallowing; thrush  Compazine [Prochlorperazine] Unknown (comments) Paranoia  Invokana [Canagliflozin] Other (comments) Yeast infections Visit Vitals BP (!) 148/92 Pulse 83 Temp 99.1 °F (37.3 °C) Resp 16 Ht 5' 5\" (1.651 m) Wt 224 lb (101.6 kg) SpO2 99% BMI 37.28 kg/m²  
she has not taken her bp meds yet today A&O x3 Affect is appropriate. Mood stable No apparent distress Anicteric, no JVD, adenopathy or thyromegaly. No carotid bruits or radiated murmur Lungs clear to auscultation, no wheezes or rales Heart showed regular rate and rhythm. No murmur, rubs, gallops Abdomen soft nontender, no hepatosplenomegaly or masses. Extremities without edema. Pulses 1-2+ symmetrically Results for orders placed or performed during the hospital encounter of 10/14/18 CBC WITH AUTOMATED DIFF Result Value Ref Range WBC 6.5 4.6 - 13.2 K/uL  
 RBC 4.86 4.20 - 5.30 M/uL  
 HGB 10.5 (L) 12.0 - 16.0 g/dL HCT 33.8 (L) 35.0 - 45.0 % MCV 69.5 (L) 74.0 - 97.0 FL  
 MCH 21.6 (L) 24.0 - 34.0 PG  
 MCHC 31.1 31.0 - 37.0 g/dL  
 RDW 15.5 (H) 11.6 - 14.5 % PLATELET 414 891 - 796 K/uL MPV 11.2 9.2 - 11.8 FL  
 NEUTROPHILS 71 40 - 73 % LYMPHOCYTES 19 (L) 21 - 52 % MONOCYTES 10 3 - 10 % EOSINOPHILS 0 0 - 5 % BASOPHILS 0 0 - 2 %  
 ABS. NEUTROPHILS 4.6 1.8 - 8.0 K/UL  
 ABS. LYMPHOCYTES 1.2 0.9 - 3.6 K/UL  
 ABS. MONOCYTES 0.7 0.05 - 1.2 K/UL  
 ABS. EOSINOPHILS 0.0 0.0 - 0.4 K/UL  
 ABS. BASOPHILS 0.0 0.0 - 0.1 K/UL  
 DF AUTOMATED METABOLIC PANEL, BASIC Result Value Ref Range Sodium 136 136 - 145 mmol/L Potassium 3.6 3.5 - 5.5 mmol/L Chloride 97 (L) 100 - 108 mmol/L  
 CO2 28 21 - 32 mmol/L Anion gap 11 3.0 - 18 mmol/L Glucose 234 (H) 74 - 99 mg/dL BUN 26 (H) 7.0 - 18 MG/DL Creatinine 1.48 (H) 0.6 - 1.3 MG/DL  
 BUN/Creatinine ratio 18 12 - 20 GFR est AA 46 (L) >60 ml/min/1.73m2 GFR est non-AA 38 (L) >60 ml/min/1.73m2 Calcium 9.4 8.5 - 10.1 MG/DL  
CARDIAC PANEL,(CK, CKMB & TROPONIN) Result Value Ref Range  26 - 192 U/L  
 CK - MB <1.0 <3.6 ng/ml CK-MB Index  0.0 - 4.0 % CALCULATION NOT PERFORMED WHEN RESULT IS BELOW LINEAR LIMIT Troponin-I, Qt. <0.02 0.00 - 0.06 NG/ML  
ETHYL ALCOHOL Result Value Ref Range  ALCOHOL(ETHYL),SERUM <3 0 - 3 MG/DL  
 URINALYSIS W/ RFLX MICROSCOPIC Result Value Ref Range Color YELLOW Appearance CLEAR Specific gravity 1.009 1.005 - 1.030    
 pH (UA) 5.5 5.0 - 8.0 Protein NEGATIVE  NEG mg/dL Glucose NEGATIVE  NEG mg/dL Ketone NEGATIVE  NEG mg/dL Bilirubin NEGATIVE  NEG Blood NEGATIVE  NEG Urobilinogen 0.2 0.2 - 1.0 EU/dL Nitrites NEGATIVE  NEG Leukocyte Esterase NEGATIVE  NEG    
HCG URINE, QL Result Value Ref Range HCG urine, QL NEGATIVE  NEG    
METABOLIC PANEL, BASIC Result Value Ref Range Sodium 141 136 - 145 mmol/L Potassium 3.3 (L) 3.5 - 5.5 mmol/L Chloride 103 100 - 108 mmol/L  
 CO2 30 21 - 32 mmol/L Anion gap 8 3.0 - 18 mmol/L Glucose 206 (H) 74 - 99 mg/dL BUN 22 (H) 7.0 - 18 MG/DL Creatinine 1.25 0.6 - 1.3 MG/DL  
 BUN/Creatinine ratio 18 12 - 20 GFR est AA 56 (L) >60 ml/min/1.73m2 GFR est non-AA 46 (L) >60 ml/min/1.73m2 Calcium 8.8 8.5 - 10.1 MG/DL  
GLUCOSE, POC Result Value Ref Range Glucose (POC) 270 (H) 70 - 110 mg/dL EKG, 12 LEAD, INITIAL Result Value Ref Range Ventricular Rate 89 BPM  
 Atrial Rate 89 BPM  
 P-R Interval 146 ms  
 QRS Duration 84 ms Q-T Interval 388 ms QTC Calculation (Bezet) 472 ms Calculated P Axis 57 degrees Calculated R Axis 25 degrees Calculated T Axis -13 degrees Diagnosis Normal sinus rhythm Nonspecific T wave abnormality Prolonged QT Abnormal ECG When compared with ECG of 31-MAY-2016 15:44, No significant change was found Confirmed by Akilah Carr MD, [facility] [place] [id number] on 10/14/2018 10:15:20 AM 
  
 
Assessment and plan: 1. Dehydration. Resolved. Work excuse given 2. HTN. Continue current 3. DM. Continue current 4. Obesity. Another long discussion about IF. She is determined to give this another try. Watch for hypoglycemia 5. JOLIE. Also a long discussion. Will send to Dr Oscar Haney for opinion 6. Her work forms were filled out RTC 2/19 Above conditions discussed at length and patient vocalized understanding. All questions answered to patient satisfaction TOTAL time 40 min spent of which at least 30 min was on counseling, answering questions and coordination of care

## 2018-10-17 NOTE — PROGRESS NOTES
1. Have you been to the ER, urgent care clinic or hospitalized since your last visit? YES. 10/14/18 HBV 2. Have you seen or consulted any other health care providers outside of the [address] [place] [address] since your last visit (Include any pap smears or colon screening)? NO Do you have an Advanced Directive? NO Would you like information on Advanced Directives? NO Chief Complaint Patient presents with  
[facility] Follow Up  
  nausea without vomiting

## 2018-11-03 ENCOUNTER — TELEPHONE (OUTPATIENT)
Dept: INTERNAL MEDICINE CLINIC | Age: 48
End: 2018-11-03

## 2018-11-03 NOTE — TELEPHONE ENCOUNTER
Received call from patient. She states that she was prescribed 14 days of clindamycin by her dentist for an infected tooth. She states that she was also started on Flagyl for bacterial vaginosis by her gynecologist. She states that since taking both antibiotics, she has been feeling severely nauseated and having diarrhea. She states that she is having the most severe symptoms after taking the medication doses. Discussed that would recommend holding Flagyl to see if helps. If so, would hold until after completing the course of clindamycin for her tooth infection, then could take for BV. Also advised contacting dentist/gyn if no improvement.

## 2018-11-06 RX ORDER — CHLORTHALIDONE 25 MG/1
TABLET ORAL
Qty: 90 TAB | Refills: 1 | Status: SHIPPED | OUTPATIENT
Start: 2018-11-06 | End: 2019-05-16 | Stop reason: SDUPTHER

## 2018-12-14 DIAGNOSIS — G47.33 OSA (OBSTRUCTIVE SLEEP APNEA): ICD-10-CM

## 2018-12-14 RX ORDER — CITALOPRAM 20 MG/1
TABLET, FILM COATED ORAL
Qty: 90 TAB | Refills: 3 | Status: SHIPPED | OUTPATIENT
Start: 2018-12-14 | End: 2019-12-22

## 2018-12-14 RX ORDER — FLUCONAZOLE 150 MG/1
TABLET ORAL
Qty: 1 TAB | Refills: 1 | Status: SHIPPED | OUTPATIENT
Start: 2018-12-14 | End: 2019-02-25 | Stop reason: SDUPTHER

## 2018-12-18 ENCOUNTER — TELEPHONE (OUTPATIENT)
Dept: INTERNAL MEDICINE CLINIC | Age: 48
End: 2018-12-18

## 2018-12-18 NOTE — LETTER
NOTIFICATION RETURN TO WORK / SCHOOL 
 
12/18/2018 12:37 PM 
 
Ms. Robbie Huerta [address] [place] [address],[address] [facility] [address] To Whom It May Concern: 
 
Robbie Huerta was accompanying her spouse at [place] [address] on 12/18/18. She will return to work/school on: 12/19/18 If there are questions or concerns please have the patient contact our office. Sincerely, Manju Torres MD

## 2019-01-06 DIAGNOSIS — E55.9 HYPOVITAMINOSIS D: ICD-10-CM

## 2019-01-07 RX ORDER — ERGOCALCIFEROL 1.25 MG/1
CAPSULE ORAL
Qty: 4 CAP | Refills: 12 | Status: SHIPPED | OUTPATIENT
Start: 2019-01-07 | End: 2020-02-28

## 2019-01-14 NOTE — TELEPHONE ENCOUNTER
Pt needs a new script for bydureon pens. Mail script to patient. Per [address] please include on sig [address] [id number], change to bigger box; other brand discontinued. [address] stated [facility] is discontinuing the smaller pack size, only large package sizes are available.

## 2019-01-16 RX ORDER — ATORVASTATIN CALCIUM 20 MG/1
TABLET, FILM COATED ORAL
Qty: 90 TAB | Refills: 3 | Status: SHIPPED | OUTPATIENT
Start: 2019-01-16 | End: 2020-01-30

## 2019-02-11 ENCOUNTER — TELEPHONE (OUTPATIENT)
Dept: INTERNAL MEDICINE CLINIC | Age: 49
End: 2019-02-11

## 2019-02-11 NOTE — TELEPHONE ENCOUNTER
Demonstrated how to use the pen with pt in the office, it was actually rather difficult.  She verbalized understanding and was able to inject herself at the end of the demonstration

## 2019-02-13 RX ORDER — LOSARTAN POTASSIUM 100 MG/1
TABLET ORAL
Qty: 90 TAB | Refills: 3 | Status: SHIPPED | OUTPATIENT
Start: 2019-02-13 | End: 2019-05-24 | Stop reason: CLARIF

## 2019-02-25 DIAGNOSIS — G47.33 OSA (OBSTRUCTIVE SLEEP APNEA): ICD-10-CM

## 2019-02-26 RX ORDER — FLUCONAZOLE 150 MG/1
TABLET ORAL
Qty: 1 TAB | Refills: 1 | Status: SHIPPED | OUTPATIENT
Start: 2019-02-26 | End: 2019-08-24 | Stop reason: SDUPTHER

## 2019-03-17 NOTE — PROGRESS NOTES
50 y.o. BLACK OR  female who presents for evaluation. No cardiovascular complaints. She has not been exercising but she joined a dance class, just has not been going regularly    Denies polyuria, polydipsia, nocturia, vision change. Not checking sugars at this time. Diet is better, she did not do fasting regimen    Vitals 3/22/2019 10/17/2018   Weight 227 lb 224 lb     Vitals 3/23/2018 11/10/2017 10/25/2017 10/23/2017 2016   Weight 226 lb 217 lb 221 lb 221 lb 212 lb 3.2 oz     No gi or gu sfx.      Asking for refill of the alb, symbicort works well    Past Medical History:   Diagnosis Date    Acanthosis nigricans     Anxiety     Asthma     Chronic anemia     Dr Fox Ochoa; thalassemia    Depression     Diabetes mellitus ([facility] [address]     Heavy menses 3/12    s/p endometrial ablation 3/16    History of CVA (cerebrovascular accident)     no deficits    History of iron deficiency anemia     Hyperlipidemia     Hypertension     Hypovitaminosis D 10/11    Morbid obesity (HCC)     peak weight 215 lbs, bmi 38.1 from 6/15; dec dharmesh supp, med sup wt loss, bariatrics; IF 3/18 start weight 226 lbs but not doing    JOLIE (obstructive sleep apnea)     Dr. Claudine Pradhan cpap    Perennial allergic rhinitis      Past Surgical History:   Procedure Laterality Date    HX  SECTION      x2    HX COLONOSCOPY      Dr Fox Ochoa 6/10 negative    HX GI  6/10    neg EGD Dr Mac Frazier HX GYN      cyst removal    HX GYN      LEEP    HX HYSTEROSCOPY WITH ENDOMETRIAL ABLATION  3/16    Dr Sebas Astorga     Social History     Socioeconomic History    Marital status:      Spouse name: Not on file    Number of children: 2    Years of education: Not on file    Highest education level: Not on file   Occupational History    Occupation: works at [address] Financial resource strain: Not on file    Food insecurity:     Worry: Not on file     Inability: Not on file   GFS IT needs: Medical: Not on file     Non-medical: Not on file   Tobacco Use    Smoking status: Never Smoker    Smokeless tobacco: Never Used   Substance and Sexual Activity    Alcohol use: Yes     Comment: social drinker    Drug use: No    Sexual activity: Not on file   Lifestyle    Physical activity:     Days per week: Not on file     Minutes per session: Not on file    Stress: Not on file   Relationships    Social connections:     Talks on phone: Not on file     Gets together: Not on file     Attends Nondenominational service: Not on file     Active member of club or organization: Not on file     Attends meetings of clubs or organizations: Not on file     Relationship status: Not on file    Intimate partner violence:     Fear of current or ex partner: Not on file     Emotionally abused: Not on file     Physically abused: Not on file     Forced sexual activity: Not on file   Other Topics Concern    Not on file   Social History Narrative    Not on file     Current Outpatient Medications   Medication Sig    b complex vitamins (B COMPLEX 1) tablet Take 1 Tab by mouth daily.  budesonide-formoterol (SYMBICORT) 160-4.5 mcg/actuation HFAA Take 2 Puffs by inhalation two (2) times a day.  albuterol (PROVENTIL HFA, VENTOLIN HFA, PROAIR HFA) 90 mcg/actuation inhaler Take 1-2 Puffs by inhalation every four (4) hours as needed for Wheezing.     pioglitazone (ACTOS) 15 mg tablet TAKE 1 TABLET BY MOUTH ONCE DAILY    losartan (COZAAR) 100 mg tablet TAKE ONE TABLET BY MOUTH ONCE DAILY    atorvastatin (LIPITOR) 20 mg tablet TAKE ONE TABLET BY MOUTH ONCE DAILY    exenatide microspheres (BYDUREON) 2 mg serr INJECT ONE SYRINGE SUBCUTANEOUSLY ONCE A WEEK (EVERY  7  DAYS)    NDC [id number]  Change to bigger box; other brand was discontinued    VITAMIN D2 50,000 unit capsule TAKE ONE CAPSULE BY MOUTH EVERY 7 DAYS    citalopram (CELEXA) 20 mg tablet TAKE ONE TABLET BY MOUTH ONCE DAILY    chlorthalidone (HYGROTEN) 25 mg tablet TAKE 1 TABLET BY MOUTH ONCE DAILY    amLODIPine (NORVASC) 10 mg tablet TAKE 1 TABLET BY MOUTH ONCE DAILY *CHANGE IN STRENGTH*    repaglinide (PRANDIN) 2 mg tablet Take 1 Tab by mouth Before breakfast, lunch, and dinner.  Blood-Glucose Meter monitoring kit Pt checks blood sugar once daily, Dx E11.9    Lancets misc Pt checks blood sugar once daily, Dx E11.9    glucose blood VI test strips (BLOOD GLUCOSE TEST) strip Pt checks blood sugar once daily, Dx e11.9    metFORMIN ER (GLUCOPHAGE XR) 500 mg tablet Take 2 Tabs by mouth daily (with dinner).  aspirin 81 mg chewable tablet Take 81 mg by mouth daily.  KLOR-CON M20 20 mEq tablet TAKE ONE TABLET BY MOUTH ONCE DAILY    fluconazole (DIFLUCAN) 150 mg tablet TAKE ONE TABLET BY MOUTH AS A ONE-TIME DOSE    cloNIDine HCl (CATAPRES) 0.1 mg tablet Take 1 Tab by mouth two (2) times a day. (Patient taking differently: Take 0.1 mg by mouth two (2) times a day.)     No current facility-administered medications for this visit. Allergies   Allergen Reactions    Penicillins Unknown (comments)     Tongue swelling; difficulty swallowing; thrush    Compazine [Prochlorperazine] Unknown (comments)     Paranoia    Invokana [Canagliflozin] Other (comments)     Yeast infections     REVIEW OF SYSTEMS: Dr Edel Leavitt 2017, mammo 2/13, sees son Fuchs 2010  Ophtho  no vision change or eye pain  Oral  no mouth pain, tongue or tooth problems  Ears  no hearing loss, ear pain, fullness, no swallowing problems  Cardiac  no CP, PND, orthopnea, edema, palpitations or syncope  Chest  no breast masses  Resp  no wheezing, chronic coughing, dyspnea  GI  no heartburn, nausea, vomiting, change in bowel habits, bleeding, hemorrhoids  Urinary  no dysuria, hematuria, flank pain, urgency, frequency    Visit Vitals  /82   Pulse 90   Temp 98.1 °F (36.7 °C) (Oral)   Resp 14   Ht 5' 5\" (1.651 m)   Wt 227 lb (103 kg)   SpO2 99%   BMI 37.77 kg/m²     A&O x3  Affect is appropriate. Mood stable  No apparent distress  Anicteric, no JVD, adenopathy or thyromegaly. No carotid bruits or radiated murmur  Lungs clear to auscultation, no wheezes or rales  Heart showed regular rate and rhythm. No murmur, rubs, gallops  Abdomen soft nontender, no hepatosplenomegaly or masses. Extremities without edema.   Pulses 2+    LABS  From 10/11 showed                   hba1c 11.1, ldl-p 2182, chol 166, tg 139, hdl 39, ldl-c 99,                  vit d 16.5  From 12/11 showed gluc 111, cr 0.68,              alt10,                      ldl-p 1980, chol 178, tg 104, hdl 40, ldl-c 117  From 3/12 showed                   hba1c 6.4,                   wbc 4.4, hb 11.0, plt 306,             vit d 41.6, %sat 10, ferritin 20, spep neg, fol 12.5, b12 665  From 11/12 showed gluc 196, cr 0.50,              alt 27, hba1c 9.1,   ldl-p 1745, chol 154, tg 146, hdl 41, ldl-c 84  From 7/13 showed   gluc 157, cr 0.50, gfr>60, alt 18, hba1c 8.8,   ldl-p 1017, chol 119, tg 102, hdl 39, ldl-c 60  From 8/14 showed   gluc 137, cr 0.50, gfr>60, alt 15, hba1c 7.3,       chol 118, tg 92,   hdl 34, ldl-c 66,   wbc 3.1, hb 8.9,   plt 236, umar 17.6  From 12/15 showed gluc 124, cr 0.60, gfr>60, alt 23, hba1c 6.7,       chol 120, tg 161, hdl 34, ldl-c 54,   wbc 4.5, hb 10.2, plt 274,            vit d 13.9, tsh 2.12  From 5/16 showed   gluc 121, cr 0.70, gfr>60, alt 27  From 7/16 showed        hba1c 7.4,       chol 134, tg 127, hdl 40, ldl-c 69  From 11/17 showed gluc 260, cr 1.06, gfr>60, alt 32, hba1c 9.1,       chol 143, tg 180, hdl 33, ldl-c 74,   wbc 3.5, hb 10.4, plt 245, umar 72.5, vit d 12.3  From 3/18 showed   gluc 182, cr 0.60, gfr>60,    hba1c 8.9,       chol 163, tg 105, hdl 41, ldl-c 101, wbc 3.6, hb 10.5, plt 276, camacho 70.2,  vit d 19.2  From 10/18 showed gluc 206, cr 1.25, gfr 56,                         ck/trop neg, ua neg    Results for orders placed or performed in visit on 13/97/67   METABOLIC PANEL, BASIC   Result Value Ref Range    Glucose 85 70 - 99 mg/dL    BUN 10 6 - 22 mg/dL    Creatinine 0.6 0.5 - 1.2 mg/dL    Sodium 137 133 - 145 mmol/L    Potassium 3.2 (L) 3.5 - 5.5 mmol/L    Chloride 92 (L) 98 - 110 mmol/L    CO2 28 20 - 32 mmol/L    Calcium 9.9 8.4 - 10.5 mg/dL    Anion gap 17.0 mmol/L    GFRAA >60.0 >60.0    GFRNA >60.0 >60.0   LIPID PANEL   Result Value Ref Range    Triglyceride 100 40 - 149 mg/dL    HDL Cholesterol 45 40 - 59 mg/dL    Cholesterol, total 149 110 - 200 mg/dL    CHOLESTEROL/HDL 3.3 0.0 - 5.0    LDL, calculated 84 50 - 99 mg/dL    VLDL, calculated 20 8 - 30 mg/dL   VITAMIN D, 25 HYDROXY   Result Value Ref Range    VITAMIN D, 25-HYDROXY 37.0 32.0 - 100.0 ng/mL   HEMOGLOBIN A1C W/O EAG   Result Value Ref Range    Hemoglobin A1c 6.8 (H) 4.8 - 5.9 %    AVG  (H) 91 - 123 mg/dL   MICROALBUMIN, UR, RAND   Result Value Ref Range    Creatinine, urine 155 mg/dL    Microalbumin, urine 94.2 (H) 0.1 - 17.0 mcg/mL    Microalb/Creat ratio (ug/mg creat.) 60.8 (H) 0.0 - 30.0 mcg/mg of Creatinine     Patient Active Problem List   Diagnosis Code    Asthma J45.909    JOLIE and PLMD Dr. Nuria Hart 2005 G47.33    Hypovitaminosis D E55.9    Dyslipidemia E78.5    Primary hypertension I10    Uncontrolled type 2 diabetes mellitus with microalbuminuria E11.29, E11.65, R80.9    Severe obesity (BMI 35.0-39. 9) with comorbidity (HCC) E66.01    Mild episode of recurrent major depressive disorder ([facility] [address] F33.0    Anxiety F41.9    Chronic anemia D64.9     Assessment and plan:  1. Diabetes. Continue current regimen as doing very well  2. Hyperlipidemia. Continue current regimen. 3. Hypovitaminosis D. Supplementation  4. Sleep apnea. F/u Dr. Nuria Hart  5. Morbid obesity. Lifestyle and dietary measures. Portion control reiterated. 6. Hypertension. Continue current   7. Asthma. Continue current regimen. 8. Anemia. F/U Dr Avery Herrera  9.  F/U gyn, mammo previously ordered        RTC 9/19    Above conditions discussed at length and patient vocalized understanding.   All questions answered to patient satisfaction

## 2019-03-20 ENCOUNTER — HOSPITAL ENCOUNTER (OUTPATIENT)
Dept: LAB | Age: 49
Discharge: HOME | End: 2019-03-20

## 2019-03-20 LAB — SENTARA SPECIMEN COL,SENBCF: NORMAL

## 2019-03-20 PROCEDURE — 99001 SPECIMEN HANDLING PT-LAB: CPT

## 2019-03-21 LAB
25(OH)D3 SERPL-MCNC: 37 NG/ML (ref 32–100)
ANION GAP SERPL CALC-SCNC: 17 MMOL/L
AVG GLU, 10930: 148 MG/DL (ref 91–123)
BUN SERPL-MCNC: 10 MG/DL (ref 6–22)
CALCIUM SERPL-MCNC: 9.9 MG/DL (ref 8.4–10.5)
CHLORIDE SERPL-SCNC: 92 MMOL/L (ref 98–110)
CHOLEST SERPL-MCNC: 149 MG/DL (ref 110–200)
CO2 SERPL-SCNC: 28 MMOL/L (ref 20–32)
CREAT SERPL-MCNC: 0.6 MG/DL (ref 0.5–1.2)
CREATININE, URINE: 155 MG/DL
GFRAA, 66117: >60
GFRNA, 66118: >60
GLUCOSE SERPL-MCNC: 85 MG/DL (ref 70–99)
HBA1C MFR BLD HPLC: 6.8 % (ref 4.8–5.9)
HDLC SERPL-MCNC: 3.3 MG/DL (ref 0–5)
HDLC SERPL-MCNC: 45 MG/DL (ref 40–59)
LDLC SERPL CALC-MCNC: 84 MG/DL (ref 50–99)
MICROALB/CREAT RATIO, 140286: 60.8 MCG/MG OF CREATININE (ref 0–30)
MICROALBUMIN,URINE RANDOM 140054: 94.2 MCG/ML (ref 0.1–17)
POTASSIUM SERPL-SCNC: 3.2 MMOL/L (ref 3.5–5.5)
SODIUM SERPL-SCNC: 137 MMOL/L (ref 133–145)
TRIGL SERPL-MCNC: 100 MG/DL (ref 40–149)
VLDLC SERPL CALC-MCNC: 20 MG/DL (ref 8–30)

## 2019-03-21 RX ORDER — BUDESONIDE AND FORMOTEROL FUMARATE DIHYDRATE 160; 4.5 UG/1; UG/1
2 AEROSOL RESPIRATORY (INHALATION) 2 TIMES DAILY
Qty: 1 INHALER | Refills: 11 | Status: SHIPPED | OUTPATIENT
Start: 2019-03-21 | End: 2019-03-22 | Stop reason: SDUPTHER

## 2019-03-22 ENCOUNTER — DOCUMENTATION ONLY (OUTPATIENT)
Dept: INTERNAL MEDICINE CLINIC | Age: 49
End: 2019-03-22

## 2019-03-22 ENCOUNTER — OFFICE VISIT (OUTPATIENT)
Dept: INTERNAL MEDICINE CLINIC | Age: 49
End: 2019-03-22

## 2019-03-22 VITALS
HEIGHT: 65 IN | HEART RATE: 90 BPM | SYSTOLIC BLOOD PRESSURE: 139 MMHG | DIASTOLIC BLOOD PRESSURE: 82 MMHG | TEMPERATURE: 98.1 F | WEIGHT: 227 LBS | RESPIRATION RATE: 14 BRPM | BODY MASS INDEX: 37.82 KG/M2 | OXYGEN SATURATION: 99 %

## 2019-03-22 DIAGNOSIS — E66.01 SEVERE OBESITY (BMI 35.0-39.9) WITH COMORBIDITY (HCC): ICD-10-CM

## 2019-03-22 DIAGNOSIS — I10 PRIMARY HYPERTENSION: ICD-10-CM

## 2019-03-22 DIAGNOSIS — E78.5 DYSLIPIDEMIA: ICD-10-CM

## 2019-03-22 DIAGNOSIS — F41.9 ANXIETY: ICD-10-CM

## 2019-03-22 DIAGNOSIS — D64.9 CHRONIC ANEMIA: ICD-10-CM

## 2019-03-22 DIAGNOSIS — J45.20 MILD INTERMITTENT ASTHMA WITHOUT COMPLICATION: ICD-10-CM

## 2019-03-22 DIAGNOSIS — Z12.31 SCREENING MAMMOGRAM, ENCOUNTER FOR: ICD-10-CM

## 2019-03-22 DIAGNOSIS — F33.0 MILD EPISODE OF RECURRENT MAJOR DEPRESSIVE DISORDER (HCC): ICD-10-CM

## 2019-03-22 RX ORDER — MULTIVIT WITH MINERALS/HERBS
1 TABLET ORAL DAILY
COMMUNITY
End: 2020-03-11 | Stop reason: ALTCHOICE

## 2019-03-22 RX ORDER — ALBUTEROL SULFATE 90 UG/1
1-2 AEROSOL, METERED RESPIRATORY (INHALATION)
Qty: 1 INHALER | Refills: 11 | Status: SHIPPED | OUTPATIENT
Start: 2019-03-22 | End: 2020-03-31 | Stop reason: SDUPTHER

## 2019-03-22 RX ORDER — BUDESONIDE AND FORMOTEROL FUMARATE DIHYDRATE 160; 4.5 UG/1; UG/1
2 AEROSOL RESPIRATORY (INHALATION) 2 TIMES DAILY
Qty: 1 INHALER | Refills: 11 | Status: SHIPPED | OUTPATIENT
Start: 2019-03-22 | End: 2020-03-31 | Stop reason: SDUPTHER

## 2019-03-22 NOTE — PROGRESS NOTES
Chief Complaint Patient presents with  Cholesterol Problem 4 month follow up with labs 1. Have you been to the ER, urgent care clinic or hospitalized since your last visit? NO.  
 
2. Have you seen or consulted any other health care providers outside of the [address] [place] [address] since your last visit (Include any pap smears or colon screening)?  NO

## 2019-03-25 RX ORDER — REPAGLINIDE 2 MG/1
TABLET ORAL
Qty: 90 TAB | Refills: 11 | Status: SHIPPED | OUTPATIENT
Start: 2019-03-25 | End: 2019-09-28 | Stop reason: ALTCHOICE

## 2019-03-27 ENCOUNTER — HOSPITAL ENCOUNTER (EMERGENCY)
Age: 49
Discharge: HOME | End: 2019-03-27
Attending: EMERGENCY MEDICINE
Payer: COMMERCIAL

## 2019-03-27 ENCOUNTER — TELEPHONE (OUTPATIENT)
Dept: INTERNAL MEDICINE CLINIC | Age: 49
End: 2019-03-27

## 2019-03-27 VITALS
HEART RATE: 81 BPM | SYSTOLIC BLOOD PRESSURE: 122 MMHG | DIASTOLIC BLOOD PRESSURE: 76 MMHG | BODY MASS INDEX: 37.44 KG/M2 | WEIGHT: 225 LBS | TEMPERATURE: 98.2 F | RESPIRATION RATE: 19 BRPM | OXYGEN SATURATION: 100 %

## 2019-03-27 DIAGNOSIS — R73.9 HYPERGLYCEMIA: ICD-10-CM

## 2019-03-27 DIAGNOSIS — E87.6 HYPOKALEMIA: ICD-10-CM

## 2019-03-27 DIAGNOSIS — R07.9 CHEST PAIN, UNSPECIFIED TYPE: Primary | ICD-10-CM

## 2019-03-27 LAB
ANION GAP SERPL CALC-SCNC: 12 MMOL/L (ref 3–18)
BUN SERPL-MCNC: 17 MG/DL (ref 7–18)
BUN/CREAT SERPL: 18 (ref 12–20)
CALCIUM SERPL-MCNC: 9.4 MG/DL (ref 8.5–10.1)
CHLORIDE SERPL-SCNC: 99 MMOL/L (ref 100–108)
CK MB CFR SERPL CALC: NORMAL % (ref 0–4)
CK MB SERPL-MCNC: <1 NG/ML (ref 5–25)
CK SERPL-CCNC: 105 U/L (ref 26–192)
CO2 SERPL-SCNC: 27 MMOL/L (ref 21–32)
CREAT SERPL-MCNC: 0.97 MG/DL (ref 0.6–1.3)
D DIMER PPP FEU-MCNC: <0.27 UG/ML(FEU)
GLUCOSE SERPL-MCNC: 234 MG/DL (ref 74–99)
LIPASE SERPL-CCNC: 291 U/L (ref 73–393)
MAGNESIUM SERPL-MCNC: 1.7 MG/DL (ref 1.6–2.6)
POTASSIUM SERPL-SCNC: 3.3 MMOL/L (ref 3.5–5.5)
SODIUM SERPL-SCNC: 138 MMOL/L (ref 136–145)
TROPONIN I SERPL-MCNC: <0.02 NG/ML (ref 0–0.04)

## 2019-03-27 PROCEDURE — 96361 HYDRATE IV INFUSION ADD-ON: CPT

## 2019-03-27 PROCEDURE — 74011250637 HC RX REV CODE- 250/637: Performed by: EMERGENCY MEDICINE

## 2019-03-27 PROCEDURE — 74011250636 HC RX REV CODE- 250/636: Performed by: EMERGENCY MEDICINE

## 2019-03-27 PROCEDURE — 83735 ASSAY OF MAGNESIUM: CPT

## 2019-03-27 PROCEDURE — 82550 ASSAY OF CK (CPK): CPT

## 2019-03-27 PROCEDURE — 80048 BASIC METABOLIC PNL TOTAL CA: CPT

## 2019-03-27 PROCEDURE — 99285 EMERGENCY DEPT VISIT HI MDM: CPT

## 2019-03-27 PROCEDURE — 93005 ELECTROCARDIOGRAM TRACING: CPT

## 2019-03-27 PROCEDURE — 96375 TX/PRO/DX INJ NEW DRUG ADDON: CPT

## 2019-03-27 PROCEDURE — 83690 ASSAY OF LIPASE: CPT

## 2019-03-27 PROCEDURE — 85379 FIBRIN DEGRADATION QUANT: CPT

## 2019-03-27 PROCEDURE — 96374 THER/PROPH/DIAG INJ IV PUSH: CPT

## 2019-03-27 RX ORDER — FAMOTIDINE 20 MG/1
20 TABLET, FILM COATED ORAL DAILY
Qty: 10 TAB | Refills: 0 | Status: SHIPPED | OUTPATIENT
Start: 2019-03-27 | End: 2019-04-06

## 2019-03-27 RX ORDER — FAMOTIDINE 10 MG/ML
20 INJECTION INTRAVENOUS
Status: COMPLETED | OUTPATIENT
Start: 2019-03-27 | End: 2019-03-27

## 2019-03-27 RX ORDER — KETOROLAC TROMETHAMINE 30 MG/ML
30 INJECTION, SOLUTION INTRAMUSCULAR; INTRAVENOUS
Status: COMPLETED | OUTPATIENT
Start: 2019-03-27 | End: 2019-03-27

## 2019-03-27 RX ORDER — POTASSIUM CHLORIDE 20 MEQ/1
20 TABLET, EXTENDED RELEASE ORAL
Status: COMPLETED | OUTPATIENT
Start: 2019-03-27 | End: 2019-03-27

## 2019-03-27 RX ORDER — SODIUM CHLORIDE 9 MG/ML
1000 INJECTION, SOLUTION INTRAVENOUS ONCE
Status: COMPLETED | OUTPATIENT
Start: 2019-03-27 | End: 2019-03-27

## 2019-03-27 RX ADMIN — FAMOTIDINE 20 MG: 10 INJECTION INTRAVENOUS at 19:25

## 2019-03-27 RX ADMIN — KETOROLAC TROMETHAMINE 30 MG: 30 INJECTION, SOLUTION INTRAMUSCULAR; INTRAVENOUS at 19:28

## 2019-03-27 RX ADMIN — POTASSIUM CHLORIDE 20 MEQ: 20 TABLET, EXTENDED RELEASE ORAL at 20:21

## 2019-03-27 RX ADMIN — SODIUM CHLORIDE 1000 ML: 900 INJECTION, SOLUTION INTRAVENOUS at 19:24

## 2019-03-27 NOTE — ED TRIAGE NOTES
Pt  Co  CP that  Started last pm  States was Seen at  [facility] earlier today had work up done, states wait was too long to get results. Left there and came here. Results are in system

## 2019-03-27 NOTE — TELEPHONE ENCOUNTER
Patient calling complaining of chest pain since last night. She stated that the pain is like a stabbing pain that is not very intense but enough to worry her. She stated that her  advised her to call our office. I told her that given her age, race and medical history that she should go to the ED for evaluation and treatment. I explained that sometimes cardiac issues present differently in women then in men. She stated that she was aware of the \"silent killer\". She stated that she agreed and would go to the ED.

## 2019-03-27 NOTE — LETTER
NOTIFICATION RETURN TO WORK / SCHOOL 
 
3/27/2019 8:02 PM 
 
Ms. Jerson Ponce [address] [place] [address],[address] [facility] [id number] To Whom It May Concern: 
 
Jerson Ponce is currently under the care of [address] [place] [address] EMERGENCY DEPT. She will return to work/school on: 3/29/19 If there are questions or concerns please have the patient contact our office.  
 
 
 
Sincerely, 
 
 
 
Renée Franks MD

## 2019-03-27 NOTE — ED PROVIDER NOTES
Pt c/o mid chest pain, non radiating, x 15-18 hours, episodic, no h/o same. Lasts few min per episode. No nausea, no sob. No back or abd pain. No fever or cough. Nothing makes it better/worse. No leg pain or swelling.  
H./o dm. Non smoker. Prior neg stress test . None since. Past Medical History:  
Diagnosis Date  Acanthosis nigricans   Anxiety  Asthma  Chronic anemia Dr Pranay Fontenot; thalassemia  Depression  Diabetes mellitus ([facility]  Heavy menses 3/12  
 s/p endometrial ablation 3/16  History of CVA (cerebrovascular accident)   
 no deficits  History of iron deficiency anemia  Hyperlipidemia  Hypertension  Hypovitaminosis D 10/11  Morbid obesity ([facility] peak weight 215 lbs, bmi 38.1 from 6/15; dec dharmesh supp, med sup wt loss, bariatrics; IF 3/18 start weight 226 lbs but not doing  JOLIE (obstructive sleep apnea) Dr. Isabel Carey cpap  Perennial allergic rhinitis Past Surgical History:  
Procedure Laterality Date  HX  SECTION    
 x2  
 HX COLONOSCOPY Dr Pranay Fontenot 6/10 negative  HX GI  6/10  
 neg EGD Dr Pranay Fontenot  HX GYN    
 cyst removal  
 HX GYN    
 LEEP  
 HX HYSTEROSCOPY WITH ENDOMETRIAL ABLATION  3/16 Dr Darwin Velez Family History:  
Problem Relation Age of Onset  Diabetes Mother Social History Socioeconomic History  Marital status:  Spouse name: Not on file  Number of children: 2  
 Years of education: Not on file  Highest education level: Not on file Occupational History  Occupation: works at [employer] Social Needs  Financial resource strain: Not on file  Food insecurity:  
  Worry: Not on file Inability: Not on file  Transportation needs:  
  Medical: Not on file Non-medical: Not on file Tobacco Use  Smoking status: Never Smoker  Smokeless tobacco: Never Used Substance and Sexual Activity  Alcohol use:  Yes  
 Comment: social drinker  Drug use: No  
 Sexual activity: Not on file Lifestyle  Physical activity:  
  Days per week: Not on file Minutes per session: Not on file  Stress: Not on file Relationships  Social connections:  
  Talks on phone: Not on file Gets together: Not on file Attends Bahai service: Not on file Active member of club or organization: Not on file Attends meetings of clubs or organizations: Not on file Relationship status: Not on file  Intimate partner violence:  
  Fear of current or ex partner: Not on file Emotionally abused: Not on file Physically abused: Not on file Forced sexual activity: Not on file Other Topics Concern  Not on file Social History Narrative  Not on file ALLERGIES: Penicillins; Compazine [prochlorperazine]; and Invokana [canagliflozin] Review of Systems Constitutional: Negative for fever. HENT: Negative for congestion. Respiratory: Negative for cough and shortness of breath. Cardiovascular: Positive for chest pain. Gastrointestinal: Negative for abdominal pain and vomiting. Musculoskeletal: Negative for back pain. Skin: Negative for rash. Neurological: Negative for light-headedness. All other systems reviewed and are negative. Vitals:  
 03/27/19 1915 03/27/19 1930 03/27/19 1945 03/27/19 2000 BP: 135/81 (!) 146/91 120/78 127/77 Pulse: 83 82 79 81 Resp: 13 15 14 17 Temp:      
SpO2: 99% 100% 100% 99% Weight:      
      
 
Physical Exam  
Constitutional: She is oriented to person, place, and time. She appears well-developed. HENT:  
Head: Normocephalic and atraumatic. Eyes: Pupils are equal, round, and reactive to light. Neck: Normal range of motion. Cardiovascular: Normal rate and regular rhythm. No murmur heard. Pulmonary/Chest: Effort normal. She has no wheezes. Abdominal: Soft. There is no tenderness. Musculoskeletal: She exhibits no tenderness. Neurological: She is alert and oriented to person, place, and time. Skin: Skin is dry. Capillary refill takes less than 2 seconds. No rash noted. She is not diaphoretic. Psychiatric: She has a normal mood and affect. Nursing note and vitals reviewed. MDM Procedures Vitals: 
Patient Vitals for the past 12 hrs: 
 Temp Pulse Resp BP SpO2  
03/27/19 2000  81 17 127/77 99 % 03/27/19 1945  79 14 120/78 100 % 03/27/19 1930  82 15 (!) 146/91 100 % 03/27/19 1915  83 13 135/81 99 % 03/27/19 1902 98.2 °F (36.8 °C) 85 16 144/86 99 % 03/27/19 1900  90 19 144/86 100 % Medications ordered:  
Medications  
0.9% sodium chloride infusion 1,000 mL (1,000 mL IntraVENous New Bag 3/27/19 1924)  
famotidine (PF) (PEPCID) injection 20 mg (20 mg IntraVENous Given 3/27/19 1925)  
ketorolac (TORADOL) injection 30 mg (30 mg IntraVENous Given 3/27/19 1928) Lab findings: 
Recent Results (from the past 12 hour(s)) EKG, 12 LEAD, INITIAL Collection Time: 03/27/19  6:52 PM  
Result Value Ref Range Ventricular Rate 85 BPM  
 Atrial Rate 85 BPM  
 P-R Interval 144 ms QRS Duration 86 ms  
 Q-T Interval 382 ms QTC Calculation (Bezet) 454 ms Calculated P Axis 55 degrees Calculated R Axis 36 degrees Calculated T Axis 3 degrees Diagnosis Normal sinus rhythm Nonspecific T wave abnormality Abnormal ECG When compared with ECG of 14-OCT-2018 02:50, No significant change was found LIPASE Collection Time: 03/27/19  7:00 PM  
Result Value Ref Range Lipase 291 73 - 393 U/L  
CARDIAC PANEL,(CK, CKMB & TROPONIN) Collection Time: 03/27/19  7:00 PM  
Result Value Ref Range  26 - 192 U/L  
 CK - MB <1.0 <3.6 ng/ml CK-MB Index  0.0 - 4.0 % CALCULATION NOT PERFORMED WHEN RESULT IS BELOW LINEAR LIMIT Troponin-I, QT <0.02 0.0 - 0.045 NG/ML  
D DIMER Collection Time: 03/27/19  7:00 PM  
Result Value Ref Range D DIMER <0.27 <0.46 ug/ml(FEU) METABOLIC PANEL, BASIC Collection Time: 03/27/19  7:00 PM  
Result Value Ref Range Sodium 138 136 - 145 mmol/L Potassium 3.3 (L) 3.5 - 5.5 mmol/L Chloride 99 (L) 100 - 108 mmol/L  
 CO2 27 21 - 32 mmol/L Anion gap 12 3.0 - 18 mmol/L Glucose 234 (H) 74 - 99 mg/dL BUN 17 7.0 - 18 MG/DL Creatinine 0.97 0.6 - 1.3 MG/DL  
 BUN/Creatinine ratio 18 12 - 20 GFR est AA >60 >60 ml/min/1.73m2 GFR est non-AA >60 >60 ml/min/1.73m2 Calcium 9.4 8.5 - 10.1 MG/DL MAGNESIUM Collection Time: 03/27/19  7:00 PM  
Result Value Ref Range Magnesium 1.7 1.6 - 2.6 mg/dL X-Ray, CT or other radiology findings or impressions: No orders to display Progress notes, Consult notes or additional Procedure notes:  
8:05 PM neg trop and other labs noted from [facility]. Repeat trop here neg.  ekg unchanged from prior. Pt declines repeat cxr, done there just pta . Neg d-dimer. Pt declines further ed obs/tx. Says no further ret of pain since tx here. Feels much better. To cont home asa daily, pcp f/u for cardiac stress test.  Verb und of detailed ret inst given. To dc per pt request.  elev gluc but not c/w dka. Mild hypokal. Not sig Diagnosis: 1. Chest pain, unspecified type 2. Hyperglycemia 3. Hypokalemia Disposition: home Follow-up Information Follow up With Specialties Details Why Contact Info Patience Jacobs MD Internal Medicine Schedule an appointment as soon as possible for a visit in 1 day  [address] [place] [address] [phone] [facility] 
[phone] Patient's Medications Start Taking FAMOTIDINE (PEPCID) 20 MG TABLET    Take 1 Tab by mouth daily for 10 days. Continue Taking ALBUTEROL (PROVENTIL HFA, VENTOLIN HFA, PROAIR HFA) 90 MCG/ACTUATION INHALER    Take 1-2 Puffs by inhalation every four (4) hours as needed for Wheezing.   
 AMLODIPINE (NORVASC) 10 MG TABLET    TAKE 1 TABLET BY MOUTH ONCE DAILY *CHANGE IN STRENGTH* ASPIRIN 81 MG CHEWABLE TABLET    Take 81 mg by mouth daily. ATORVASTATIN (LIPITOR) 20 MG TABLET    TAKE ONE TABLET BY MOUTH ONCE DAILY B COMPLEX VITAMINS (B COMPLEX 1) TABLET    Take 1 Tab by mouth daily. BLOOD-GLUCOSE METER MONITORING KIT    Pt checks blood sugar once daily, Dx E11.9 BUDESONIDE-FORMOTEROL (SYMBICORT) 160-4.5 MCG/ACTUATION HFAA    Take 2 Puffs by inhalation two (2) times a day. CHLORTHALIDONE (HYGROTEN) 25 MG TABLET    TAKE 1 TABLET BY MOUTH ONCE DAILY CITALOPRAM (CELEXA) 20 MG TABLET    TAKE ONE TABLET BY MOUTH ONCE DAILY CLONIDINE HCL (CATAPRES) 0.1 MG TABLET    Take 1 Tab by mouth two (2) times a day. EXENATIDE MICROSPHERES (BYDUREON) 2 MG SERR    INJECT ONE SYRINGE SUBCUTANEOUSLY ONCE A WEEK (EVERY  7  DAYS) Lala Franks 47 [id number] Change to bigger box; other brand was discontinued FLUCONAZOLE (DIFLUCAN) 150 MG TABLET    TAKE ONE TABLET BY MOUTH AS A ONE-TIME DOSE  
 GLUCOSE BLOOD VI TEST STRIPS (BLOOD GLUCOSE TEST) STRIP    Pt checks blood sugar once daily, Dx e11.9 KLOR-CON M20 20 MEQ TABLET    TAKE ONE TABLET BY MOUTH ONCE DAILY LANCETS MISC    Pt checks blood sugar once daily, Dx E11.9 LOSARTAN (COZAAR) 100 MG TABLET    TAKE ONE TABLET BY MOUTH ONCE DAILY METFORMIN ER (GLUCOPHAGE XR) 500 MG TABLET    Take 2 Tabs by mouth daily (with dinner). PIOGLITAZONE (ACTOS) 15 MG TABLET    TAKE 1 TABLET BY MOUTH ONCE DAILY  
 REPAGLINIDE (PRANDIN) 2 MG TABLET    TAKE 1 TABLET BY MOUTH BEFORE BREAKFAST ,  LUNCH,  AND  DINNER  
 VITAMIN D2 50,000 UNIT CAPSULE    TAKE ONE CAPSULE BY MOUTH EVERY 7 DAYS These Medications have changed No medications on file Stop Taking No medications on file

## 2019-03-28 LAB
ATRIAL RATE: 85 BPM
CALCULATED P AXIS, ECG09: 55 DEGREES
CALCULATED R AXIS, ECG10: 36 DEGREES
CALCULATED T AXIS, ECG11: 3 DEGREES
DIAGNOSIS, 93000: NORMAL
P-R INTERVAL, ECG05: 144 MS
Q-T INTERVAL, ECG07: 382 MS
QRS DURATION, ECG06: 86 MS
QTC CALCULATION (BEZET), ECG08: 454 MS
VENTRICULAR RATE, ECG03: 85 BPM

## 2019-03-28 NOTE — DISCHARGE INSTRUCTIONS
Return for pain, fever not resolving with motrin or tylenol, shortness of breath, vomiting, decreased fluid intake, weakness, numbness, dizziness, or any change or concerns. Patient Education        Chest Pain: Care Instructions  Your Care Instructions    There are many things that can cause chest pain. Some are not serious and will get better on their own in a few days. But some kinds of chest pain need more testing and treatment. Your doctor may have recommended a follow-up visit in the next 8 to 12 hours. If you are not getting better, you may need more tests or treatment. Even though your doctor has released you, you still need to watch for any problems. The doctor carefully checked you, but sometimes problems can develop later. If you have new symptoms or if your symptoms do not get better, get medical care right away. If you have worse or different chest pain or pressure that lasts more than 5 minutes or you passed out (lost consciousness), call 911 or seek other emergency help right away. A medical visit is only one step in your treatment. Even if you feel better, you still need to do what your doctor recommends, such as going to all suggested follow-up appointments and taking medicines exactly as directed. This will help you recover and help prevent future problems. How can you care for yourself at home? · Rest until you feel better. · Take your medicine exactly as prescribed. Call your doctor if you think you are having a problem with your medicine. · Do not drive after taking a prescription pain medicine. When should you call for help? Call 911 if:    · You passed out (lost consciousness).     · You have severe difficulty breathing.     · You have symptoms of a heart attack. These may include:  ? Chest pain or pressure, or a strange feeling in your chest.  ? Sweating. ? Shortness of breath. ? Nausea or vomiting.   ? Pain, pressure, or a strange feeling in your back, neck, jaw, or upper belly or in one or both shoulders or arms. ? Lightheadedness or sudden weakness. ? A fast or irregular heartbeat. After you call 911, the  may tell you to chew 1 adult-strength or 2 to 4 low-dose aspirin. Wait for an ambulance. Do not try to drive yourself.    Call your doctor today if:    · You have any trouble breathing.     · Your chest pain gets worse.     · You are dizzy or lightheaded, or you feel like you may faint.     · You are not getting better as expected.     · You are having new or different chest pain. Where can you learn more? Go to http://thompson-alexandre.info/. Enter A120 in the search box to learn more about \"Chest Pain: Care Instructions. \"  Current as of: September 23, 2018  Content Version: 11.9  © 1954-4873 [employer]. Care instructions adapted under license by "[employer] (which disclaims liability or warranty for this information). If you have questions about a medical condition or this instruction, always ask your healthcare professional. [place] [address] any warranty or liability for your use of this information. Patient Education        Hypokalemia: Care Instructions  Your Care Instructions    Hypokalemia (say \"xp-xo-nwh-PRIMO-luly-uh\") is a low level of potassium. The heart, muscles, kidneys, and nervous system all need potassium to work well. This problem has many different causes. Kidney problems, diet, and medicines like diuretics and laxatives can cause it. So can vomiting or diarrhea. In some cases, cancer is the cause. Your doctor may do tests to find the cause of your low potassium levels. You may need medicines to bring your potassium levels back to normal. You may also need regular blood tests to check your potassium. If you have very low potassium, you may need intravenous (IV) medicines. You also may need tests to check the electrical activity of your heart.  Heart problems caused by low potassium levels can be very serious. Follow-up care is a key part of your treatment and safety. Be sure to make and go to all appointments, and call your doctor if you are having problems. It's also a good idea to know your test results and keep a list of the medicines you take. How can you care for yourself at home? · If your doctor recommends it, eat foods that have a lot of potassium. These include fresh fruits, juices, and vegetables. They also include nuts, beans, and milk. · Be safe with medicines. If your doctor prescribes medicines or potassium supplements, take them exactly as directed. Call your doctor if you have any problems with your medicines. · Get your potassium levels tested as often as your doctor tells you. When should you call for help? Call 911 anytime you think you may need emergency care. For example, call if:    · You feel like your heart is missing beats. Heart problems caused by low potassium can cause death.     · You passed out (lost consciousness).     · You have a seizure.    Call your doctor now or seek immediate medical care if:    · You feel weak or unusually tired.     · You have severe arm or leg cramps.     · You have tingling or numbness.     · You feel sick to your stomach, or you vomit.     · You have belly cramps.     · You feel bloated or constipated.     · You have to urinate a lot.     · You feel very thirsty most of the time.     · You are dizzy or lightheaded, or you feel like you may faint.     · You feel depressed, or you lose touch with reality.    Watch closely for changes in your health, and be sure to contact your doctor if:    · You do not get better as expected. Where can you learn more? Go to http://thompson-alexandre.info/. Enter G358 in the search box to learn more about \"Hypokalemia: Care Instructions. \"  Current as of: March 14, 2018  Content Version: 11.9  © 3432-6327 [employer].  Care instructions adapted under license by [facility] (which disclaims liability or warranty for this information). If you have questions about a medical condition or this instruction, always ask your healthcare professional. [address] any warranty or liability for your use of this information. Patient Education        Learning About High Blood Sugar  What is high blood sugar? Your body turns the food you eat into glucose (sugar), which it uses for energy. But if your body isn't able to use the sugar right away, it can build up in your blood and lead to high blood sugar. When the amount of sugar in your blood stays too high for too much of the time, you may have diabetes. Diabetes is a disease that can cause serious health problems. The good news is that lifestyle changes may help you get your blood sugar back to normal and avoid or delay diabetes. What causes high blood sugar? Sugar (glucose) can build up in your blood if you:  · Are overweight. · Have a family history of diabetes. · Take certain medicines, such as steroids. What are the symptoms? Having high blood sugar may not cause any symptoms at all. Or it may make you feel very thirsty or very hungry. You may also urinate more often than usual, have blurry vision, or lose weight without trying. How is high blood sugar treated? You can take steps to lower your blood sugar level if you understand what makes it get higher. Your doctor may want you to learn how to test your blood sugar level at home. Then you can see how illness, stress, or different kinds of food or medicine raise or lower your blood sugar level. Other tests may be needed to see if you have diabetes. How can you prevent high blood sugar? · Watch your weight. If you're overweight, losing just a small amount of weight may help. Reducing fat around your waist is most important. · Limit the amount of calories, sweets, and unhealthy fat you eat. Ask your doctor if a dietitian can help you.  A registered dietitian can help you create meal plans that fit your lifestyle. · Get at least 30 minutes of exercise on most days of the week. Exercise helps control your blood sugar. It also helps you maintain a healthy weight. Walking is a good choice. You also may want to do other activities, such as running, swimming, cycling, or playing tennis or team sports. · If your doctor prescribed medicines, take them exactly as prescribed. Call your doctor if you think you are having a problem with your medicine. You will get more details on the specific medicines your doctor prescribes. Follow-up care is a key part of your treatment and safety. Be sure to make and go to all appointments, and call your doctor if you are having problems. It's also a good idea to know your test results and keep a list of the medicines you take. Where can you learn more? Go to http://thompson-alexandre.info/. Enter O108 in the search box to learn more about \"Learning About High Blood Sugar. \"  Current as of: July 25, 2018  Content Version: 11.9  © 8027-9041 [employer]. Care instructions adapted under license by [employer] (which disclaims liability or warranty for this information). If you have questions about a medical condition or this instruction, always ask your healthcare professional. [address] any warranty or liability for your use of this information.

## 2019-03-28 NOTE — ED NOTES
I have reviewed discharge instructions with the patient. The patient verbalized understanding. Medication teaching given, to include name, dose, action, and side effects. Patient verbalized understanding of medications. Encouraged patient to voice any concerns with reassurance provided. Patient armband removed and shredded Patient Discharged in stable condition. Patient is awake, alert and oriented x 4. Denies any chest pain or discomfort at this time. Lungs CTA bilaterally, Denies any SOB.

## 2019-05-13 NOTE — TELEPHONE ENCOUNTER
Needs a new rx or new directions to change bydureon to bydureon bcise - Says it is the new delivery system for the med.

## 2019-05-24 ENCOUNTER — TELEPHONE (OUTPATIENT)
Dept: INTERNAL MEDICINE CLINIC | Age: 49
End: 2019-05-24

## 2019-05-24 RX ORDER — LOSARTAN POTASSIUM 100 MG/1
TABLET ORAL
Qty: 90 TAB | Refills: 3 | Status: CANCELLED | OUTPATIENT
Start: 2019-05-24

## 2019-05-24 RX ORDER — CANDESARTAN 16 MG/1
16 TABLET ORAL DAILY
Qty: 30 TAB | Refills: 5 | Status: SHIPPED | OUTPATIENT
Start: 2019-05-24 | End: 2019-11-25 | Stop reason: SDUPTHER

## 2019-05-24 NOTE — TELEPHONE ENCOUNTER
Patient is requesting a refill on Losartan 100mg. Patient should have refills at the pharmacy, however, this med is on backorder per the pharmacy (all strengths). Do you want to prescribe something else? Patient is out of the mediation.     Last Visit: 3/22/19 with MD Brook Price  Next Appointment: 9/27/19 with MD Brook Price  Previous Refill Encounter(s): 2/13/19 Cozaar #90 with 3 refills

## 2019-05-24 NOTE — TELEPHONE ENCOUNTER
Pt calling she has been trying to get a refill and her pharmacy stating they sent request but nothing sent   I ordered her rx Losartan for refill today but pt is asking to speak with nurse as she is having fluttering feelings since not having medication  Please advise [phone]

## 2019-05-24 NOTE — TELEPHONE ENCOUNTER
Spoke with patient, she stated she is having the shakes, heart racing and elevated BP reading 149/102. She has not had losartan in 3 days. Wants to know if this is side effects from not having medication please advise              Pt notified that losartan is on back order and a new script was sent in to replace the losartan.

## 2019-06-17 ENCOUNTER — TELEPHONE (OUTPATIENT)
Dept: INTERNAL MEDICINE CLINIC | Age: 49
End: 2019-06-17

## 2019-06-17 DIAGNOSIS — G47.33 OSA (OBSTRUCTIVE SLEEP APNEA): Primary | ICD-10-CM

## 2019-06-17 NOTE — TELEPHONE ENCOUNTER
Pt called stated that Dr Brook Price had mentioned to her about referring her for a sleep study but she never followed through , she would like to now be referred for sleep study please advise please call on her cell phone

## 2019-06-19 ENCOUNTER — TELEPHONE (OUTPATIENT)
Dept: INTERNAL MEDICINE CLINIC | Age: 49
End: 2019-06-19

## 2019-08-15 ENCOUNTER — OFFICE VISIT (OUTPATIENT)
Dept: INTERNAL MEDICINE CLINIC | Age: 49
End: 2019-08-15

## 2019-08-15 VITALS
RESPIRATION RATE: 14 BRPM | HEART RATE: 90 BPM | OXYGEN SATURATION: 98 % | HEIGHT: 65 IN | DIASTOLIC BLOOD PRESSURE: 85 MMHG | WEIGHT: 234 LBS | TEMPERATURE: 98.8 F | SYSTOLIC BLOOD PRESSURE: 128 MMHG | BODY MASS INDEX: 38.99 KG/M2

## 2019-08-15 DIAGNOSIS — D64.9 CHRONIC ANEMIA: ICD-10-CM

## 2019-08-15 DIAGNOSIS — I10 PRIMARY HYPERTENSION: ICD-10-CM

## 2019-08-15 DIAGNOSIS — E66.01 SEVERE OBESITY (BMI 35.0-39.9) WITH COMORBIDITY (HCC): ICD-10-CM

## 2019-08-15 DIAGNOSIS — E78.5 DYSLIPIDEMIA: ICD-10-CM

## 2019-08-15 NOTE — PROGRESS NOTES
50 y.o. BLACK OR  female who presents for evaluation. She had one episode yesterday of mucousy stools. No associated abdominal pain, change in bowel habits, bleeding, fevers. She has not had any change in diet, recent antibiotic usage, has not eaten anything out of the ordinary. She really feels fine, has not had a bowel movement yet today. We discussed her tiredness and fatigue. She was supposed to get an opinion from Dr. Hakan Obrien who has since left for [facility], she has been rescheduled with a different provider.   She has follow-up blood work for next month    Past Medical History:   Diagnosis Date    Acanthosis nigricans     Anxiety     Asthma     Chronic anemia     Dr Danny Gaines; thalassemia    Depression     Diabetes mellitus ([facility] [address]     Heavy menses 3/12    s/p endometrial ablation 3/16    History of CVA (cerebrovascular accident)     no deficits    History of iron deficiency anemia     Hyperlipidemia     Hypertension     Hypovitaminosis D 10/11    Morbid obesity (HCC)     peak weight 215 lbs, bmi 38.1 from 6/15; dec dharmesh supp, med sup wt loss, bariatrics; IF 3/18 start weight 226 lbs but not doing    JOLIE (obstructive sleep apnea)     Dr. German Ansari cpap    Perennial allergic rhinitis      Past Surgical History:   Procedure Laterality Date    HX  SECTION      x2    HX COLONOSCOPY      Dr Danny Gaines 6/10 negative    HX GI  6/10    neg EGD Dr Morrissey  HX GYN      cyst removal    HX GYN      LEEP    HX HYSTEROSCOPY WITH ENDOMETRIAL ABLATION  3/16    Dr Shalonda Coker     Social History     Socioeconomic History    Marital status:      Spouse name: Not on file    Number of children: 2    Years of education: Not on file    Highest education level: Not on file   Occupational History    Occupation: works at [address] Financial resource strain: Not on file    Food insecurity:     Worry: Not on file     Inability: Not on file   [employer] needs: Medical: Not on file     Non-medical: Not on file   Tobacco Use    Smoking status: Never Smoker    Smokeless tobacco: Never Used   Substance and Sexual Activity    Alcohol use: Yes     Comment: social drinker    Drug use: No    Sexual activity: Not on file   Lifestyle    Physical activity:     Days per week: Not on file     Minutes per session: Not on file    Stress: Not on file   Relationships    Social connections:     Talks on phone: Not on file     Gets together: Not on file     Attends Jewish service: Not on file     Active member of club or organization: Not on file     Attends meetings of clubs or organizations: Not on file     Relationship status: Not on file    Intimate partner violence:     Fear of current or ex partner: Not on file     Emotionally abused: Not on file     Physically abused: Not on file     Forced sexual activity: Not on file   Other Topics Concern    Not on file   Social History Narrative    Not on file     Current Outpatient Medications   Medication Sig    candesartan (ATACAND) 16 mg tablet Take 1 Tab by mouth daily.  chlorthalidone (HYGROTEN) 25 mg tablet TAKE 1 TABLET BY MOUTH ONCE DAILY    exenatide microspheres (BYDUREON BCISE) 2 mg/0.85 mL atIn 2 mg by SubCUTAneous route every seven (7) days.  KLOR-CON M20 20 mEq tablet TAKE 1 TABLET BY MOUTH ONCE DAILY    repaglinide (PRANDIN) 2 mg tablet TAKE 1 TABLET BY MOUTH BEFORE BREAKFAST ,  LUNCH,  AND  DINNER    b complex vitamins (B COMPLEX 1) tablet Take 1 Tab by mouth daily.  budesonide-formoterol (SYMBICORT) 160-4.5 mcg/actuation HFAA Take 2 Puffs by inhalation two (2) times a day.  albuterol (PROVENTIL HFA, VENTOLIN HFA, PROAIR HFA) 90 mcg/actuation inhaler Take 1-2 Puffs by inhalation every four (4) hours as needed for Wheezing.     pioglitazone (ACTOS) 15 mg tablet TAKE 1 TABLET BY MOUTH ONCE DAILY    atorvastatin (LIPITOR) 20 mg tablet TAKE ONE TABLET BY MOUTH ONCE DAILY    VITAMIN D2 50,000 unit capsule TAKE ONE CAPSULE BY MOUTH EVERY 7 DAYS    citalopram (CELEXA) 20 mg tablet TAKE ONE TABLET BY MOUTH ONCE DAILY    amLODIPine (NORVASC) 10 mg tablet TAKE 1 TABLET BY MOUTH ONCE DAILY *CHANGE IN STRENGTH*    Blood-Glucose Meter monitoring kit Pt checks blood sugar once daily, Dx E11.9    Lancets misc Pt checks blood sugar once daily, Dx E11.9    glucose blood VI test strips (BLOOD GLUCOSE TEST) strip Pt checks blood sugar once daily, Dx e11.9    metFORMIN ER (GLUCOPHAGE XR) 500 mg tablet Take 2 Tabs by mouth daily (with dinner).  fluconazole (DIFLUCAN) 150 mg tablet TAKE ONE TABLET BY MOUTH AS A ONE-TIME DOSE    cloNIDine HCl (CATAPRES) 0.1 mg tablet Take 1 Tab by mouth two (2) times a day. (Patient taking differently: Take 0.1 mg by mouth two (2) times a day. Indications: NOT TAKING)    aspirin 81 mg chewable tablet Take 81 mg by mouth daily. Indications: NOT TAKING     No current facility-administered medications for this visit. Allergies   Allergen Reactions    Penicillins Unknown (comments)     Tongue swelling; difficulty swallowing; thrush    Compazine [Prochlorperazine] Unknown (comments)     Paranoia    Invokana [Canagliflozin] Other (comments)     Yeast infections     Visit Vitals  /85   Pulse 90   Temp 98.8 °F (37.1 °C) (Oral)   Resp 14   Ht 5' 5\" (1.651 m)   Wt 234 lb (106.1 kg)   SpO2 98%   BMI 38.94 kg/m²   A&O x3  Affect is appropriate. Mood stable  No apparent distress  Anicteric, no JVD, adenopathy or thyromegaly. No carotid bruits or radiated murmur  Lungs clear to auscultation, no wheezes or rales  Heart showed regular rate and rhythm. No murmur, rubs, gallops  Abdomen soft nontender, no hepatosplenomegaly or masses. Extremities without edema. Pulses 1-2+ symmetrically    Assessment and plan:  1. Transient mucousy stool. Long discussion, for now we will just observe and she will call back if with progressive symptoms or onset of new symptoms  2. Fatigue. Check labs as above pending evaluation by the sleep specialist        Above conditions discussed at length and patient vocalized understanding.   All questions answered to patient satisfaction

## 2019-08-15 NOTE — PROGRESS NOTES
Verónica Barney presents today for   Chief Complaint   Patient presents with    Other     one episode of mucus in stool. no pain or trouble having BMs    Fatigue     feeling fatigue last several weeks              Depression Screening:  No flowsheet data found. Learning Assessment:  Learning Assessment 9/15/2014   PRIMARY LEARNER Patient   HIGHEST LEVEL OF EDUCATION - PRIMARY LEARNER  SOME COLLEGE   BARRIERS PRIMARY LEARNER NONE   CO-LEARNER CAREGIVER No   PRIMARY LANGUAGE ENGLISH   LEARNER PREFERENCE PRIMARY LISTENING     READING   ANSWERED BY patient   RELATIONSHIP SELF       Abuse Screening:  Abuse Screening Questionnaire 12/17/2015   Do you ever feel afraid of your partner? N   Are you in a relationship with someone who physically or mentally threatens you? N   Is it safe for you to go home? Y       Fall Risk  No flowsheet data found. Coordination of Care:  1. Have you been to the ER, urgent care clinic since your last visit? Hospitalized since your last visit? NO    2. Have you seen or consulted any other health care providers outside of the [address] [place] [address] since your last visit? Include any pap smears or colon screening.  NO

## 2019-08-15 NOTE — LETTER
NOTIFICATION RETURN TO WORK / SCHOOL 
 
8/15/2019 12:00 PM 
 
Ms. Kirstie Stephens [address] [place] [address],[address] [facility] [id number] To Whom It May Concern: 
 
Kirstie Stephens is currently under the care of Kelley Denis. She will return to work on: 8/16/19 If there are questions or concerns please have the patient contact our office. Sincerely, Irena Ulloa MD

## 2019-08-24 DIAGNOSIS — G47.33 OSA (OBSTRUCTIVE SLEEP APNEA): ICD-10-CM

## 2019-08-24 RX ORDER — FLUCONAZOLE 150 MG/1
TABLET ORAL
Qty: 1 TAB | Refills: 1 | Status: SHIPPED | OUTPATIENT
Start: 2019-08-24 | End: 2019-11-27

## 2019-09-17 ENCOUNTER — HOSPITAL ENCOUNTER (OUTPATIENT)
Dept: LAB | Age: 49
Discharge: HOME | End: 2019-09-17

## 2019-09-17 LAB — SENTARA SPECIMEN COL,SENBCF: NORMAL

## 2019-09-17 PROCEDURE — 99001 SPECIMEN HANDLING PT-LAB: CPT

## 2019-09-18 LAB
A-G RATIO,AGRAT: 1.3 RATIO (ref 1.1–2.6)
ALBUMIN SERPL-MCNC: 4.6 G/DL (ref 3.5–5)
ALP SERPL-CCNC: 86 U/L (ref 25–115)
ALT SERPL-CCNC: 21 U/L (ref 5–40)
ANION GAP SERPL CALC-SCNC: 18 MMOL/L
AST SERPL W P-5'-P-CCNC: 18 U/L (ref 10–37)
AVG GLU, 10930: 182 MG/DL (ref 91–123)
BILIRUB SERPL-MCNC: 0.3 MG/DL (ref 0.2–1.2)
BUN SERPL-MCNC: 10 MG/DL (ref 6–22)
CALCIUM SERPL-MCNC: 9.9 MG/DL (ref 8.4–10.5)
CHLORIDE SERPL-SCNC: 93 MMOL/L (ref 98–110)
CHOLEST SERPL-MCNC: 153 MG/DL (ref 110–200)
CO2 SERPL-SCNC: 27 MMOL/L (ref 20–32)
CREAT SERPL-MCNC: 0.6 MG/DL (ref 0.5–1.2)
CREATININE, URINE: 43 MG/DL
ERYTHROCYTE [DISTWIDTH] IN BLOOD BY AUTOMATED COUNT: 16.6 % (ref 10–15.5)
FOLATE,FOL: >20 NG/ML
GFRAA, 66117: >60
GFRNA, 66118: >60
GLOBULIN,GLOB: 3.6 G/DL (ref 2–4)
GLUCOSE SERPL-MCNC: 85 MG/DL (ref 70–99)
HBA1C MFR BLD HPLC: 8 % (ref 4.8–5.9)
HCT VFR BLD AUTO: 35 % (ref 35.1–48)
HDLC SERPL-MCNC: 3.7 MG/DL (ref 0–5)
HDLC SERPL-MCNC: 41 MG/DL (ref 40–59)
HGB BLD-MCNC: 10.6 G/DL (ref 11.7–16)
LDLC SERPL CALC-MCNC: 85 MG/DL (ref 50–99)
MCH RBC QN AUTO: 22 PG (ref 26–34)
MCHC RBC AUTO-ENTMCNC: 30 G/DL (ref 31–36)
MCV RBC AUTO: 72 FL (ref 80–95)
MICROALB/CREAT RATIO, 140286: 31.6 MCG/MG OF CREATININE (ref 0–30)
MICROALBUMIN,URINE RANDOM 140054: 13.6 MCG/ML (ref 0.1–17)
PLATELET # BLD AUTO: 277 K/UL (ref 140–440)
PMV BLD AUTO: 11.3 FL (ref 9–13)
POTASSIUM SERPL-SCNC: 3.3 MMOL/L (ref 3.5–5.5)
PROT SERPL-MCNC: 8.2 G/DL (ref 6.4–8.3)
RBC # BLD AUTO: 4.86 M/UL (ref 3.8–5.2)
SODIUM SERPL-SCNC: 138 MMOL/L (ref 133–145)
T4 FREE SERPL-MCNC: 1.1 NG/DL (ref 0.9–1.8)
TRIGL SERPL-MCNC: 138 MG/DL (ref 40–149)
TSH SERPL DL<=0.005 MIU/L-ACNC: 1.28 MCU/ML (ref 0.27–4.2)
VIT B12 SERPL-MCNC: >2000 PG/ML (ref 211–911)
VLDLC SERPL CALC-MCNC: 28 MG/DL (ref 8–30)
WBC # BLD AUTO: 3.2 K/UL (ref 4–11)

## 2019-09-23 NOTE — PROGRESS NOTES
50 y.o. BLACK OR  female who presents for evaluation. No cardiovascular complaints. She has not been exercising and did not join the dance class she had been planning to do. She has not been following her bp but no sx    Denies polyuria, polydipsia, nocturia, vision change. Not checking sugars at this time. Diet is poor again. Vitals 2019 8/15/2019   Weight 234 lb 234 lb     Vitals 3/22/2019 10/17/2018   Weight 227 lb 224 lb     No gi or gu sfx.      No respiratory complaints    Past Medical History:   Diagnosis Date    Acanthosis nigricans     Anxiety     Asthma     Chronic anemia     Dr Anand Barboza; thalassemia    Depression     Diabetes mellitus ([facility] [address]     Heavy menses 3/12    s/p endometrial ablation 3/16    History of CVA (cerebrovascular accident)     no deficits    History of iron deficiency anemia     from menorrhagia    Hyperlipidemia     Hypertension     Hypovitaminosis D 10/11    Morbid obesity ([facility] [address]     dec dharmesh supp, med sup wt loss, bariatrics; IF 3/18 start weight 226 lbs but not doing    JOLIE (obstructive sleep apnea)     Dr. Renae Mad cpap    Perennial allergic rhinitis      Past Surgical History:   Procedure Laterality Date    HX  SECTION      x2    HX COLONOSCOPY      Dr Anand Barboza 6/10 negative    HX GI  6/10    neg EGD Dr Radha Espinoza HX GYN      cyst removal    HX GYN      LEEP    HX HYSTEROSCOPY WITH ENDOMETRIAL ABLATION  3/16    Dr Lidia Brewer     Social History     Socioeconomic History    Marital status:      Spouse name: Not on file    Number of children: 2    Years of education: Not on file    Highest education level: Not on file   Occupational History    Occupation: works at [address] Financial resource strain: Not on file    Food insecurity:     Worry: Not on file     Inability: Not on file   [employer] needs:     Medical: Not on file     Non-medical: Not on file   Tobacco Use    Smoking status: Never Smoker    Smokeless tobacco: Never Used   Substance and Sexual Activity    Alcohol use: Yes     Comment: social drinker    Drug use: No    Sexual activity: Not on file   Lifestyle    Physical activity:     Days per week: Not on file     Minutes per session: Not on file    Stress: Not on file   Relationships    Social connections:     Talks on phone: Not on file     Gets together: Not on file     Attends [employer] service: Not on file     Active member of club or organization: Not on file     Attends meetings of clubs or organizations: Not on file     Relationship status: Not on file    Intimate partner violence:     Fear of current or ex partner: Not on file     Emotionally abused: Not on file     Physically abused: Not on file     Forced sexual activity: Not on file   Other Topics Concern    Not on file   Social History Narrative    Not on file     Current Outpatient Medications   Medication Sig    glimepiride (AMARYL) 4 mg tablet Take 1 Tab by mouth every morning.  spironolactone (ALDACTONE) 25 mg tablet Take 1 Tab by mouth daily.  fluconazole (DIFLUCAN) 150 mg tablet TAKE 1 TABLET BY MOUTH AS A ONE TIME DOSE    candesartan (ATACAND) 16 mg tablet Take 1 Tab by mouth daily.  chlorthalidone (HYGROTEN) 25 mg tablet TAKE 1 TABLET BY MOUTH ONCE DAILY    exenatide microspheres (BYDUREON BCISE) 2 mg/0.85 mL atIn 2 mg by SubCUTAneous route every seven (7) days.  KLOR-CON M20 20 mEq tablet TAKE 1 TABLET BY MOUTH ONCE DAILY    b complex vitamins (B COMPLEX 1) tablet Take 1 Tab by mouth daily.  budesonide-formoterol (SYMBICORT) 160-4.5 mcg/actuation HFAA Take 2 Puffs by inhalation two (2) times a day.  albuterol (PROVENTIL HFA, VENTOLIN HFA, PROAIR HFA) 90 mcg/actuation inhaler Take 1-2 Puffs by inhalation every four (4) hours as needed for Wheezing.     pioglitazone (ACTOS) 15 mg tablet TAKE 1 TABLET BY MOUTH ONCE DAILY    atorvastatin (LIPITOR) 20 mg tablet TAKE ONE TABLET BY MOUTH ONCE DAILY    VITAMIN D2 50,000 unit capsule TAKE ONE CAPSULE BY MOUTH EVERY 7 DAYS    citalopram (CELEXA) 20 mg tablet TAKE ONE TABLET BY MOUTH ONCE DAILY    amLODIPine (NORVASC) 10 mg tablet TAKE 1 TABLET BY MOUTH ONCE DAILY *CHANGE IN STRENGTH*    Blood-Glucose Meter monitoring kit Pt checks blood sugar once daily, Dx E11.9    Lancets misc Pt checks blood sugar once daily, Dx E11.9    glucose blood VI test strips (BLOOD GLUCOSE TEST) strip Pt checks blood sugar once daily, Dx e11.9    metFORMIN ER (GLUCOPHAGE XR) 500 mg tablet Take 2 Tabs by mouth daily (with dinner).  aspirin 81 mg chewable tablet Take 81 mg by mouth daily. Indications: NOT TAKING     No current facility-administered medications for this visit. Allergies   Allergen Reactions    Penicillins Unknown (comments)     Tongue swelling; difficulty swallowing; thrush    Compazine [Prochlorperazine] Unknown (comments)     Paranoia    Invokana [Canagliflozin] Other (comments)     Yeast infections     REVIEW OF SYSTEMS: Dr Monserrat Castro 2017, mammo 2/13, sees [facility], colo 2010  Ophtho  no vision change or eye pain  Oral  no mouth pain, tongue or tooth problems  Ears  no hearing loss, ear pain, fullness, no swallowing problems  Cardiac  no CP, PND, orthopnea, edema, palpitations or syncope  Chest  no breast masses  Resp  no wheezing, chronic coughing, dyspnea  GI  no heartburn, nausea, vomiting, change in bowel habits, bleeding, hemorrhoids  Urinary  no dysuria, hematuria, flank pain, urgency, frequency    Visit Vitals  BP (!) 148/96   Pulse 97   Temp 98.1 °F (36.7 °C) (Oral)   Resp 14   Ht 5' 5\" (1.651 m)   Wt 234 lb (106.1 kg)   SpO2 98%   BMI 38.94 kg/m²     A&O x3  Affect is appropriate. Mood stable  No apparent distress  Anicteric, no JVD, adenopathy or thyromegaly. No carotid bruits or radiated murmur  Lungs clear to auscultation, no wheezes or rales  Heart showed regular rate and rhythm.  No murmur, rubs, gallops  Abdomen soft nontender, no hepatosplenomegaly or masses. Extremities without edema.   Pulses 2+    LABS  From 10/11 showed                   hba1c 11.1, ldl-p 2182, chol 166, tg 139, hdl 39, ldl-c 99,                  vit d 16.5  From 12/11 showed gluc 111, cr 0.68,              alt10,                      ldl-p 1980, chol 178, tg 104, hdl 40, ldl-c 117  From 3/12 showed                   hba1c 6.4,                   wbc 4.4, hb 11.0, plt 306,             vit d 41.6, %sat 10, ferritin 20, spep neg, fol 12.5, b12 665  From 11/12 showed gluc 196, cr 0.50,              alt 27, hba1c 9.1,   ldl-p 1745, chol 154, tg 146, hdl 41, ldl-c 84  From 7/13 showed   gluc 157, cr 0.50, gfr>60, alt 18, hba1c 8.8,   ldl-p 1017, chol 119, tg 102, hdl 39, ldl-c 60  From 8/14 showed   gluc 137, cr 0.50, gfr>60, alt 15, hba1c 7.3,       chol 118, tg 92,   hdl 34, ldl-c 66,   wbc 3.1, hb 8.9,   plt 236, umar 17.6  From 12/15 showed gluc 124, cr 0.60, gfr>60, alt 23, hba1c 6.7,       chol 120, tg 161, hdl 34, ldl-c 54,   wbc 4.5, hb 10.2, plt 274,            vit d 13.9, tsh 2.12  From 5/16 showed   gluc 121, cr 0.70, gfr>60, alt 27  From 7/16 showed        hba1c 7.4,       chol 134, tg 127, hdl 40, ldl-c 69  From 11/17 showed gluc 260, cr 1.06, gfr>60, alt 32, hba1c 9.1,       chol 143, tg 180, hdl 33, ldl-c 74,   wbc 3.5, hb 10.4, plt 245, umar 72.5, vit d 12.3  From 3/18 showed   gluc 182, cr 0.60, gfr>60,    hba1c 8.9,       chol 163, tg 105, hdl 41, ldl-c 101, wbc 3.6, hb 10.5, plt 276, camacho 70.2,  vit d 19.2  From 10/18 showed gluc 206, cr 1.25, gfr 56,                         ck/trop neg, ua neg  From 3/19 showed   gluc 85,   cr 0.60, gfr>60,     hba1c 6.8,        chol 149, tg 100, hdl 45, ldl-c 84,       umar 60.8, vit d 37.0  From 9/19 showed   gluc 85,   cr 0.60, gfr>60,     hba1c 8.0,       chol 153, tg 198, hdl 41, ldl-c 85, wbc 3.2, hb 10.6, plt 277, umar 31.6, tsh 1.28, ft4 1.10, b12>2k, fol>20    Patient Active Problem List   Diagnosis Code    Asthma J45.909    JOLIE and PLMD Dr. Vivienne Mills 2005 G47.33    Hypovitaminosis D E55.9    Dyslipidemia E78.5    Primary hypertension I10    Uncontrolled type 2 diabetes mellitus with microalbuminuria E11.29, E11.65, R80.9    Severe obesity (BMI 35.0-39. 9) with comorbidity (HCC) E66.01    Mild episode of recurrent major depressive disorder ([facility] [address] F33.0    Anxiety F41.9    Chronic anemia D64.9     Assessment and plan:  1. Diabetes. Long discussion. Will schedule with diabetic educator. Discussed adding either glp or insulin but she declined. Will change prandin to glimeperide, f/u 4 mos  2. Hyperlipidemia. Continue current regimen. 3. Hypovitaminosis D. Supplementation  4. Sleep apnea. F/u Dr. Vivienne Mills  5. Morbid obesity. Lifestyle and dietary measures. Portion control reiterated. 6. Hypertension. Will add aldactone especially with the low k  7. Asthma. Continue current regimen. 8. Anemia. F/U Dr Joselyn Sanchez prn  9. F/U gyn, mammo         RTC 9/19    Above conditions discussed at length and patient vocalized understanding.   All questions answered to patient satisfaction

## 2019-09-27 ENCOUNTER — OFFICE VISIT (OUTPATIENT)
Dept: INTERNAL MEDICINE CLINIC | Age: 49
End: 2019-09-27

## 2019-09-27 DIAGNOSIS — E66.01 SEVERE OBESITY (BMI 35.0-39.9) WITH COMORBIDITY (HCC): ICD-10-CM

## 2019-09-27 DIAGNOSIS — I10 PRIMARY HYPERTENSION: ICD-10-CM

## 2019-09-27 DIAGNOSIS — G47.33 OSA (OBSTRUCTIVE SLEEP APNEA): ICD-10-CM

## 2019-09-27 DIAGNOSIS — F33.0 MILD EPISODE OF RECURRENT MAJOR DEPRESSIVE DISORDER (HCC): ICD-10-CM

## 2019-09-27 DIAGNOSIS — E55.9 HYPOVITAMINOSIS D: ICD-10-CM

## 2019-09-27 DIAGNOSIS — D64.9 CHRONIC ANEMIA: ICD-10-CM

## 2019-09-27 DIAGNOSIS — E87.6 HYPOKALEMIA: ICD-10-CM

## 2019-09-27 DIAGNOSIS — E78.5 DYSLIPIDEMIA: ICD-10-CM

## 2019-09-27 NOTE — PROGRESS NOTES
Yuri Bedolla presents today for   Chief Complaint   Patient presents with    Physical     labs              Depression Screening:  3 most recent PHQ Screens 9/27/2019   Little interest or pleasure in doing things Not at all   Feeling down, depressed, irritable, or hopeless Not at all   Total Score PHQ 2 0       Learning Assessment:  Learning Assessment 9/15/2014   PRIMARY LEARNER Patient   HIGHEST LEVEL OF EDUCATION - PRIMARY LEARNER  SOME COLLEGE   BARRIERS PRIMARY LEARNER NONE   CO-LEARNER CAREGIVER No   PRIMARY LANGUAGE ENGLISH   LEARNER PREFERENCE PRIMARY LISTENING     READING   ANSWERED BY patient   RELATIONSHIP SELF       Abuse Screening:  Abuse Screening Questionnaire 9/27/2019   Do you ever feel afraid of your partner? N   Are you in a relationship with someone who physically or mentally threatens you? N   Is it safe for you to go home? Y       Fall Risk  No flowsheet data found. Coordination of Care:  1. Have you been to the ER, urgent care clinic since your last visit? Hospitalized since your last visit? no    2. Have you seen or consulted any other health care providers outside of the [address] [place] [address] since your last visit? Include any pap smears or colon screening.  no

## 2019-09-28 VITALS
TEMPERATURE: 98.1 F | HEIGHT: 65 IN | OXYGEN SATURATION: 98 % | DIASTOLIC BLOOD PRESSURE: 96 MMHG | WEIGHT: 234 LBS | RESPIRATION RATE: 14 BRPM | HEART RATE: 97 BPM | SYSTOLIC BLOOD PRESSURE: 148 MMHG | BODY MASS INDEX: 38.99 KG/M2

## 2019-09-28 RX ORDER — GLIMEPIRIDE 4 MG/1
4 TABLET ORAL
Qty: 90 TAB | Refills: 3 | Status: SHIPPED | OUTPATIENT
Start: 2019-09-27 | End: 2020-11-06

## 2019-09-28 RX ORDER — SPIRONOLACTONE 25 MG/1
25 TABLET ORAL DAILY
Qty: 30 TAB | Refills: 11 | Status: SHIPPED | OUTPATIENT
Start: 2019-09-28 | End: 2020-11-06

## 2019-09-30 ENCOUNTER — TELEPHONE (OUTPATIENT)
Dept: INTERNAL MEDICINE CLINIC | Age: 49
End: 2019-09-30

## 2019-09-30 NOTE — TELEPHONE ENCOUNTER
[address] requesting a call back today. [facility] stated that they have faxed over a drug interaction multiple times with no response. They are wanting to know if the patient should continue taking the potassium due to it interacts with the Aldactone.

## 2019-11-12 DIAGNOSIS — G47.33 OSA (OBSTRUCTIVE SLEEP APNEA): ICD-10-CM

## 2019-11-12 RX ORDER — FLUCONAZOLE 150 MG/1
TABLET ORAL
Qty: 1 TAB | Refills: 1 | Status: SHIPPED | OUTPATIENT
Start: 2019-11-12 | End: 2019-11-27

## 2019-11-18 ENCOUNTER — TELEPHONE (OUTPATIENT)
Dept: INTERNAL MEDICINE CLINIC | Age: 49
End: 2019-11-18

## 2019-11-18 RX ORDER — AMLODIPINE BESYLATE 5 MG/1
TABLET ORAL
Qty: 90 TAB | Refills: 3 | Status: SHIPPED | OUTPATIENT
Start: 2019-11-18 | End: 2020-02-24 | Stop reason: SDUPTHER

## 2019-11-18 NOTE — TELEPHONE ENCOUNTER
Pt stated she went to [facility] for Sciatic Nerve pain and was given prednisone. Since starting the prednisone she has been shaky, weak, N&V, increase in thirst, increase in HR, palpitations, irritable and having to use urinate more frequently. Patient was advised to report back to [facility] for evaluation.

## 2019-11-25 ENCOUNTER — TELEPHONE (OUTPATIENT)
Dept: INTERNAL MEDICINE CLINIC | Age: 49
End: 2019-11-25

## 2019-11-25 DIAGNOSIS — B37.0 THRUSH: Primary | ICD-10-CM

## 2019-11-25 RX ORDER — CANDESARTAN 16 MG/1
TABLET ORAL
Qty: 30 TAB | Refills: 5 | Status: SHIPPED | OUTPATIENT
Start: 2019-11-25 | End: 2020-06-11

## 2019-11-25 NOTE — TELEPHONE ENCOUNTER
Pt requesting RX for suspected thrush, has a white tongue and bad taste in her mouth. Has been on Prednisone recently.   Send to Freddie at [facility]

## 2019-11-27 RX ORDER — FLUCONAZOLE 100 MG/1
100 TABLET ORAL DAILY
Qty: 7 TAB | Refills: 0 | Status: SHIPPED | OUTPATIENT
Start: 2019-11-27 | End: 2019-12-04

## 2019-11-27 RX ORDER — METFORMIN HYDROCHLORIDE 500 MG/1
TABLET, EXTENDED RELEASE ORAL
Qty: 180 TAB | Refills: 3 | Status: SHIPPED | OUTPATIENT
Start: 2019-11-27 | End: 2020-02-24 | Stop reason: SDUPTHER

## 2019-12-04 ENCOUNTER — OFFICE VISIT (OUTPATIENT)
Dept: INTERNAL MEDICINE CLINIC | Age: 49
End: 2019-12-04

## 2019-12-04 VITALS
TEMPERATURE: 98.6 F | DIASTOLIC BLOOD PRESSURE: 80 MMHG | BODY MASS INDEX: 37.99 KG/M2 | RESPIRATION RATE: 14 BRPM | OXYGEN SATURATION: 99 % | HEART RATE: 97 BPM | SYSTOLIC BLOOD PRESSURE: 132 MMHG | WEIGHT: 228 LBS | HEIGHT: 65 IN

## 2019-12-04 DIAGNOSIS — M54.31 RIGHT SIDED SCIATICA: Primary | ICD-10-CM

## 2019-12-04 RX ORDER — GABAPENTIN 100 MG/1
100 CAPSULE ORAL 3 TIMES DAILY
Qty: 90 CAP | Refills: 2 | Status: SHIPPED | OUTPATIENT
Start: 2019-12-04 | End: 2020-01-21 | Stop reason: DRUGHIGH

## 2019-12-04 NOTE — PROGRESS NOTES
Jacinta Christianson presents today for   Chief Complaint   Patient presents with    Leg Pain     right back of thigh pain. nerve pain. burning senstation. heart fluttering              Depression Screening:  3 most recent PHQ Screens 9/27/2019   Little interest or pleasure in doing things Not at all   Feeling down, depressed, irritable, or hopeless Not at all   Total Score PHQ 2 0       Learning Assessment:  Learning Assessment 9/15/2014   PRIMARY LEARNER Patient   HIGHEST LEVEL OF EDUCATION - PRIMARY LEARNER  SOME COLLEGE   BARRIERS PRIMARY LEARNER NONE   CO-LEARNER CAREGIVER No   PRIMARY LANGUAGE ENGLISH   LEARNER PREFERENCE PRIMARY LISTENING     READING   ANSWERED BY patient   RELATIONSHIP SELF       Abuse Screening:  Abuse Screening Questionnaire 9/27/2019   Do you ever feel afraid of your partner? N   Are you in a relationship with someone who physically or mentally threatens you? N   Is it safe for you to go home? Y       Fall Risk  No flowsheet data found. Coordination of Care:  1. Have you been to the ER, urgent care clinic since your last visit? Hospitalized since your last visit? YES [facility]    2. Have you seen or consulted any other health care providers outside of the [address] [place] [address] since your last visit? Include any pap smears or colon screening.  no

## 2019-12-05 NOTE — PROGRESS NOTES
50 y.o. BLACK OR  female who presents for evaluation. Since , she's been having 'sciatica' on the right side. She was on a family outing to [facility] and started having pain in the right buttock radiating to the right leg. Pain is intermittent, sometimes shoots all the way to the 3rd toe. Denied any numbness, tingling, weakness, saddle complaints, incontinence. She uses ibuprofen 800 bid most days and it helps minimally, also takes tylenol in combination occasionally. 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 She finally went to urgent care 19 and the dx'ed sciatica, no films done.   She was given prednisone which 'made her crazy' with jitteriness, nervousness, insomnia, palpitations, and her sugars went up unsurprisingly    Past Medical History:   Diagnosis Date    Acanthosis nigricans     Anxiety     Asthma     Chronic anemia     Dr Leticia Talavear; thalassemia    Depression     Diabetes mellitus ([address]     Heavy menses 3/12    s/p endometrial ablation 3/16    History of CVA (cerebrovascular accident)     no deficits    History of iron deficiency anemia     from menorrhagia    Hyperlipidemia     Hypertension     Hypovitaminosis D 10/11    Morbid obesity ([address]     dec dharmesh supp, med sup wt loss, bariatrics; IF 3/18 start weight 226 lbs but not doing    JOLIE (obstructive sleep apnea)     Dr. Vel Belcher cpap    Perennial allergic rhinitis      Past Surgical History:   Procedure Laterality Date    HX  SECTION      x2    HX COLONOSCOPY      Dr Leticia Talavera 6/10 negative    HX GI  6/10    neg EGD Dr Rock Brasher HX GYN      cyst removal    HX GYN      LEEP    HX HYSTEROSCOPY WITH ENDOMETRIAL ABLATION  3/16    Dr Shabana Lucero     Social History     Socioeconomic History    Marital status:      Spouse name: Not on file    Number of children: 2    Years of education: Not on file    Highest education level: Not on file   Occupational History    Occupation: works at [address] Financial resource strain: Not on file    Food insecurity:     Worry: Not on file     Inability: Not on file   [employer] needs:     Medical: Not on file     Non-medical: Not on file   Tobacco Use    Smoking status: Never Smoker    Smokeless tobacco: Never Used   Substance and Sexual Activity    Alcohol use: Yes     Comment: social drinker    Drug use: No    Sexual activity: Not on file   Lifestyle    Physical activity:     Days per week: Not on file     Minutes per session: Not on file    Stress: Not on file   Relationships    Social connections:     Talks on phone: Not on file     Gets together: Not on file     Attends [employer] service: Not on file     Active member of club or organization: Not on file     Attends meetings of clubs or organizations: Not on file     Relationship status: Not on file    Intimate partner violence:     Fear of current or ex partner: Not on file     Emotionally abused: Not on file     Physically abused: Not on file     Forced sexual activity: Not on file   Other Topics Concern    Not on file   Social History Narrative    Not on file     Current Outpatient Medications   Medication Sig    gabapentin (NEURONTIN) 100 mg capsule Take 1 Cap by mouth three (3) times daily. Max Daily Amount: 300 mg.    metFORMIN ER (GLUCOPHAGE XR) 500 mg tablet TAKE 2 TABLETS BY MOUTH ONCE DAILY WITH DINNER    candesartan (ATACAND) 16 mg tablet TAKE 1 TABLET BY MOUTH ONCE DAILY    amLODIPine (NORVASC) 5 mg tablet TAKE 1 TABLET BY MOUTH ONCE DAILY    glimepiride (AMARYL) 4 mg tablet Take 1 Tab by mouth every morning.  spironolactone (ALDACTONE) 25 mg tablet Take 1 Tab by mouth daily.     chlorthalidone (HYGROTEN) 25 mg tablet TAKE 1 TABLET BY MOUTH ONCE DAILY    exenatide microspheres (BYDUREON BCISE) 2 mg/0.85 mL atIn 2 mg by SubCUTAneous route every seven (7) days.    KLOR-CON M20 20 mEq tablet TAKE 1 TABLET BY MOUTH ONCE DAILY    b complex vitamins (B COMPLEX 1) tablet Take 1 Tab by mouth daily.  budesonide-formoterol (SYMBICORT) 160-4.5 mcg/actuation HFAA Take 2 Puffs by inhalation two (2) times a day.  albuterol (PROVENTIL HFA, VENTOLIN HFA, PROAIR HFA) 90 mcg/actuation inhaler Take 1-2 Puffs by inhalation every four (4) hours as needed for Wheezing.  pioglitazone (ACTOS) 15 mg tablet TAKE 1 TABLET BY MOUTH ONCE DAILY    atorvastatin (LIPITOR) 20 mg tablet TAKE ONE TABLET BY MOUTH ONCE DAILY    VITAMIN D2 50,000 unit capsule TAKE ONE CAPSULE BY MOUTH EVERY 7 DAYS    citalopram (CELEXA) 20 mg tablet TAKE ONE TABLET BY MOUTH ONCE DAILY    amLODIPine (NORVASC) 10 mg tablet TAKE 1 TABLET BY MOUTH ONCE DAILY *CHANGE IN STRENGTH*    Blood-Glucose Meter monitoring kit Pt checks blood sugar once daily, Dx E11.9    Lancets misc Pt checks blood sugar once daily, Dx E11.9    glucose blood VI test strips (BLOOD GLUCOSE TEST) strip Pt checks blood sugar once daily, Dx e11.9    metFORMIN ER (GLUCOPHAGE XR) 500 mg tablet Take 2 Tabs by mouth daily (with dinner).  aspirin 81 mg chewable tablet Take 81 mg by mouth daily. Indications: NOT TAKING     No current facility-administered medications for this visit. Allergies   Allergen Reactions    Penicillins Unknown (comments)     Tongue swelling; difficulty swallowing; thrush    Compazine [Prochlorperazine] Unknown (comments)     Paranoia    Invokana [Canagliflozin] Other (comments)     Yeast infections     Visit Vitals  /80   Pulse 97   Temp 98.6 °F (37 °C) (Oral)   Resp 14   Ht 5' 5\" (1.651 m)   Wt 228 lb (103.4 kg)   SpO2 99%   BMI 37.94 kg/m²   back showed no cvat, positive straight leg on the right, no clear soft tissue or bony tenderness. Mild tenderness right trochanter, no pain on rotation of the hip.   Pulses, soft touch, reflexes intact and symmetrical.  strnegth normal RLE    Assessment and plan:  1. Probable sciatica. Will get films. Declined another round of medrol, narcotics. She is willing to try gabapentin after discussing potential benefits and sfx, she will titrate up slowly as instructed. Will refer to spine also for opinion        Above conditions discussed at length and patient vocalized understanding.   All questions answered to patient satisfaction

## 2019-12-26 ENCOUNTER — HOSPITAL ENCOUNTER (OUTPATIENT)
Dept: GENERAL RADIOLOGY | Age: 49
Discharge: HOME | End: 2019-12-26
Payer: COMMERCIAL

## 2019-12-26 DIAGNOSIS — M54.31 RIGHT SIDED SCIATICA: ICD-10-CM

## 2019-12-26 PROCEDURE — 72100 X-RAY EXAM L-S SPINE 2/3 VWS: CPT

## 2020-01-01 ENCOUNTER — TELEPHONE (OUTPATIENT)
Dept: INTERNAL MEDICINE CLINIC | Age: 50
End: 2020-01-01

## 2020-01-02 NOTE — TELEPHONE ENCOUNTER
pls call    FINDINGS: Five lumbar vertebra's are visualized in the alignment is normal. No  acute fracture or spondylolisthesis is seen. No vertebral body height loss or  intervertebral space narrowing is demonstrated. Visualized soft tissues are  unremarkable.     IMPRESSION  IMPRESSION:     1. No acute compression deformity or spondylolisthesis. Xray negative  Is she better?

## 2020-01-02 NOTE — TELEPHONE ENCOUNTER
Patient given message below. She verbalized understanding. Patient states that the gabapentin is helping with the pain and that she has an upcoming appointment scheduled to see the spine doctor. No further questions or concerns at this time.

## 2020-01-21 ENCOUNTER — OFFICE VISIT (OUTPATIENT)
Dept: ORTHOPEDIC SURGERY | Age: 50
End: 2020-01-21

## 2020-01-21 VITALS
DIASTOLIC BLOOD PRESSURE: 75 MMHG | RESPIRATION RATE: 20 BRPM | OXYGEN SATURATION: 98 % | TEMPERATURE: 97.8 F | HEART RATE: 93 BPM | BODY MASS INDEX: 38.95 KG/M2 | HEIGHT: 65 IN | WEIGHT: 233.8 LBS | SYSTOLIC BLOOD PRESSURE: 125 MMHG

## 2020-01-21 DIAGNOSIS — M54.16 LUMBAR RADICULOPATHY: Primary | ICD-10-CM

## 2020-01-21 RX ORDER — GABAPENTIN 300 MG/1
300 CAPSULE ORAL 3 TIMES DAILY
Qty: 90 CAP | Refills: 2 | Status: SHIPPED | OUTPATIENT
Start: 2020-01-21 | End: 2020-03-11 | Stop reason: ALTCHOICE

## 2020-01-21 NOTE — PATIENT INSTRUCTIONS
Gabapentin (Neurontin) 300 mg three times a day (TID) daily instructions: 
 
 
 Morning Afternoon Night Week 1 - - 1 pill Week 2 1 pill - 1 pill Week 3 and onwards 1 pill 1 pill 1 pill Week 1: Take one pill each night. Week 2: Take one pill in the morning and one pill at night. Week 3 and onwards: Take one pill in the morning, one pill in the afternoon, and one pill at night. Continue taking this dose each day.

## 2020-01-21 NOTE — PROGRESS NOTES
[address]  [address], [address]  [place]  Phone: [phone]  Fax: [phone]        Selena Jenkins  : 1970  PCP: Troy Wade MD  2020    NEW PATIENT      HISTORY OF PRESENT ILLNESS  Karen Forman is a 52 y.o. female c/o low back pain radiating into the posterior aspect of the RLE since 2019. Her symptoms began when she was at [facility] and they radiated all the way to her foot. More recently, her pain only radiates to the foot when it is flared up. Pt intolerant to PREDNISONE. PmHx: DM. Note from Dr. Jeramy Justin dated 19 indicating patient was seen with c/o 'sciatica' on the right side. She was on a family outing to [facility] and started having pain in the right buttock radiating to the right leg. She reported the pain was intermittent and sometimes radiates into the 3rd toe. She has used Ibuprofen 800 BID with minimal relief and occasionally uses Tylenol as well. She was seen by urgent care 19 where she was given the dx of sciatica. She was given PREDNISONE which 'made her crazy' with jitteriness, nervousness, insomnia, palpitations, and increase in blood sugars. She was prescribed Gabapentin 100 mg TID and it has provided some benefit. She rates her pain as a 7/10 today. ASSESSMENT  Her symptoms are likely due to a right L5 radiculopathy. On examination, she had a positive SLR on the R and no tenderness to palpation in the lumbar region or buttock. PLAN  1. Referral to PT [facility])  2. Increase Gabapentin to 300 mg TID. Patient advised to call the office if intolerant to higher dose. Pt will f/u in 6 weeks or sooner if needed. Diagnoses and all orders for this visit:    1. Lumbar radiculopathy  -     REFERRAL TO PHYSICAL THERAPY  -     gabapentin (NEURONTIN) 300 mg capsule; Take 1 Cap by mouth three (3) times daily. Max Daily Amount: 900 mg.        CHIEF COMPLAINT  Eric Hogue is seen today in consultation at the request of Casey Gaines MD for complaints of low back pain radiating into the RLE. PAST MEDICAL HISTORY   Past Medical History:   Diagnosis Date    Acanthosis nigricans     Anxiety     Asthma     Chronic anemia     Dr Curt Mcarthur; thalassemia    Depression     Diabetes mellitus ([facility] [address]     Heavy menses 3/12    s/p endometrial ablation 3/16    History of CVA (cerebrovascular accident)     no deficits    History of iron deficiency anemia     from menorrhagia    Hyperlipidemia     Hypertension     Hypovitaminosis D 10/11    Morbid obesity ([facility] [address]     dec dharmesh supp, med sup wt loss, bariatrics; IF 3/18 start weight 226 lbs but not doing    JOLIE (obstructive sleep apnea)     Dr. Johnson Ratel cpap    Perennial allergic rhinitis        Past Surgical History:   Procedure Laterality Date    HX  SECTION      x2    HX COLONOSCOPY      Dr Curt Mcarthur 6/10 negative    HX GI  6/10    neg EGD Dr Curt Mcarthur    HX GYN      cyst removal    HX GYN      LEEP    HX HYSTEROSCOPY WITH ENDOMETRIAL ABLATION  3/16    Dr Beto Corey       MEDICATIONS    Current Outpatient Medications   Medication Sig Dispense Refill    citalopram (CELEXA) 20 mg tablet TAKE 1 TABLET BY MOUTH ONCE DAILY 90 Tab 3    gabapentin (NEURONTIN) 100 mg capsule Take 1 Cap by mouth three (3) times daily. Max Daily Amount: 300 mg. 90 Cap 2    metFORMIN ER (GLUCOPHAGE XR) 500 mg tablet TAKE 2 TABLETS BY MOUTH ONCE DAILY WITH DINNER 180 Tab 3    candesartan (ATACAND) 16 mg tablet TAKE 1 TABLET BY MOUTH ONCE DAILY 30 Tab 5    amLODIPine (NORVASC) 5 mg tablet TAKE 1 TABLET BY MOUTH ONCE DAILY 90 Tab 3    glimepiride (AMARYL) 4 mg tablet Take 1 Tab by mouth every morning. 90 Tab 3    spironolactone (ALDACTONE) 25 mg tablet Take 1 Tab by mouth daily.  30 Tab 11    chlorthalidone (HYGROTEN) 25 mg tablet TAKE 1 TABLET BY MOUTH ONCE DAILY 90 Tab 3    exenatide microspheres (BYPOLO BCI) 2 mg/0.85 mL atIn 2 mg by SubCUTAneous route every seven (7) days. 4 Syringe 11    KLOR-CON M20 20 mEq tablet TAKE 1 TABLET BY MOUTH ONCE DAILY 90 Tab 3    b complex vitamins (B COMPLEX 1) tablet Take 1 Tab by mouth daily.  budesonide-formoterol (SYMBICORT) 160-4.5 mcg/actuation HFAA Take 2 Puffs by inhalation two (2) times a day. 1 Inhaler 11    albuterol (PROVENTIL HFA, VENTOLIN HFA, PROAIR HFA) 90 mcg/actuation inhaler Take 1-2 Puffs by inhalation every four (4) hours as needed for Wheezing. 1 Inhaler 11    pioglitazone (ACTOS) 15 mg tablet TAKE 1 TABLET BY MOUTH ONCE DAILY 90 Tab 3    atorvastatin (LIPITOR) 20 mg tablet TAKE ONE TABLET BY MOUTH ONCE DAILY 90 Tab 3    VITAMIN D2 50,000 unit capsule TAKE ONE CAPSULE BY MOUTH EVERY 7 DAYS 4 Cap 12    amLODIPine (NORVASC) 10 mg tablet TAKE 1 TABLET BY MOUTH ONCE DAILY *CHANGE IN STRENGTH* 90 Tab 0    Blood-Glucose Meter monitoring kit Pt checks blood sugar once daily, Dx E11.9 1 Kit 0    Lancets misc Pt checks blood sugar once daily, Dx E11.9 100 Each 3    glucose blood VI test strips (BLOOD GLUCOSE TEST) strip Pt checks blood sugar once daily, Dx e11.9 100 Strip 3    metFORMIN ER (GLUCOPHAGE XR) 500 mg tablet Take 2 Tabs by mouth daily (with dinner). 180 Tab 0    aspirin 81 mg chewable tablet Take 81 mg by mouth daily.  Indications: NOT TAKING         ALLERGIES  Allergies   Allergen Reactions    Penicillins Unknown (comments)     Tongue swelling; difficulty swallowing; thrush    Compazine [Prochlorperazine] Unknown (comments)     Paranoia    Invokana [Canagliflozin] Other (comments)     Yeast infections          SOCIAL HISTORY    Social History     Socioeconomic History    Marital status:      Spouse name: Not on file    Number of children: 2    Years of education: Not on file    Highest education level: Not on file   Occupational History    Occupation: works at [employer]    Smoking status: Never Smoker    Smokeless tobacco: Never Used   Substance and Sexual Activity    Alcohol use: Yes     Comment: social drinker    Drug use: No       FAMILY HISTORY  Family History   Problem Relation Age of Onset    Diabetes Mother          REVIEW OF SYSTEMS  Review of Systems   Musculoskeletal: Positive for back pain. RLE paraesthesia         PHYSICAL EXAMINATION  Visit Vitals  /75   Pulse 93   Temp 97.8 °F (36.6 °C)   Resp 20   Ht 5' 5\" (1.651 m)   Wt 233 lb 12.8 oz (106.1 kg)   SpO2 98%   BMI 38.91 kg/m²         Pain Assessment  1/21/2020   Location of Pain Back;Leg   Location Modifiers Right; Inferior   Severity of Pain 7   Quality of Pain Burning;Aching; Throbbing   Quality of Pain Comment \"pulling   Frequency of Pain Intermittent   Result of Injury No         Constitutional:  Well developed, well nourished, in no acute distress. Psychiatric: Affect and mood are appropriate. HEENT: Normocephalic, atraumatic. Extraocular movements intact. Integumentary: No rashes or abrasions noted on exposed areas. Cardiovascular: Regular rate and rhythm. Pulmonary: Clear to auscultation bilaterally. SPINE/MUSCULOSKELETAL EXAM    Cervical spine:  Neck is midline. Normal muscle tone. No focal atrophy is noted. ROM pain free. Shoulder ROM intact. No tenderness to palpation. Negative Spurling's sign. Negative Tinel's sign. Negative Sainz's sign. Sensation in the bilateral arms grossly intact to light touch. Lumbar spine:  No rash, ecchymosis, or gross obliquity. No fasciculations. No focal atrophy is noted. No pain with hip ROM. Full range of motion. No tenderness to palpation. No tenderness to palpation at the sciatic notch. SI joints non-tender. Trochanters non tender. Sensation in the bilateral legs grossly intact to light touch.     Positive Straight Leg raise on the R.       MOTOR:      Biceps  Triceps Deltoids Wrist Ext Wrist Flex Hand Intrin   Right 5/5 5/5 5/5 5/5 5/5 5/5   Left 5/5 5/5 5/5 5/5 5/5 5/5             Hip Flex  Quads Hamstrings Ankle DF EHL Ankle PF   Right 5/5 5/5 5/5 5/5 5/5 5/5   Left 5/5 5/5 5/5 5/5 5/5 5/5     DTRs are 2+ biceps, triceps, brachioradialis, patella, and Achilles. Squat not tested. No difficulty with tandem gait. Ambulation without assistive device. FWB. RADIOGRAPHS  Lumbar XR images taken on 12/26/19 personally reviewed with patient:  Five lumbar vertebra's are visualized in the alignment is normal. No  acute fracture or spondylolisthesis is seen. No vertebral body height loss or  intervertebral space narrowing is demonstrated. Visualized soft tissues are  unremarkable.     IMPRESSION  IMPRESSION:     1. No acute compression deformity or spondylolisthesis.  reviewed    Ms. Sharda Jordan has a reminder for a \"due or due soon\" health maintenance. I have asked that she contact her primary care provider for follow-up on this health maintenance. 17 minutes of face-to-face contact were spent with the patient during today's visit extensively discussing symptoms and treatment plan. All questions were answered. More than half of this visit today was spent on counseling. Written by Mariia Patino, as dictated by Dr. Dina Jennings. I, Dr. Dina Jnenings, confirm that all documentation is accurate.

## 2020-01-24 ENCOUNTER — TELEPHONE (OUTPATIENT)
Dept: INTERNAL MEDICINE CLINIC | Age: 50
End: 2020-01-24

## 2020-01-24 DIAGNOSIS — R05.9 COUGH: Primary | ICD-10-CM

## 2020-01-24 RX ORDER — HYDROCODONE POLISTIREX AND CHLORPHENIRAMINE POLISTIREX 10; 8 MG/5ML; MG/5ML
5 SUSPENSION, EXTENDED RELEASE ORAL
Qty: 100 ML | Refills: 0 | Status: SHIPPED | OUTPATIENT
Start: 2020-01-24 | End: 2020-02-03

## 2020-01-24 NOTE — TELEPHONE ENCOUNTER
Pt calling says she has a dry cough that she can't get rid of. Wants to know if RD can call in something for her. She has no fever or other symptoms. Says she works at [employer] and is coughing a lot, cant catch her breath to talk.     MariaD el Carmen Soto Dr.

## 2020-01-28 NOTE — PROGRESS NOTES
52 y.o. BLACK OR  female who presents for evaluation. No cardiovascular complaints. Still no exercise despite repeatedly planning on starting one    Denies polyuria, polydipsia, nocturia, vision change. Not checking sugars at this time. Diet is remains poor. She was asking if we could 'suction out the fat' in a joking manner. Admits that she really has just not been doing well with her mindset. We had referred her to the educator at the last visit but did not go    Vitals 2020   Weight 235 lb 3.2 oz 233 lb 12.8 oz 228 lb 234 lb   SpO2 98 98 99 98   BSA 2.21 m2 2.21 m2 2.18 m2 2.21 m2   BMI 39.14 kg/m2 38.91 kg/m2 37.94 kg/m2 38.94 kg/m2     No gi or gu sfx.      No respiratory complaints    She ended up seeing Dr Nohemy Acevedo earlier this month for L5 radiculopathy, did not tolerate the steroid well    Past Medical History:   Diagnosis Date    Acanthosis nigricans     Anxiety     Asthma     Chronic anemia     Dr Viktor Quesada; thalassemia    Depression     Diabetes mellitus ([facility] [address]     Heavy menses 3/12    s/p endometrial ablation 3/16    History of CVA (cerebrovascular accident)     no deficits    History of iron deficiency anemia     from menorrhagia    Hyperlipidemia     Hypertension     Hypovitaminosis D 10/11    Morbid obesity ([address]     dec dharmesh supp, med sup wt loss, bariatrics; IF 3/18 start weight 226 lbs but not doing    JOLIE (obstructive sleep apnea)     Dr. Robe Mack cpap    Perennial allergic rhinitis      Past Surgical History:   Procedure Laterality Date    HX  SECTION      x2    HX COLONOSCOPY      Dr Viktor Quesada 6/10 negative    HX GI  6/10    neg EGD Dr Constance Lucero HX GYN      cyst removal    HX GYN      LEEP    HX HYSTEROSCOPY WITH ENDOMETRIAL ABLATION  3/16    Dr Willie Norman     Social History     Socioeconomic History    Marital status:      Spouse name: Not on file    Number of children: 2    Years of education: Not on file    Highest education level: Not on file   Occupational History    Occupation: works at [employer] resource strain: Not on file    Food insecurity:     Worry: Not on file     Inability: Not on file   Parrable needs:     Medical: Not on file     Non-medical: Not on file   Tobacco Use    Smoking status: Never Smoker    Smokeless tobacco: Never Used   Substance and Sexual Activity    Alcohol use: Yes     Comment: social drinker    Drug use: No    Sexual activity: Not on file   Lifestyle    Physical activity:     Days per week: Not on file     Minutes per session: Not on file    Stress: Not on file   Relationships    Social connections:     Talks on phone: Not on file     Gets together: Not on file     Attends Orthodoxy service: Not on file     Active member of club or organization: Not on file     Attends meetings of clubs or organizations: Not on file     Relationship status: Not on file    Intimate partner violence:     Fear of current or ex partner: Not on file     Emotionally abused: Not on file     Physically abused: Not on file     Forced sexual activity: Not on file   Other Topics Concern    Not on file   Social History Narrative    Not on file     Current Outpatient Medications   Medication Sig    atorvastatin (LIPITOR) 20 mg tablet TAKE 1 TABLET BY MOUTH ONCE DAILY    gabapentin (NEURONTIN) 300 mg capsule Take 1 Cap by mouth three (3) times daily. Max Daily Amount: 900 mg.  citalopram (CELEXA) 20 mg tablet TAKE 1 TABLET BY MOUTH ONCE DAILY    metFORMIN ER (GLUCOPHAGE XR) 500 mg tablet TAKE 2 TABLETS BY MOUTH ONCE DAILY WITH DINNER    candesartan (ATACAND) 16 mg tablet TAKE 1 TABLET BY MOUTH ONCE DAILY    glimepiride (AMARYL) 4 mg tablet Take 1 Tab by mouth every morning.  spironolactone (ALDACTONE) 25 mg tablet Take 1 Tab by mouth daily.     chlorthalidone (HYGROTEN) 25 mg tablet TAKE 1 TABLET BY MOUTH ONCE DAILY    exenatide microspheres (BYPOLO BCISE) 2 mg/0.85 mL atIn 2 mg by SubCUTAneous route every seven (7) days.  KLOR-CON M20 20 mEq tablet TAKE 1 TABLET BY MOUTH ONCE DAILY    b complex vitamins (B COMPLEX 1) tablet Take 1 Tab by mouth daily.  budesonide-formoterol (SYMBICORT) 160-4.5 mcg/actuation HFAA Take 2 Puffs by inhalation two (2) times a day.  albuterol (PROVENTIL HFA, VENTOLIN HFA, PROAIR HFA) 90 mcg/actuation inhaler Take 1-2 Puffs by inhalation every four (4) hours as needed for Wheezing.  pioglitazone (ACTOS) 15 mg tablet TAKE 1 TABLET BY MOUTH ONCE DAILY    VITAMIN D2 50,000 unit capsule TAKE ONE CAPSULE BY MOUTH EVERY 7 DAYS    amLODIPine (NORVASC) 10 mg tablet TAKE 1 TABLET BY MOUTH ONCE DAILY *CHANGE IN STRENGTH*    Blood-Glucose Meter monitoring kit Pt checks blood sugar once daily, Dx E11.9    Lancets misc Pt checks blood sugar once daily, Dx E11.9    glucose blood VI test strips (BLOOD GLUCOSE TEST) strip Pt checks blood sugar once daily, Dx e11.9    metFORMIN ER (GLUCOPHAGE XR) 500 mg tablet Take 2 Tabs by mouth daily (with dinner).  chlorpheniramine-HYDROcodone (TUSSIONEX) 10-8 mg/5 mL suspension Take 5 mL by mouth every twelve (12) hours as needed for Cough for up to 10 days. Max Daily Amount: 10 mL.  amLODIPine (NORVASC) 5 mg tablet TAKE 1 TABLET BY MOUTH ONCE DAILY    aspirin 81 mg chewable tablet Take 81 mg by mouth daily. Indications: NOT TAKING     No current facility-administered medications for this visit.       Allergies   Allergen Reactions    Penicillins Unknown (comments)     Tongue swelling; difficulty swallowing; thrush    Compazine [Prochlorperazine] Unknown (comments)     Paranoia    Invokana [Canagliflozin] Other (comments)     Yeast infections     REVIEW OF SYSTEMS: Dr Mary Monk 2017, mammo 2/13, sees [facility], colo 2010  Ophtho - no vision change or eye pain  Oral - no mouth pain, tongue or tooth problems  Ears - no hearing loss, ear pain, fullness, no swallowing problems  Cardiac - no CP, PND, orthopnea, edema, palpitations or syncope  Chest - no breast masses  Resp - no wheezing, chronic coughing, dyspnea  GI - no heartburn, nausea, vomiting, change in bowel habits, bleeding, hemorrhoids  Urinary - no dysuria, hematuria, flank pain, urgency, frequency    Visit Vitals  /86 (BP 1 Location: Left arm, BP Patient Position: Sitting)   Pulse 100   Temp 98.2 °F (36.8 °C) (Oral)   Resp 15   Ht 5' 5\" (1.651 m)   Wt 235 lb 3.2 oz (106.7 kg)   SpO2 98%   BMI 39.14 kg/m²     A&O x3  Affect is appropriate. Mood stable  No apparent distress  Anicteric, no JVD, adenopathy or thyromegaly. No carotid bruits or radiated murmur  Lungs clear to auscultation, no wheezes or rales  Heart showed regular rate and rhythm. No murmur, rubs, gallops  Abdomen soft nontender, no hepatosplenomegaly or masses. Extremities without edema.   Pulses 2+    LABS  From 10/11 showed                   hba1c 11.1, ldl-p 2182, chol 166, tg 139, hdl 39, ldl-c 99,                  vit d 16.5  From 12/11 showed gluc 111, cr 0.68,              alt10,                     ldl-p 1980, chol 178, tg 104, hdl 40, ldl-c 117  From 3/12 showed                   hba1c 6.4,                   wbc 4.4, hb 11.0, plt 306,             vit d 41.6, %sat 10, ferritin 20, spep neg, fol 12.5, b12 665  From 11/12 showed gluc 196, cr 0.50,              alt 27, hba1c 9.1,  ldl-p 1745, chol 154, tg 146, hdl 41, ldl-c 84  From 7/13 showed   gluc 157, cr 0.50, gfr>60, alt 18, hba1c 8.8,   ldl-p 1017, chol 119, tg 102, hdl 39, ldl-c 60  From 8/14 showed   gluc 137, cr 0.50, gfr>60, alt 15, hba1c 7.3,       chol 118, tg 92,   hdl 34, ldl-c 66,   wbc 3.1, hb 8.9,   plt 236, umar 17.6  From 12/15 showed gluc 124, cr 0.60, gfr>60, alt 23, hba1c 6.7,       chol 120, tg 161, hdl 34, ldl-c 54,   wbc 4.5, hb 10.2, plt 274,             vit d 13.9, tsh 2.12  From 5/16 showed   gluc 121, cr 0.70, gfr>60, alt 27  From 7/16 showed        hba1c 7.4, chol 134, tg 127, hdl 40, ldl-c 69  From 11/17 showed gluc 260, cr 1.06, gfr>60, alt 32, hba1c 9.1,       chol 143, tg 180, hdl 33, ldl-c 74,   wbc 3.5, hb 10.4, plt 245, umar 72.5, vit d 12.3  From 3/18 showed   gluc 182, cr 0.60, gfr>60,    hba1c 8.9,       chol 163, tg 105, hdl 41, ldl-c 101, wbc 3.6, hb 10.5, plt 276, camacho  70.2,  vit d 19.2  From 10/18 showed gluc 206, cr 1.25, gfr 56,                          ck/trop neg, ua neg  From 3/19 showed   gluc 85,   cr 0.60, gfr>60,    hba1c 6.8,       chol 149, tg 100, hdl 45, ldl-c 84,             umar 60.8, vit d 37.0  From 9/19 showed   gluc 85,   cr 0.60, gfr>60,    hba1c 8.0,      chol 153, tg 198, hdl 41, ldl-c 85,   wbc 3.2, hb 10.6, plt 277, umar 31.6, tsh 1.28, ft4 1.10, b12>2k, fol>20    Results for orders placed or performed in visit on 01/31/20   AMB POC HEMOGLOBIN A1C   Result Value Ref Range    Hemoglobin A1c (POC) 8.7 %     Patient Active Problem List   Diagnosis Code    Asthma J45.909    JOLIE and PLMD Dr. Erin Tolliver 2005 G47.33    Hypovitaminosis D E55.9    Dyslipidemia E78.5    Primary hypertension I10    Uncontrolled type 2 diabetes mellitus with microalbuminuria E11.29, E11.65, R80.9    Severe obesity (BMI 35.0-39. 9) with comorbidity (HCC) E66.01    Mild episode of recurrent major depressive disorder ([facility] [address] F33.0    Anxiety F41.9    Chronic anemia D64.9     Assessment and plan:  1. Diabetes. Another baylee discussion. Went over various options but recommended starting basal regimen. She's not sure what she wants to do at this time. She is open to speaking to the pharmacist for medication and diabetic education consult so will do that first and have her return in 3-4 mos  2. Hyperlipidemia. Continue current regimen. 3. Hypovitaminosis D. Supplementation  4. Sleep apnea. F/u Dr. Erin Tolliver  5. Morbid obesity. Lifestyle and dietary measures. Portion control reiterated. We discussed bariatric options.   She'll look into coverage but for now is not inclined to pursue any surgical options  6. Hypertension. Continue current regimen. Check labs next visit  7. Asthma. Continue current regimen. 8. Anemia. F/U Dr Anibal dennis  9. F/U gyn, mammo         RTC 5/20    Above conditions discussed at length and patient vocalized understanding.   All questions answered to patient satisfaction    TOTAL time 40 min spent of which at least 30 min was on counseling, answering questions and coordination of care

## 2020-01-31 ENCOUNTER — OFFICE VISIT (OUTPATIENT)
Dept: INTERNAL MEDICINE CLINIC | Age: 50
End: 2020-01-31

## 2020-01-31 VITALS
HEIGHT: 65 IN | SYSTOLIC BLOOD PRESSURE: 131 MMHG | BODY MASS INDEX: 39.18 KG/M2 | HEART RATE: 100 BPM | DIASTOLIC BLOOD PRESSURE: 86 MMHG | WEIGHT: 235.2 LBS | RESPIRATION RATE: 15 BRPM | OXYGEN SATURATION: 98 % | TEMPERATURE: 98.2 F

## 2020-01-31 DIAGNOSIS — E78.5 DYSLIPIDEMIA: ICD-10-CM

## 2020-01-31 DIAGNOSIS — E66.01 SEVERE OBESITY (BMI 35.0-39.9) WITH COMORBIDITY (HCC): ICD-10-CM

## 2020-01-31 DIAGNOSIS — I10 PRIMARY HYPERTENSION: ICD-10-CM

## 2020-01-31 LAB — HBA1C MFR BLD HPLC: 8.7 %

## 2020-01-31 NOTE — PROGRESS NOTES
Imer Odom presents today for   Chief Complaint   Patient presents with    Hypertension     Patient here for 4 month follow up. Depression Screening:  3 most recent PHQ Screens 9/27/2019   Little interest or pleasure in doing things Not at all   Feeling down, depressed, irritable, or hopeless Not at all   Total Score PHQ 2 0       Learning Assessment:  Learning Assessment 9/15/2014   PRIMARY LEARNER Patient   HIGHEST LEVEL OF EDUCATION - PRIMARY LEARNER  SOME COLLEGE   BARRIERS PRIMARY LEARNER NONE   CO-LEARNER CAREGIVER No   PRIMARY LANGUAGE ENGLISH   LEARNER PREFERENCE PRIMARY LISTENING     READING   ANSWERED BY patient   RELATIONSHIP SELF       Abuse Screening:  Abuse Screening Questionnaire 9/27/2019   Do you ever feel afraid of your partner? N   Are you in a relationship with someone who physically or mentally threatens you? N   Is it safe for you to go home? Y       Fall Risk  No flowsheet data found. Coordination of Care:  1. Have you been to the ER, urgent care clinic since your last visit? Hospitalized since your last visit? No    2. Have you seen or consulted any other health care providers outside of the [address] [place] [address] since your last visit? Include any pap smears or colon screening. NO      Advance Directive:  1. Do you have an advance directive in place? Patient Reply:NO    2. If not, would you like material regarding how to put one in place?  Patient Reply: Luis Warner

## 2020-02-04 ENCOUNTER — HOSPITAL ENCOUNTER (OUTPATIENT)
Dept: PHYSICAL THERAPY | Age: 50
Discharge: HOME | End: 2020-02-04
Payer: COMMERCIAL

## 2020-02-04 PROCEDURE — 97161 PT EVAL LOW COMPLEX 20 MIN: CPT

## 2020-02-04 PROCEDURE — 97110 THERAPEUTIC EXERCISES: CPT

## 2020-02-04 PROCEDURE — 97535 SELF CARE MNGMENT TRAINING: CPT

## 2020-02-04 NOTE — PROGRESS NOTES
PT DAILY TREATMENT NOTE/LUMBAR EVAL 10-18    Patient Name: Malina Parsons  Date:2020  : 1970  [x]  Patient  Verified  Payor: Génesis Smith / Plan: [employer] / Product Type: HMO /    In time:5:08pm  Out time:5:53pm  Total Treatment Time (min): 45  Visit #: 1 of 4-8    Medicare/BCBS Only   Total Timed Codes (min):  25 1:1 Treatment Time:  45     Treatment Area: Low back pain [M54.5]  Radiculopathy, lumbar region [M54.16]  SUBJECTIVE  Pain Level (0-10 scale): 1/10 referring to post right thigh  [x]constant []intermittent []improving []worsening []no change since onset    Any medication changes, allergies to medications, adverse drug reactions, diagnosis change, or new procedure performed?: [x] No    [] Yes (see summary sheet for update)  Subjective functional status/changes:    Pt is a 52year old female who presents with reports of onset of LBP in 2019 on a day where she spent a lot of time walking. She reports increased pain since then in her right lower back and buttocks with intermittent referral of burning pain, tingling, and intermittent numbness sensations in her right post thigh, leg, and occasionally 2nd & 3rd toes of her right foot. She reports prolonged sitting tends to increase her pain, standing and walking helps somewhat, and notes that ibuprofen and gabapentin have decreased her symptom. She reports having an x-ray of her back with no acute findings noted.      PLOF: independent void of pain  Limitations to PLOF: pain in right lower back limiting sitting, lifting tasks, pain throughout day  Mechanism of Injury: see above, unsure  Current symptoms/Complaints: see above  Previous Treatment/Compliance: spine specialist referral, gabapentin, ibuprofen  PMHx/Surgical Hx: diabetic, hypertension, asthma  Work Hx: see intake, prolonged sitting at work  Living Situation:   Pt Goals: see intake  Barriers: []pain []financial []time []transportation []other  Motivation: appears motivated and cooperative  Substance use: []Alcohol []Tobacco []other:   FABQ Score: []low []elevate  Cognition: A & O x 4    Other:    OBJECTIVE/EXAMINATION  Domestic Life:   Activity/Recreational Limitations:   Mobility:   Self Care:     20 min [x]Eval                  []Re-Eval     15 min Therapeutic Exercise:  [] See flow sheet : HEP handout   Rationale: increase ROM to improve the patients ability to reduce pain and improve QOL. Self Care: 10 minutes pt education regarding relevant anatomy, diagnosis, prognosis, plan of care, activity modification, self modality use to facilitate pt self management. With   [] TE   [] TA   [] neuro   [] other: Patient Education: [x] Review HEP    [] Progressed/Changed HEP based on:   [] positioning   [] body mechanics   [] transfers   [] heat/ice application    [] other:      Other Objective/Functional Measures:    Physical Therapy Evaluation - Lumbar Spine ([facility])    SUBJECTIVE  Chief Complaint:    Mechanism of injury:    Symptoms:  Aggravated by:   [x] Bending [x] Sitting [] Standing [] Walking   [] Moving [] Cough [] Sneeze [] Valsalva   [] AM  [] PM  Lying:  [] sup   [] pro   [] sidelying   [] Other:     Eased by:    [] Bending [] Sitting [x] Standing [] Walking   [] Moving [] AM  [] PM  Lying: [] sup  [] pro  [] sidelying   [] Other:     General Health:  Red Flags Indicated? [] Yes    [x] No  [] Yes [] No Recent weight change (If yes, due to dieting?  [] Yes  [] No)   [] Yes [] No Weakness in legs during walking  [] Yes [] No Unremitting pain at night  [] Yes [] No Abdominal pain or problems  [] Yes [] No Rectal bleeding  [] Yes [] No Feet more cold or painful in cold weather  [] Yes [] No Menstrual irregularities  [] Yes [] No Blood or pain with urination  [] Yes [] No Dysfunction of bowel or bladder  [] Yes [] No Recent illness within past 3 weeks (i.e, cold, flu)  [] Yes [] No Numbness/tingling in buttock/genitalia region    Past History/Treatments:     Diagnostic Tests: [] Lab work [x] X-rays    [] CT [] MRI     [] Other:  Results: unremarkable per pt report    Functional Status  Prior level of function:  Present functional limitations:  What position do you sleep in?:    OBJECTIVE  Posture:  Lateral Shift: [] R    [] L     [] +  [] -  Kyphosis: [] Increased [] Decreased   []  WNL  Lordosis:  [x] Increased [] Decreased   [] WNL  Pelvic symmetry: [x] WNL    [] Other:    Gait:  [x] Normal     [] Abnormal:    Active Movements: [] N/A   [] Too acute   [] Other:  ROM % AROM % PROM Comments:pain, area   Forward flexion 40-60 Fingers to ankles  Increased right post thigh burning and tension   Extension 20-30 75%  No change in pain   SB right 20-30 80%  Increased pinching pain in right lower back   SB left 20-30 80%  Tension in right lower back @ end range   Rotation right 5-10 75%     Rotation left 5-10 75%       Repeated Movements   Effects on present pain: produces (OR), abolishes (A), increases (incr), decreases (decr), centralizes (C), peripheral (PH), no effect (NE)   Pre-Test Sx Flexion Repeated Flexion Extension Repeated Extension Repeated SBL Repeated SBR   Sitting          Standing          Lying      N/A N/A   Comments:  Side Glide:  Sustained passive positioning test:    Neuro Screen [x] WNL  Myotome/Dermatome/Reflexes:  Comments:    Dural Mobility:  SLR Sitting: [] R    [] L    [] +    [] -  @ (degrees):           Supine: [x] R    [] L    [x] +    [] -  @ (degrees):   Slump Test: [x] R    [] L    [x] +    [] -  @ (degrees):   Prone Knee Bend: [] R    [] L    [] +    [x] -     Palpation  [x] Min  [] Mod  [] Severe    Location: right PSIS and QL, right piriformis, non-concordant pain  [] Min  [] Mod  [] Severe    Location:  [] Min  [] Mod  [] Severe    Location:    Strength   L(0-5) R (0-5) N/T   Hip Flexion (L1,2) 5 5 []   Knee Extension (L3,4) 5 5 []   Ankle Dorsiflexion (L4) 5 5 []   Great Toe Extension (L5) 5 5 []   Ankle Plantarflexion (S1) 5 5 []   Knee Flexion (S1,2) 5 5 []   Upper Abdominals   []   Lower Abdominals   []   Paraspinals   []   Back Rotators   []   Gluteus Laureano 4 4 []   Other 4 4 []     Special Tests  Lumbar:  Lumb. Compression: [] Pos  [x] Neg               Lumbar Distraction:   [x] Pos  [] Neg    Quadrant:  [] Pos  [x] Neg   [] Flex  [] Ext    Sacroilliac:  Gaenslen's: [] R    [] L    [] +    [] -     Compression: [] +    [x] -     Gapping:  [] +    [x] -     Thigh Thrust: [] R    [] L    [] +    [x] -     Leg Length: [] +    [] -   Position:    Crests: level    ASIS: level    PSIS:    Sacral Sulcus:    Mobility: Standing flex:     Sitting flex:     Supine to sit:     Prone knee bend:         Hip: Sharl Flack:  [x] R    [x] L    [x] +    [] - limited hip mobility bilat void of pain provocation     Scour:  [] R    [] L    [] +    [x] -     Piriformis: [] R    [] L    [] +    [x] -          Deficits: Melecio's: [] R    [] L    [] +    [x] -     Clare Dyers: [] R    [] L    [] +    [x] -     Hamstrings 90/90: -35 right (dural tension), -10 left    Gastrocsoleus (to neutral): Right: Left:       Global Muscular Weakness:  Abdominals:  Quadratus Lumborum:  Paraspinals: Other:    Other tests/comments:  Discomfort in right buttock with end range prone right hip IR     Extension bias noted    Pain Level (0-10 scale) post treatment: 0    ASSESSMENT/Changes in Function: Per Petra 41    Patient will continue to benefit from skilled PT services to modify and progress therapeutic interventions, address functional mobility deficits, address ROM deficits, address strength deficits, analyze and address soft tissue restrictions, analyze and cue movement patterns and instruct in home and community integration to attain remaining goals. [x]  See Plan of Care  []  See progress note/recertification  []  See Discharge Summary         Progress towards goals / Updated goals:  Short Term Goals:  To be accomplished in 2 weeks:               1. Patient will report performance of HEP at least 2 times per day to facilitate improved outcomes and improved self management. Eval: issued     Long Term Goals: To be accomplished in 4 weeks:               1. Patient will report FOTO score of 66 or better to indicate significant improvement in functional status. Eval: 58               2. Pt will demonstrate trunk flexion AROM fingers to floor void of pain provocation to improve ease of daily activity. Eval: fingers to ankles with increased RLE pain               3. Pt will demonstrate (-) right unilateral SLR to indicate reduced dural irritation an improve QOL. Eval: (+)               4. Pt will demonstrate ability to perform squat with 20# KB x 10 void of pain to improve ease of daily tasks. Eval: not attempted    PLAN  []  Upgrade activities as tolerated     [x]  Continue plan of care  []  Update interventions per flow sheet       []  Discharge due to:_  [x]  Other:_neutral and extension biased interventions until pain subsides then progress loading and gradual return to flexion activities.       Kelley Horowitz, PT, DPT, ATC 2/4/2020  5:14 PM

## 2020-02-05 NOTE — PROGRESS NOTES
[facility]  [address]  [place], [address]  [phone] fax    Plan of Care/ Statement of Necessity for Physical Therapy Services    Patient name: Eric Hogue Start of Care: 2020   Referral source: Kelly Trimble MD : 1970    Medical Diagnosis: Low back pain [M54.5]  Radiculopathy, lumbar region [M54.16]  Payor: Indira Ley / Plan: Doretha Suraj HMO / Product Type: HMO /  Onset Date:2019    Treatment Diagnosis: LBP with RLE referred symptoms   Prior Hospitalization: see medical history Provider#: [id number]   Medications: Verified on Patient summary List    Comorbidities: diabetic, hypertension, asthma   Prior Level of Function: independent void of pain     The Plan of Care and following information is based on the information from the initial evaluation. Assessment/ key information: Pt is a 52year old female who presents with reports of onset of LBP in 2019 on a day where she spent a lot of time walking. She reports increased pain since then in her right lower back and buttocks with intermittent referral of burning pain, tingling, and intermittent numbness sensations in her right post thigh, leg, and occasionally 2nd & 3rd toes of her right foot. She reports prolonged sitting tends to increase her pain, standing and walking helps somewhat, and notes that ibuprofen and gabapentin have decreased her symptom. She reports having an x-ray of her back with no acute findings noted. Signs and symptoms appear possibly consistent with a right L/S radiculopathy with associated deficits in trunk ROM, (+) right dural tension signs, and pain limited trunk flexion. Pt also demonstrates some discomfort with end range hip IR. Pt will benefit from skilled PT management to address their impairments and improve their level of function.     Evaluation Complexity History MEDIUM  Complexity : 1-2 comorbidities / personal factors will impact the outcome/ POC ; Examination MEDIUM Complexity : 3 Standardized tests and measures addressing body structure, function, activity limitation and / or participation in recreation  ;Presentation MEDIUM Complexity : Evolving with changing characteristics  ; Clinical Decision Making MEDIUM Complexity : FOTO score of 26-74  Overall Complexity Rating: LOW   Problem List: pain affecting function, decrease ROM, decrease strength, decrease ADL/ functional abilitiies, decrease activity tolerance and decrease flexibility/ joint mobility   Treatment Plan may include any combination of the following: Therapeutic exercise, Therapeutic activities, Neuromuscular re-education, Physical agent/modality, Manual therapy, Aquatic therapy, Patient education and Self Care training  Patient / Family readiness to learn indicated by: asking questions, trying to perform skills and interest  Persons(s) to be included in education: patient (P)  Barriers to Learning/Limitations: None  Patient Goal (s): I would like to be fully mobile without worry of pain.   Patient Self Reported Health Status: fair  Rehabilitation Potential: good    Short Term Goals: To be accomplished in 2 weeks:   1. Patient will report performance of HEP at least 2 times per day to facilitate improved outcomes and improved self management. Long Term Goals: To be accomplished in 4 weeks:   1. Patient will report FOTO score of 66 or better to indicate significant improvement in functional status. 2. Pt will demonstrate trunk flexion AROM fingers to floor void of pain provocation to improve ease of daily activity. 3. Pt will demonstrate (-) right unilateral SLR to indicate reduced dural irritation an improve QOL. 4. Pt will demonstrate ability to perform squat with 20# KB x 10 void of pain to improve ease of daily tasks. Frequency / Duration: Patient to be seen 2 times per week for 4  weeks.     Patient/ Caregiver education and instruction: Diagnosis, prognosis, self care, activity modification and exercises   [x]  Plan of care has been reviewed with MADAI Pope, PT, DPT, ATC 2/4/2020 7:15 PM    ________________________________________________________________________    I certify that the above Therapy Services are being furnished while the patient is under my care. I agree with the treatment plan and certify that this therapy is necessary.     Physician's Signature:____________Date:_________TIME:________    ** Signature, Date and Time must be completed for valid certification **    Please sign and return to [facility] [address]  [address]  [place], [address]  [phone] fax

## 2020-02-12 DIAGNOSIS — E78.5 DYSLIPIDEMIA: ICD-10-CM

## 2020-02-12 DIAGNOSIS — I10 PRIMARY HYPERTENSION: ICD-10-CM

## 2020-02-14 DIAGNOSIS — I10 PRIMARY HYPERTENSION: ICD-10-CM

## 2020-02-15 DIAGNOSIS — D64.9 CHRONIC ANEMIA: ICD-10-CM

## 2020-02-17 ENCOUNTER — TELEPHONE (OUTPATIENT)
Dept: INTERNAL MEDICINE CLINIC | Age: 50
End: 2020-02-17

## 2020-02-17 NOTE — TELEPHONE ENCOUNTER
Called patient to schedule appointment for Diabetes education and management per referral from Dr. Mackenzie Austin. No answer, left voicemail requesting patient return call to PharmD. Will continue to attempt to reach patient.     Good Escoto, PharmD  Clinical Pharmacist Specialist

## 2020-02-20 ENCOUNTER — TELEPHONE (OUTPATIENT)
Dept: FAMILY MEDICINE CLINIC | Age: 50
End: 2020-02-20

## 2020-02-20 NOTE — TELEPHONE ENCOUNTER
Called patient to schedule appointment for PharmD diabetes education and management per referral from Dr. Radhika Armenta. Patient requests a callback tomorrow to schedule PharmD appointment.     Will call patient back tomorrow per her request.    Ana Luisa Small, Harshal  Clinical Pharmacist Specialist

## 2020-02-21 ENCOUNTER — TELEPHONE (OUTPATIENT)
Dept: INTERNAL MEDICINE CLINIC | Age: 50
End: 2020-02-21

## 2020-02-21 NOTE — TELEPHONE ENCOUNTER
Called patient to schedule appointment for PharmD diabetes education and management per referral from Dr. Radhika Armenta. Scheduled patient for PharmD appointment on 3/11/2020 at 3:30 PM at [facility]. Instructed patient to bring medications and blood sugar monitor to appointment. Patient expressed understanding and had no further questions.     Ana Luisa Small, PharmD  Clinical Pharmacist Specialist

## 2020-02-24 ENCOUNTER — OFFICE VISIT (OUTPATIENT)
Dept: ORTHOPEDIC SURGERY | Age: 50
End: 2020-02-24

## 2020-02-24 VITALS
WEIGHT: 236.4 LBS | DIASTOLIC BLOOD PRESSURE: 73 MMHG | TEMPERATURE: 97.8 F | SYSTOLIC BLOOD PRESSURE: 121 MMHG | OXYGEN SATURATION: 100 % | HEART RATE: 93 BPM | HEIGHT: 65 IN | BODY MASS INDEX: 39.39 KG/M2 | RESPIRATION RATE: 28 BRPM

## 2020-02-24 DIAGNOSIS — M54.16 LUMBAR RADICULOPATHY: Primary | ICD-10-CM

## 2020-02-24 RX ORDER — PREGABALIN 150 MG/1
150 CAPSULE ORAL 2 TIMES DAILY
Qty: 60 CAP | Refills: 2 | Status: SHIPPED | OUTPATIENT
Start: 2020-02-24 | End: 2021-06-14

## 2020-02-24 NOTE — PROGRESS NOTES
[address]  [address], [address]  [place] [address]  Phone: [phone]  Fax: [phone]        Ham Aldrich  : 1970  PCP: Noris Liu MD  2020    PROGRESS NOTE      HISTORY OF PRESENT ILLNESS  Holley Veliz is a 52 y.o. female who was seen as a new patient 2020 with c/o low back pain radiating into the posterior aspect of the RLE since 2019. Her symptoms began when she was at [facility] and they radiated all the way to her foot. More recently, her pain only radiates to the foot when it is flared up. Pt intolerant to PREDNISONE. PmHx: DM. Note from Dr. Denys Cook dated 19 indicating patient was seen with c/o 'sciatica' on the right side. She was on a family outing to [facility] and started having pain in the right buttock radiating to the right leg. She reported the pain was intermittent and sometimes radiates into the 3rd toe. She has used Ibuprofen 800 BID with minimal relief and occasionally uses Tylenol as well. She was seen by urgent care 19 where she was given the dx of sciatica. She was given PREDNISONE which 'made her crazy' with jitteriness, nervousness, insomnia, palpitations, and increase in blood sugars. She was prescribed Gabapentin 100 mg TID and it has provided some benefit. Holley Veliz comes in to the office today for f/u. She attended PT (2020; [facility]). Pt notes that she c/o a new pain in her left thigh following dancing at a wedding and party, but it has begun to improve. Pt notes that she has seen some improvement of the RLE paraesthesia with the increased dose of GABAPENTIN, but it caused somnolence so she was unable to take it during the day. She rates her pain as a 6/10 today. ASSESSMENT  Her symptoms are likely due to a right L5 radiculopathy.  On examination, she had a positive SLR on the R and no tenderness to palpation in the lumbar region or buttock. PLAN  1. Lumbar MRI to r/o right L5 radiculopathy. 2. Lyrica 150 mg BID. 3. Continue PT/HEP. Pt will f/u after MRI or sooner as needed. Diagnoses and all orders for this visit:    1. Lumbar radiculopathy  -     MRI LUMB SPINE WO CONT; Future  -     pregabalin (LYRICA) 150 mg capsule; Take 1 Cap by mouth two (2) times a day. Max Daily Amount: 300 mg. PAST MEDICAL HISTORY   Past Medical History:   Diagnosis Date    Acanthosis nigricans     Anxiety     Asthma     Chronic anemia     Dr Mago Manning; thalassemia    Depression     Diabetes mellitus ([facility] [address]     Heavy menses 3/12    s/p endometrial ablation 3/16    History of CVA (cerebrovascular accident)     no deficits    History of iron deficiency anemia     from menorrhagia    Hyperlipidemia     Hypertension     Hypovitaminosis D 10/11    Morbid obesity ([facility] [address]     dec dharmesh supp, med sup wt loss, bariatrics; IF 3/18 start weight 226 lbs but not doing    JOLIE (obstructive sleep apnea)     Dr. Gómez Roque cpap    Perennial allergic rhinitis        Past Surgical History:   Procedure Laterality Date    HX  SECTION      x2    HX COLONOSCOPY      Dr Mago Manning 6/10 negative    HX GI  6/10    neg EGD Dr Mago Manning    HX GYN      cyst removal    HX GYN      LEEP    HX HYSTEROSCOPY WITH ENDOMETRIAL ABLATION  3/16    Dr Lela Smith   . MEDICATIONS    Current Outpatient Medications   Medication Sig Dispense Refill    gabapentin (NEURONTIN) 300 mg capsule Take 1 Cap by mouth three (3) times daily. Max Daily Amount: 900 mg.  (Patient taking differently: Take 300 mg by mouth as needed.) 90 Cap 2    citalopram (CELEXA) 20 mg tablet TAKE 1 TABLET BY MOUTH ONCE DAILY (Patient taking differently: Take 20 mg by mouth daily.) 90 Tab 3    candesartan (ATACAND) 16 mg tablet TAKE 1 TABLET BY MOUTH ONCE DAILY (Patient taking differently: Take 16 mg by mouth daily.) 30 Tab 5    chlorthalidone (HYGROTEN) 25 mg tablet TAKE 1 TABLET BY MOUTH ONCE DAILY (Patient taking differently: Take 25 mg by mouth daily.) 90 Tab 3    exenatide microspheres (BYDUREON BCISE) 2 mg/0.85 mL atIn 2 mg by SubCUTAneous route every seven (7) days. 4 Syringe 11    KLOR-CON M20 20 mEq tablet TAKE 1 TABLET BY MOUTH ONCE DAILY (Patient taking differently: Take  by mouth daily.) 90 Tab 3    budesonide-formoterol (SYMBICORT) 160-4.5 mcg/actuation HFAA Take 2 Puffs by inhalation two (2) times a day. 1 Inhaler 11    albuterol (PROVENTIL HFA, VENTOLIN HFA, PROAIR HFA) 90 mcg/actuation inhaler Take 1-2 Puffs by inhalation every four (4) hours as needed for Wheezing. 1 Inhaler 11    pioglitazone (ACTOS) 15 mg tablet TAKE 1 TABLET BY MOUTH ONCE DAILY (Patient taking differently: Take 30 mg by mouth daily.) 90 Tab 3    VITAMIN D2 50,000 unit capsule TAKE ONE CAPSULE BY MOUTH EVERY 7 DAYS (Patient taking differently: Take 50,000 Units by mouth every seven (7) days. ) 4 Cap 12    amLODIPine (NORVASC) 10 mg tablet TAKE 1 TABLET BY MOUTH ONCE DAILY *CHANGE IN STRENGTH* (Patient taking differently: Take 10 mg by mouth daily.) 90 Tab 0    Blood-Glucose Meter monitoring kit Pt checks blood sugar once daily, Dx E11.9 1 Kit 0    Lancets misc Pt checks blood sugar once daily, Dx E11.9 100 Each 3    metFORMIN ER (GLUCOPHAGE XR) 500 mg tablet Take 2 Tabs by mouth daily (with dinner). (Patient taking differently: Take 500 mg by mouth daily (with dinner). ) 180 Tab 0    atorvastatin (LIPITOR) 20 mg tablet TAKE 1 TABLET BY MOUTH ONCE DAILY (Patient taking differently: Take 20 mg by mouth daily.) 90 Tab 3    glimepiride (AMARYL) 4 mg tablet Take 1 Tab by mouth every morning. 90 Tab 3    spironolactone (ALDACTONE) 25 mg tablet Take 1 Tab by mouth daily. (Patient not taking: Reported on 2/24/2020) 30 Tab 11    b complex vitamins (B COMPLEX 1) tablet Take 1 Tab by mouth daily.       glucose blood VI test strips (BLOOD GLUCOSE TEST) strip Pt checks blood sugar once daily, Dx e11.9 100 Strip 3    aspirin 81 mg chewable tablet Take 81 mg by mouth daily. Indications: NOT TAKING          ALLERGIES  Allergies   Allergen Reactions    Penicillins Unknown (comments)     Tongue swelling; difficulty swallowing; thrush    Compazine [Prochlorperazine] Unknown (comments)     Paranoia    Invokana [Canagliflozin] Other (comments)     Yeast infections          SOCIAL HISTORY    Social History     Socioeconomic History    Marital status:      Spouse name: Not on file    Number of children: 2    Years of education: Not on file    Highest education level: Not on file   Occupational History    Occupation: works at [employer]    Smoking status: Never Smoker    Smokeless tobacco: Never Used   Substance and Sexual Activity    Alcohol use: Yes     Comment: social drinker    Drug use: No       FAMILY HISTORY  Family History   Problem Relation Age of Onset    Diabetes Mother          REVIEW OF SYSTEMS  Review of Systems   Musculoskeletal: Positive for back pain. RLE paraesthesia           PHYSICAL EXAMINATION  Visit Vitals  /73 (BP 1 Location: Right arm, BP Patient Position: Sitting)   Pulse 93   Temp 97.8 °F (36.6 °C) (Oral)   Resp 28   Ht 5' 5\" (1.651 m)   Wt 236 lb 6.4 oz (107.2 kg)   SpO2 100%   BMI 39.34 kg/m²       Pain Assessment  2/24/2020   Location of Pain Back   Location Modifiers Inferior   Severity of Pain 6   Quality of Pain Dull   Quality of Pain Comment -   Frequency of Pain Intermittent   Result of Injury No           Constitutional:  Well developed, well nourished, in no acute distress. Psychiatric: Affect and mood are appropriate. Integumentary: No rashes or abrasions noted on exposed areas. SPINE/MUSCULOSKELETAL EXAM    Cervical spine:  Neck is midline. Normal muscle tone. No focal atrophy is noted. ROM pain free. Shoulder ROM intact. No tenderness to palpation. Negative Spurling's sign. Negative Tinel's sign. Negative Sainz's sign. Sensation in the bilateral arms grossly intact to light touch.      Lumbar spine:  No rash, ecchymosis, or gross obliquity. No fasciculations. No focal atrophy is noted. No pain with hip ROM. Full range of motion. No tenderness to palpation. No tenderness to palpation at the sciatic notch. SI joints non-tender. Trochanters non tender. Sensation in the bilateral legs grossly intact to light touch.     Positive Straight Leg raise on the R.     MOTOR:      Biceps  Triceps Deltoids Wrist Ext Wrist Flex Hand Intrin   Right 5/5 5/5 5/5 5/5 5/5 5/5   Left 5/5 5/5 5/5 5/5 5/5 5/5             Hip Flex  Quads Hamstrings Ankle DF EHL Ankle PF   Right 5/5 5/5 5/5 5/5 5/5 5/5   Left 5/5 5/5 5/5 5/5 5/5 5/5     DTRs are 2+ biceps, triceps, brachioradialis, patella, and Achilles.     Squat not tested. No difficulty with tandem gait.      Ambulation without assistive device. FWB.       RADIOGRAPHS  Lumbar XR images taken on 12/26/19 personally reviewed with patient:  Five lumbar vertebra's are visualized in the alignment is normal. No  acute fracture or spondylolisthesis is seen. No vertebral body height loss or  intervertebral space narrowing is demonstrated. Visualized soft tissues are  unremarkable.     IMPRESSION  IMPRESSION:     1. No acute compression deformity or spondylolisthesis. 12 minutes of face-to-face contact were spent with the patient during today's visit extensively discussing symptoms and treatment plan. All questions were answered. More than half of this visit today was spent on counseling.      Written by Princess Calzada as dictated by Abbey Greenwood MD

## 2020-02-27 ENCOUNTER — HOSPITAL ENCOUNTER (OUTPATIENT)
Dept: PHYSICAL THERAPY | Age: 50
Discharge: HOME | End: 2020-02-27
Payer: COMMERCIAL

## 2020-02-27 PROCEDURE — 97140 MANUAL THERAPY 1/> REGIONS: CPT

## 2020-02-27 PROCEDURE — 97110 THERAPEUTIC EXERCISES: CPT

## 2020-02-27 PROCEDURE — 97112 NEUROMUSCULAR REEDUCATION: CPT

## 2020-03-11 ENCOUNTER — OFFICE VISIT (OUTPATIENT)
Dept: INTERNAL MEDICINE CLINIC | Age: 50
End: 2020-03-11

## 2020-03-11 NOTE — Clinical Note
Juanpablo Moe,  I have attached my last note for Ms. Juan Ramon Marshall. She was not checking BG at home, so I continued her current DM regimen and will reassess BG this week.   Alana Velazquez, PharmD Clinical Pharmacist Specialist

## 2020-03-11 NOTE — PROGRESS NOTES
CC:  Diabetes management    S/O: Mark Cook is a 52 y.o. female referred by Dr. Peggy Casillas for diabetes management. PMH significant for anxiety, asthma, chronic anemia, depression, diabetes mellitus, history of CVA, hyperlipidemia, hypertension, obesity, JOLIE. Current diabetes regimen consists of the following: Bydureon BCise 2 mg once weekly, glimepiride 4 mg daily, metformin ER 1,000 mg daily, pioglitazone 30 mg daily.  -Takes glimepiride and Actos in the morning  -Takes metformin with dinner  -Takes Bydureon BCise on Sundays    Patient reports the following signs/symptoms of diabetes: excessive thirst (but also on diuretic medications), polyphagia, blurry vision, tingling in fingers and toes (comes and goes). Patient reports recent hypoglycemic symptoms, with 1 hypoglycemic episodes in the last 2 months. States last month had episode where she felt weak like she was going to pass out, had slurred speech.  -States she had a cup of orange juice - then felt better    Patient states she hasn't been checking BG at home.  Most recent A1C was 8.7 % on 1/31/2020.  -States \"I've been bad\" and hasn't been checking BG - last time was 1 month ago      A typical days food intake is as follows:  Breakfast: Doesn't eat breakfast usually; chips or some juice or iced tea  Lunch: Soup or salad with Caesar dressing, sometimes cheeseburger and fries  Dinner: Sometimes orders [employer] food; bakes chicken a lot, with sliced potatoes and gravy   -Eats a lot of vegetables - kale, broccoli  Beverages: Juice, iced tea, water, Erin drinks (fruit smoothie drink with 1 gram of sugar), V8 with low sugar (1 gram of sugar)  Snacks: Potato chips or doritos (around 3-4 times per week), pickles  -States she has been \"eating bad\" and \"snacking a lot\"    Exercise: States she used to go to dance class, but stopped going since going to physical therapy and having trouble walking  -States Lyrica is helping with her leg pain    Patient reports adherence with Her medications. Patient reports adverse effects from their medications - some drowsiness from Lyrica.   -Didn't bring in medications to visit today - read off of medication list on phone, but unsure about some medications  -States she gets \"a little loopy\" or drowsy after taking Lyrica    Past Medical History:   Diagnosis Date    Acanthosis nigricans     Anxiety     Asthma     Chronic anemia     Dr Dwight Valderrama; thalassemia    Depression     [facility] [address]     Heavy menses 3/12    s/p endometrial ablation 3/16    History of CVA (cerebrovascular accident)     no deficits    History of iron deficiency anemia     from menorrhagia    Hyperlipidemia     Hypertension     Hypovitaminosis D 10/11    Morbid obesity ([facility] [address]     dec dharmesh supp, med sup wt loss, bariatrics; IF 3/18 start weight 226 lbs but not doing    JOLIE (obstructive sleep apnea)     Dr. Danielle Purcell cpap    Perennial allergic rhinitis      Past Surgical History:   Procedure Laterality Date    HX  SECTION      x2    HX COLONOSCOPY      Dr Dwight Valderrama 6/10 negative    HX GI  6/10    neg EGD Dr Reginaldo Silveira HX GYN      cyst removal    HX GYN      LEEP    HX HYSTEROSCOPY WITH ENDOMETRIAL ABLATION  3/16    Dr Cr Tavares     Family History   Problem Relation Age of Onset    Diabetes Mother      Social History     Socioeconomic History    Marital status:      Spouse name: Not on file    Number of children: 2    Years of education: Not on file    Highest education level: Not on file   Occupational History    Occupation: works at [employer] Use    Smoking status: Never Smoker    Smokeless tobacco: Never Used   Substance and Sexual Activity    Alcohol use: Yes     Comment: social drinker    Drug use: No     Allergies   Allergen Reactions    Penicillins Unknown (comments)     Tongue swelling; difficulty swallowing; thrush    Compazine [Prochlorperazine] Unknown (comments)     Paranoia    Invokana [Canagliflozin] Other (comments)     Yeast infections    Elizabeth Swelling     Swelling and itching of mouth, lips, and tongue     Current Outpatient Medications   Medication Sig    pioglitazone (ACTOS) 15 mg tablet Take 2 Tabs by mouth daily.  ergocalciferol (VITAMIN D2) 1,250 mcg (50,000 unit) capsule Take 1 Cap by mouth every seven (7) days.  pregabalin (LYRICA) 150 mg capsule Take 1 Cap by mouth two (2) times a day. Max Daily Amount: 300 mg.    atorvastatin (LIPITOR) 20 mg tablet TAKE 1 TABLET BY MOUTH ONCE DAILY    citalopram (CELEXA) 20 mg tablet TAKE 1 TABLET BY MOUTH ONCE DAILY    candesartan (ATACAND) 16 mg tablet TAKE 1 TABLET BY MOUTH ONCE DAILY    glimepiride (AMARYL) 4 mg tablet Take 1 Tab by mouth every morning.  spironolactone (ALDACTONE) 25 mg tablet Take 1 Tab by mouth daily.  chlorthalidone (HYGROTEN) 25 mg tablet TAKE 1 TABLET BY MOUTH ONCE DAILY    exenatide microspheres (BYDUREON BCISE) 2 mg/0.85 mL atIn 2 mg by SubCUTAneous route every seven (7) days.  KLOR-CON M20 20 mEq tablet TAKE 1 TABLET BY MOUTH ONCE DAILY    budesonide-formoterol (SYMBICORT) 160-4.5 mcg/actuation HFAA Take 2 Puffs by inhalation two (2) times a day.  albuterol (PROVENTIL HFA, VENTOLIN HFA, PROAIR HFA) 90 mcg/actuation inhaler Take 1-2 Puffs by inhalation every four (4) hours as needed for Wheezing.  amLODIPine (NORVASC) 10 mg tablet TAKE 1 TABLET BY MOUTH ONCE DAILY *CHANGE IN STRENGTH*    Blood-Glucose Meter monitoring kit Pt checks blood sugar once daily, Dx E11.9    Lancets misc Pt checks blood sugar once daily, Dx E11.9    glucose blood VI test strips (BLOOD GLUCOSE TEST) strip Pt checks blood sugar once daily, Dx e11.9    metFORMIN ER (GLUCOPHAGE XR) 500 mg tablet Take 2 Tabs by mouth daily (with dinner). No current facility-administered medications for this visit. There were no vitals taken for this visit.     Data reviewed:  Lab Results   Component Value Date/Time Hemoglobin A1c 8.0 (H) 09/17/2019 04:16 PM    Hemoglobin A1c (POC) 8.7 01/31/2020 03:32 PM     Lab Results   Component Value Date/Time    Cholesterol, total 153 09/17/2019 04:16 PM    HDL Cholesterol 41 09/17/2019 04:16 PM    LDL, calculated 85 09/17/2019 04:16 PM    VLDL, calculated 28 09/17/2019 04:16 PM    Triglyceride 138 09/17/2019 04:16 PM     Lab Results   Component Value Date/Time    ALT (SGPT) 21 09/17/2019 04:16 PM    AST (SGOT) 18 09/17/2019 04:16 PM    Alk. phosphatase 86 09/17/2019 04:16 PM    Bilirubin, total 0.3 09/17/2019 04:16 PM     Lab Results   Component Value Date/Time    Creatinine, POC 0.6 03/31/2017 11:08 AM    Creatinine 0.6 09/17/2019 04:16 PM     Lab Results   Component Value Date/Time    Microalb/Creat ratio (ug/mg creat.) 31.6 (H) 09/17/2019 04:16 PM       Screenings:  Last eye exam - due  Last foot exam - due    Assessment/Plan:     1. Diabetes: uncontrolled with goal A1C at least <7%. Blood glucose readings: not currently checking BG at home.  -Discussed pathophysiology of diabetes, long-term consequences of uncontrolled diabetes and blood sugars, importance of staying up to date with eye/foot/dental exams, signs/symptoms of hypo/hyperglycemia, and A1C and BG goals with patient today    A. Medications: Stressed importance of medication adherence on overall diabetes control as well as with preventing complications of diabetes. Reviewed symptoms of hypoglycemia and how to treat. Instructed patient to continue to monitor blood sugars at least once daily (fasting or bedtime) and call the office if: she has symptoms of hypoglycemia or BG <70  -Instructed patient to continue current BG regimen of Bydureon BCise 2 mg once weekly, glimepiride 4 mg daily, metformin ER 1,000 mg daily, pioglitazone 30 mg daily  -Will assess BG numbers and control at next visit and adjust BG regimen as necessary    B.  Diet/lifestyle: Reinforced importance of regular activity/exercise to help improve insulin resistance and reviewed food labels/carbohydrates/general carb guidelines for meals (45-60 g) and snacks (goal of 15g). Patient education materials were provided and reviewed with the patient. C. Screenings/Prevention:  Vaccinations: Patient is eligible for the following vaccinations: pneumococcal  Dilated eye exam (recommended at least every 2 years or annually if retinopathy present): due  Albumin-to creatinine ratio (recommended annually): next due 9/17/2020  Foot Exam (recommended annually): due    2. Medication reconciliation was completed during the visit. Medications Discontinued During This Encounter   Medication Reason    gabapentin (NEURONTIN) 300 mg capsule Discontinued by Another Clinician    aspirin 81 mg chewable tablet Discontinued by Another Clinician    b complex vitamins (B COMPLEX 1) tablet Therapy Completed     Patient verbalized understanding of the information presented and all of the patients questions were answered. AVS was handed to the patient and information reviewed. Patient advised to call the office with any additional questions or concerns. Follow-up: office visit in 3 weeks. Notification of recommendations will be sent to Dr. Denis Argueta MD for review.     Thank you for the consult,  Pat Dunn, [facility]    Time spent with the patient:  60 mins  Time spent documenting: 15 mins

## 2020-03-11 NOTE — PATIENT INSTRUCTIONS
Your Visit Summary:  
 
1.) Continue your current diabetes medications. 2.) Check your blood sugars at least once daily (fasting, sometimes at bedtime). Bring your meter/log to all future visits. 3.) Work on carb counting and the plate method. Your blood sugar goals: 
- Fasting (first thing in the morning)  blood sugar: 80 - 130  
- 1 to 2 hours after a meal: less than 180 When you experience symptoms of low blood sugar (example: less than 70): - Confirm low reading by checking your blood sugar.  
- Then treat with 15 grams of carbohydrates (one-half cup of juice or regular soda, or 4-5 glucose tablets). - Wait 15 minutes to recheck blood sugar.  
- Then eat a protein containing meal/snack to prevent another low blood sugar episode. (example: peanut butter + crackers) Other recommendations: - Schedule an annual eye exam. 
- Check your feet daily for any signs of open wounds, cuts, or sores. - Given your risk factors, the following vaccines are recommended: annual flu shot, age-based pneumococcal vaccines (Pneumovax, Prevnar 13). In addition to taking your medications as directed, improving your blood sugar involves modifying your nutrition and maximizing the amount of physical activity. Nutrition: 
- When reviewing a nutrition label, focus on the serving size, total calories, fat (and type of fats), total carbohydrates, sugar (and amount of added sugar), amount of fiber (good for your digestive), and amount of protein. Refer to your nutrition label guide for more information. 
- For a meal : max 45 - 60 grams of carbohydrates - For a snack: max 15 grams of carbohydrates - Reduce amount of saturated and trans fat. Consider more unsaturated fat options as they are better for your heart health. - Have at least 1 serving of lean fat protein with each meal.   
- Increase fiber intake slowly to prevent constipation.  
- Substitute fruit juices for the whole fruit Physical Activity: - Aim for 30 minutes of consistent, moderately intensive, physical activity a day for 5 days or an average of 150 minutes per week. - Start slow, increase as tolerated. For example: Walk every day, working up to 30 minutes of brisk walking, 5 days a weekor split the 30 minutes into two 15-minute or three 10-minute walks. - If you sit for a long time, get up and move/stretch every 90 minutes. Please call [phone] for any medication or diabetes questions.  
 
Ashwini Felix, Clinical Pharmacist

## 2020-03-18 ENCOUNTER — TELEPHONE (OUTPATIENT)
Dept: INTERNAL MEDICINE CLINIC | Age: 50
End: 2020-03-18

## 2020-03-18 ENCOUNTER — APPOINTMENT (OUTPATIENT)
Dept: PHYSICAL THERAPY | Age: 50
End: 2020-03-18

## 2020-03-18 NOTE — TELEPHONE ENCOUNTER
Patient is asking for a note for work to excuse her from work for 2 weeks. 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

## 2020-03-18 NOTE — TELEPHONE ENCOUNTER
Everyone is at high risk - being alive and around people puts you at risk  Cannot give her a work note unless she gets sick

## 2020-03-19 NOTE — TELEPHONE ENCOUNTER
[id number] entered. Nola please let triage know when approved so patient can be called and scheduled. Thank you.    Pt calling asking Laurie Worthington to call her.  Says she has a question about one of hermedications

## 2020-03-20 ENCOUNTER — HOSPITAL ENCOUNTER (OUTPATIENT)
Dept: MAMMOGRAPHY | Age: 50
Discharge: HOME | End: 2020-03-20
Attending: NURSE PRACTITIONER
Payer: COMMERCIAL

## 2020-03-20 DIAGNOSIS — Z12.31 VISIT FOR SCREENING MAMMOGRAM: ICD-10-CM

## 2020-03-20 PROCEDURE — 77063 BREAST TOMOSYNTHESIS BI: CPT

## 2020-03-24 ENCOUNTER — TELEPHONE (OUTPATIENT)
Dept: ORTHOPEDIC SURGERY | Age: 50
End: 2020-03-24

## 2020-03-24 ENCOUNTER — APPOINTMENT (OUTPATIENT)
Dept: PHYSICAL THERAPY | Age: 50
End: 2020-03-24

## 2020-03-24 NOTE — TELEPHONE ENCOUNTER
Lester from B/S scheduling needs to confirm if patients MRI can wait until end of May for scheduling. Please advise Lester at [phone].

## 2020-03-27 ENCOUNTER — APPOINTMENT (OUTPATIENT)
Dept: PHYSICAL THERAPY | Age: 50
End: 2020-03-27

## 2020-03-27 DIAGNOSIS — E87.6 HYPOKALEMIA: ICD-10-CM

## 2020-03-31 ENCOUNTER — TELEPHONE (OUTPATIENT)
Dept: INTERNAL MEDICINE CLINIC | Age: 50
End: 2020-03-31

## 2020-04-01 RX ORDER — BUDESONIDE AND FORMOTEROL FUMARATE DIHYDRATE 160; 4.5 UG/1; UG/1
2 AEROSOL RESPIRATORY (INHALATION) 2 TIMES DAILY
Qty: 1 INHALER | Refills: 11 | Status: SHIPPED | OUTPATIENT
Start: 2020-04-01 | End: 2021-04-27 | Stop reason: SDUPTHER

## 2020-04-01 RX ORDER — ALBUTEROL SULFATE 90 UG/1
1-2 AEROSOL, METERED RESPIRATORY (INHALATION)
Qty: 1 INHALER | Refills: 11 | Status: SHIPPED | OUTPATIENT
Start: 2020-04-01 | End: 2021-04-27 | Stop reason: SDUPTHER

## 2020-04-03 NOTE — PROGRESS NOTES
[facility]  [address]  [place], [address]  [phone] fax    Physical Therapy Discharge Summary  Patient name: Faby Enciso Start of Care: 20   Referral source: Milan Spencer MD : 1970   Medical/Treatment Diagnosis: Low back pain [M54.5]  Radiculopathy, lumbar region [M54.16]  Payor: Namita Graff / Plan: [address] [place] [address] [place] HMO / Product Type: HMO /  Onset Date:2019     Prior Hospitalization: see medical history Provider#: [id number]   Medications: Verified on Patient Summary List    Comorbidities: diabetic, hypertension, asthma   Prior Level of Function: independent void of pain  Visits from Start of Care: 2    Missed Visits: 1  Reporting Period : 20 to 20      Summary of Care:  Short Term Goals: To be accomplished in 2 weeks:               1. Patient will report performance of HEP at least 2 times per day to facilitate improved outcomes and improved self management. Current: not met, reports non compliance      Long Term Goals: To be accomplished in 4 weeks:               1. Patient will report FOTO score of 66 or better to indicate significant improvement in functional status. Eval: 58  Current: not assessed               2. Pt will demonstrate trunk flexion AROM fingers to floor void of pain provocation to improve ease of daily activity. Eval: Fingers to ankles  Current: not assessed               3. Pt will demonstrate (-) right unilateral SLR to indicate reduced dural irritation an improve QOL. Eval: +SLR on right  Current: not assessed              4. Pt will demonstrate ability to perform squat with 20# KB x 10 void of pain to improve ease of daily tasks.   Eval: unable to squat without pain  Current: not assessed  Patient has not returned to PT since 20; unable to reassess goals; unplanned D/C.     ASSESSMENT/RECOMMENDATIONS:  [x]Discontinue therapy: []Patient has reached or is progressing toward set goals      [x]Patient is non-compliant or has abdicated      []Due to lack of appreciable progress towards set [address], PT 4/3/2020 3:13 PM

## 2020-04-07 ENCOUNTER — TELEPHONE (OUTPATIENT)
Dept: INTERNAL MEDICINE CLINIC | Age: 50
End: 2020-04-07

## 2020-04-07 NOTE — TELEPHONE ENCOUNTER
patient callback stating she does not feel comfortable going to clinic at this time and refused appointment. Appointment was canceled.

## 2020-04-07 NOTE — TELEPHONE ENCOUNTER
How many days sick: 2 days  Who is the exposure from: She works in the [employer]  Is there fever and how high (100.4 in 64yo, 101.4 in younger) No fever  Are there upper resp (sinus/ear/throat) sx Scratchy throat  Are there lower resp (chest congestion/cough/sputum/wheezing/sob) sx- Cough/Wheezing/SOB  Any other flu like sx (aches/n/v/diarrhea)  None  What's been tried otc or inhalers- Nothing  H/o copd/asthma/lung disease/dm/renal fauilure:  Tami

## 2020-04-07 NOTE — TELEPHONE ENCOUNTER
Patient scheduled with  today at 10:40am at [address] [place] [address]. Patient made aware.

## 2020-04-07 NOTE — TELEPHONE ENCOUNTER
pls sched with [facility]  Per the algorithm, we cannot see anyone with cough in the office unfortunately

## 2020-06-09 ENCOUNTER — HOSPITAL ENCOUNTER (OUTPATIENT)
Dept: LAB | Age: 50
Discharge: HOME | End: 2020-06-09

## 2020-06-09 LAB — SENTARA SPECIMEN COL,SENBCF: NORMAL

## 2020-06-09 PROCEDURE — 99001 SPECIMEN HANDLING PT-LAB: CPT

## 2020-06-10 LAB
A-G RATIO,AGRAT: 1.6 RATIO (ref 1.1–2.6)
ALBUMIN SERPL-MCNC: 4.7 G/DL (ref 3.5–5)
ALP SERPL-CCNC: 84 U/L (ref 25–115)
ALT SERPL-CCNC: 21 U/L (ref 5–40)
ANION GAP SERPL CALC-SCNC: 17 MMOL/L
AST SERPL W P-5'-P-CCNC: 15 U/L (ref 10–37)
AVG GLU, 10930: 171 MG/DL (ref 91–123)
BILIRUB SERPL-MCNC: 0.3 MG/DL (ref 0.2–1.2)
BUN SERPL-MCNC: 12 MG/DL (ref 6–22)
CALCIUM SERPL-MCNC: 10.1 MG/DL (ref 8.4–10.5)
CHLORIDE SERPL-SCNC: 93 MMOL/L (ref 98–110)
CHOLEST SERPL-MCNC: 145 MG/DL (ref 110–200)
CO2 SERPL-SCNC: 25 MMOL/L (ref 20–32)
CREAT SERPL-MCNC: 0.6 MG/DL (ref 0.5–1.2)
CREATININE, URINE: 133 MG/DL
ERYTHROCYTE [DISTWIDTH] IN BLOOD BY AUTOMATED COUNT: 16.2 % (ref 10–15.5)
FOLATE,FOL: 14.1 NG/ML
GFRAA, 66117: >60
GFRNA, 66118: >60
GLOBULIN,GLOB: 3 G/DL (ref 2–4)
GLUCOSE SERPL-MCNC: 127 MG/DL (ref 70–99)
HBA1C MFR BLD HPLC: 7.6 % (ref 4.8–5.6)
HCT VFR BLD AUTO: 35.9 % (ref 35.1–48)
HDLC SERPL-MCNC: 3.9 MG/DL (ref 0–5)
HDLC SERPL-MCNC: 37 MG/DL
HGB BLD-MCNC: 10.4 G/DL (ref 11.7–16)
LDL/HDL RATIO,LDHD: 2.2
LDLC SERPL CALC-MCNC: 80 MG/DL (ref 50–99)
MCH RBC QN AUTO: 22 PG (ref 26–34)
MCHC RBC AUTO-ENTMCNC: 29 G/DL (ref 31–36)
MCV RBC AUTO: 74 FL (ref 81–99)
MICROALB/CREAT RATIO, 140286: 18.8 (ref 0–30)
MICROALBUMIN,URINE RANDOM 140054: 25 MG/L (ref 0.1–17)
NON-HDL CHOLESTEROL, 011976: 108 MG/DL
PLATELET # BLD AUTO: 273 K/UL (ref 140–440)
PMV BLD AUTO: 11.1 FL (ref 9–13)
POTASSIUM SERPL-SCNC: 3.6 MMOL/L (ref 3.5–5.5)
PROT SERPL-MCNC: 7.7 G/DL (ref 6.4–8.3)
RBC # BLD AUTO: 4.83 M/UL (ref 3.8–5.2)
SODIUM SERPL-SCNC: 135 MMOL/L (ref 133–145)
T4 FREE SERPL-MCNC: 1.1 NG/DL (ref 0.9–1.8)
TRIGL SERPL-MCNC: 143 MG/DL (ref 40–149)
TSH SERPL DL<=0.005 MIU/L-ACNC: 1.35 MCU/ML (ref 0.27–4.2)
VIT B12 SERPL-MCNC: 899 PG/ML (ref 211–911)
VLDLC SERPL CALC-MCNC: 29 MG/DL (ref 8–30)
WBC # BLD AUTO: 4 K/UL (ref 4–11)

## 2020-06-12 ENCOUNTER — HOSPITAL ENCOUNTER (OUTPATIENT)
Age: 50
Discharge: HOME | End: 2020-06-12
Attending: PHYSICAL MEDICINE & REHABILITATION
Payer: COMMERCIAL

## 2020-06-12 DIAGNOSIS — M54.16 LUMBAR RADICULOPATHY: ICD-10-CM

## 2020-06-12 PROCEDURE — 72148 MRI LUMBAR SPINE W/O DYE: CPT

## 2020-06-18 ENCOUNTER — OFFICE VISIT (OUTPATIENT)
Dept: ORTHOPEDIC SURGERY | Age: 50
End: 2020-06-18

## 2020-06-18 VITALS
SYSTOLIC BLOOD PRESSURE: 135 MMHG | TEMPERATURE: 98 F | DIASTOLIC BLOOD PRESSURE: 82 MMHG | RESPIRATION RATE: 20 BRPM | HEART RATE: 88 BPM | WEIGHT: 243 LBS | BODY MASS INDEX: 40.48 KG/M2 | HEIGHT: 65 IN | OXYGEN SATURATION: 99 %

## 2020-06-18 DIAGNOSIS — M48.062 SPINAL STENOSIS OF LUMBAR REGION WITH NEUROGENIC CLAUDICATION: Primary | ICD-10-CM

## 2020-06-18 NOTE — PROGRESS NOTES
Blake Ruiz presents today for   Chief Complaint   Patient presents with    Back Pain     lower right side    Leg Pain     right       Is someone accompanying this pt? no    Is the patient using any DME equipment during OV? no    Depression Screening:  3 most recent PHQ Screens 9/27/2019   Little interest or pleasure in doing things Not at all   Feeling down, depressed, irritable, or hopeless Not at all   Total Score PHQ 2 0       Learning Assessment:  Learning Assessment 9/15/2014   PRIMARY LEARNER Patient   HIGHEST LEVEL OF EDUCATION - PRIMARY LEARNER  SOME COLLEGE   BARRIERS PRIMARY LEARNER NONE   CO-LEARNER CAREGIVER No   PRIMARY LANGUAGE ENGLISH   LEARNER PREFERENCE PRIMARY LISTENING     READING   ANSWERED BY patient   RELATIONSHIP SELF       Abuse Screening:  Abuse Screening Questionnaire 9/27/2019   Do you ever feel afraid of your partner? N   Are you in a relationship with someone who physically or mentally threatens you? N   Is it safe for you to go home? Y       Coordination of Care:  1. Have you been to the ER, urgent care clinic since your last visit? no  Hospitalized since your last visit? no    2. Have you seen or consulted any other health care providers outside of the [address] [place] [address] since your last visit? no Include any pap smears or colon screening.  no

## 2020-06-18 NOTE — PATIENT INSTRUCTIONS
Learning About Lumbar Epidural Steroid Injections What is a lumbar epidural steroid injection? A doctor may give you a lumbar epidural steroid injection to try to decrease pain, tingling, or numbness in your back, buttock, or leg. These might be the result of a back or disc problem. The injection goes directly into your epidural space. This is the area in your back around your spinal cord. This injection may have both a local anesthetic and a steroid medicine. Or it may only have a steroid. Local anesthetic medicines numb your nerves right away for a short time. Steroids reduce swelling and pain. But they take a few days to start working. Some people get a series of these injections over weeks or months. How is a lumbar epidural done? The doctor may use an imaging test before or during your injection. This can be an MRI, a CT scan, or an X-ray. These tests can show where your nerve problems are. After finding the right spot, the doctor may inject a numbing medicine into the skin where you will get the steroid injection. Then he or she puts the needle for the steroid into the numbed area. You may feel some pressure. You could feel some stinging or burning during the injection. It takes about 10 to 15 minutes to get this injection. You will probably go home about 20 to 30 minutes after you get it. You will need someone to drive you home. What can you expect after a lumbar epidural? 
If your injection had local anesthetic and a steroid, your legs may feel heavy or numb right after. You will probably be able to walk. But you may need to be extra careful. Take care not to lose your balance and be sure to follow your doctor's instructions. If your injection contained local anesthetic, you may feel better right away. But this pain relief will last only a few hours. Your pain will probably return.  This is because the steroids have not started working yet. Before the steroids start to work, your back may be sore for a few days. These injections don't always work. When they do, it takes 1 to 5 days. This pain relief can last for several days to a few months or longer. You may want to do less than normal for a few days. But you may also be able to return to your daily routine. Some people are dizzy or feel sick to their stomach after getting this injection. These symptoms usually do not last very long. If your pain is better, you may be able to keep doing your normal activities or physical therapy. But try not to overdo it, even if your back pain has improved a lot. If your pain is only a little better or if it comes back, your doctor may recommend another injection in a few weeks. If your pain has not changed, talk to your doctor about other treatment choices. Side effects from an epidural steroid injection include headache, fever, or infection. Serious side effects are rare. But they can include stroke, paralysis, or loss of vision. Follow-up care is a key part of your treatment and safety. Be sure to make and go to all appointments, and call your doctor if you are having problems. It's also a good idea to know your test results and keep a list of the medicines you take. Where can you learn more? Go to http://thompson-alexandre.info/ Enter Lelon Siem[employer] in the search box to learn more about \"Learning About Lumbar Epidural Steroid Injections. \" Current as of: March 2, 2020               Content Version: 12.5 © 5903-1503 [employer]. Care instructions adapted under license by listedplaces (which disclaims liability or warranty for this information). If you have questions about a medical condition or this instruction, always ask your healthcare professional. [place] [address] any warranty or liability for your use of this information. Lumbar Spinal Stenosis: Care Instructions Your Care Instructions Stenosis in the spine is a narrowing of the canal that is around the spinal cord and nerve roots in your back. It can happen as part of aging. Sometimes bone and other tissue grow into this canal and press on the nerves that branch out from the spinal cord. This can cause pain, numbness, and weakness. When it happens in the lower part of your back, it is called lumbar spinal stenosis. It can cause problems in the legs, feet, and rear end (buttocks). You may be able to get relief from the symptoms of spinal stenosis by taking pain medicine. Your doctor may suggest physical therapy and exercises to keep your spine strong and flexible. Some people try steroid shots to reduce swelling. If pain and numbness in your legs are still so bad that you cannot do your normal activities, you may need surgery. Follow-up care is a key part of your treatment and safety. Be sure to make and go to all appointments, and call your doctor if you are having problems. It's also a good idea to know your test results and keep a list of the medicines you take. How can you care for yourself at home? · Take an over-the-counter pain medicine. Nonsteroidal anti-inflammatory drugs (NSAIDs) such as ibuprofen or naproxen seem to work best. But if you can't take NSAIDs, you can try acetaminophen. Be safe with medicines. Read and follow all instructions on the label. · Do not take two or more pain medicines at the same time unless the doctor told you to. Many pain medicines have acetaminophen, which is Tylenol. Too much acetaminophen (Tylenol) can be harmful. · Stay at a healthy weight. Being overweight puts extra strain on your spine. · Change positions often when you sit or stand. This can ease pain. It may also reduce pressure on the spinal cord and its nerves. · Avoid doing things that make your symptoms worse. Walking downhill and standing for a long time may cause pain. · Stretch and strengthen your back muscles as your doctor or physical therapist recommends. If your doctor says it is okay to do them, these exercises may help. ? Lie on your back with your knees bent. Gently pull one bent knee to your chest. Put that foot back on the floor, and then pull the other knee to your chest. 
? Do pelvic tilts. Lie on your back with your knees bent. Tighten your stomach muscles. Pull your belly button (navel) in and up toward your ribs. You should feel like your back is pressing to the floor and your hips and pelvis are slightly lifting off the floor. Hold for 6 seconds while breathing smoothly. ? Stand with your back flat against a wall. Slowly slide down until your knees are slightly bent. Hold for 10 seconds, then slide back up the wall. · Remove or change anything in your house that may cause you to fall. Keep walkways clear of clutter, electrical cords, and throw rugs. When should you call for help? [id number] anytime you think you may need emergency care. For example, call if: 
· You are unable to move a leg at all. Call your doctor now or seek immediate medical care if: 
· You have new or worse symptoms in your legs, belly, or buttocks. Symptoms may include: 
? Numbness or tingling. ? Weakness. ? Pain. · You lose bladder or bowel control. Watch closely for changes in your health, and be sure to contact your doctor if: 
· You have a fever, lose weight, or don't feel well. · You are not getting better as expected. Where can you learn more? Go to http://thompson-alexandre.info/ Edwadro Ware in the search box to learn more about \"Lumbar Spinal Stenosis: Care Instructions. \" Current as of: March 2, 2020               Content Version: 12.5 © 4938-3833 [employer]. Care instructions adapted under license by [employer] (which disclaims liability or warranty for this information).  If you have questions about a medical condition or this instruction, always ask your healthcare professional. [place] [address] any warranty or liability for your use of this information.

## 2020-06-18 NOTE — PROGRESS NOTES
[address]  [address], [address]  [facility], [address]  Phone: [phone]  Fax: [phone]        Shauna Biswass  : 1970  PCP: Frances Bob MD  2020    PROGRESS NOTE      HISTORY OF PRESENT ILLNESS  Jane Garcia is a 52 y.o. female who was seen as a new patient 2020 with c/o low back pain radiating into the posterior aspect of the RLE since 2019. Her symptoms began when she was at [facility] and they radiated all the way to her foot. Later, her pain only radiated to the foot when it was flared up. Note from Dr. Corey Amado 19 indicating patient was seen with c/o 'sciatica' on the right side. She was on a family outing to [facility] and started having pain in the right buttock radiating to the right leg. She reported the pain was intermittent and sometimes radiated into the 3rd toe. She has used Ibuprofen 800 BID with minimal relief and occasionally used Tylenol as well. She was seen by urgent care 19 where she was given the dx of sciatica. She was given PREDNISONE which 'made her crazy' with jitteriness, nervousness, insomnia, palpitations, and increase in blood sugars. She was prescribed Gabapentin 100 mg TID and it has provided some benefit. She attended PT (2020; [facility]). Pt noted that she had a new pain in her left thigh following dancing at a wedding and party, but it began to improve. Pt noted that she saw some improvement of the RLE paraesthesia with the increased dose of GABAPENTIN, but it caused somnolence so she was unable to take it during the day. She was prescribed Lyrica. Jane Garcia comes in to the office today for f/u. She continues to have low back pain radiating into the RLE. She has been taking Tylenol \"to take the edge off. \" She has not been taking Lyrica consistently because she does not like to take medications.  Lumbar spine MRI dated 2020 reviewed. Per report, Moderate spinal canal and mild foraminal stenoses at L5-S1. Mild spinal canal stenosis at L4-5. Pain Score: 3/10. PmHx: DM    ASSESSMENT  This is a 52year-old female with low back pain radiating into the RLE. Her symptoms are likely due to moderate spinal stenosis due to broad central disc protrusion and congenitally small canal.     PLAN  1. She can call if she would like to proceed with an L3-4 TANA. 2. We discussed weight loss and exercise. Pt will f/u in PRN. Diagnoses and all orders for this visit:    1. Spinal stenosis of lumbar region with neurogenic claudication         PAST MEDICAL HISTORY   Past Medical History:   Diagnosis Date    Acanthosis nigricans     Anxiety     Asthma     Chronic anemia     Dr Timothy Blank; thalassemia    Depression     Diabetes mellitus ([facility] [address]     Heavy menses 3/12    s/p endometrial ablation 3/16    History of CVA (cerebrovascular accident)     no deficits    History of iron deficiency anemia     from menorrhagia    Hyperlipidemia     Hypertension     Hypovitaminosis D 10/11    Morbid obesity ([facility] [address]     dec dharmesh supp, med sup wt loss, bariatrics; IF 3/18 start weight 226 lbs but not doing    JOLIE (obstructive sleep apnea)     Dr. Landa Promise cpap    Perennial allergic rhinitis        Past Surgical History:   Procedure Laterality Date    HX  SECTION      x2    HX COLONOSCOPY      Dr Timothy Blank 6/10 negative    HX GI  6/10    neg EGD Dr Timothy Blank    HX GYN      cyst removal    HX GYN      LEEP    HX HYSTEROSCOPY WITH ENDOMETRIAL ABLATION  3/16    Dr Eliud Terry   .       MEDICATIONS    Current Outpatient Medications   Medication Sig Dispense Refill    candesartan (ATACAND) 16 mg tablet Take 1 tablet by mouth once daily 90 Tab 3    chlorthalidone (HYGROTEN) 25 mg tablet Take 1 tablet by mouth once daily 90 Tab 3    Bydureon BCise 2 mg/0.85 mL atIn INJECT 2 MILLIGRAMS BY SUBCUTANEOUS ROUTE EVERY SEVEN (7) DAYS 4 mL 11  budesonide-formoteroL (Symbicort) 160-4.5 mcg/actuation HFAA Take 2 Puffs by inhalation two (2) times a day. 1 Inhaler 11    albuterol (PROVENTIL HFA, VENTOLIN HFA, PROAIR HFA) 90 mcg/actuation inhaler Take 1-2 Puffs by inhalation every four (4) hours as needed for Wheezing. 1 Inhaler 11    pioglitazone (ACTOS) 15 mg tablet Take 2 Tabs by mouth daily. 90 Tab 3    ergocalciferol (VITAMIN D2) 1,250 mcg (50,000 unit) capsule Take 1 Cap by mouth every seven (7) days. 13 Cap 3    pregabalin (LYRICA) 150 mg capsule Take 1 Cap by mouth two (2) times a day. Max Daily Amount: 300 mg. 60 Cap 2    atorvastatin (LIPITOR) 20 mg tablet TAKE 1 TABLET BY MOUTH ONCE DAILY 90 Tab 3    citalopram (CELEXA) 20 mg tablet TAKE 1 TABLET BY MOUTH ONCE DAILY 90 Tab 3    glimepiride (AMARYL) 4 mg tablet Take 1 Tab by mouth every morning. 90 Tab 3    spironolactone (ALDACTONE) 25 mg tablet Take 1 Tab by mouth daily. 30 Tab 11    KLOR-CON M20 20 mEq tablet TAKE 1 TABLET BY MOUTH ONCE DAILY 90 Tab 3    amLODIPine (NORVASC) 10 mg tablet TAKE 1 TABLET BY MOUTH ONCE DAILY *CHANGE IN STRENGTH* 90 Tab 0    Blood-Glucose Meter monitoring kit Pt checks blood sugar once daily, Dx E11.9 1 Kit 0    Lancets misc Pt checks blood sugar once daily, Dx E11.9 100 Each 3    glucose blood VI test strips (BLOOD GLUCOSE TEST) strip Pt checks blood sugar once daily, Dx e11.9 100 Strip 3    metFORMIN ER (GLUCOPHAGE XR) 500 mg tablet Take 2 Tabs by mouth daily (with dinner).  180 Tab 0        ALLERGIES  Allergies   Allergen Reactions    Penicillins Unknown (comments)     Tongue swelling; difficulty swallowing; thrush    Compazine [Prochlorperazine] Unknown (comments)     Paranoia    Invokana [Canagliflozin] Other (comments)     Yeast infections    [place] Swelling     Swelling and itching of mouth, lips, and tongue          SOCIAL HISTORY    Social History     Socioeconomic History    Marital status:      Spouse name: Not on file    Number of children: 2    Years of education: Not on file    Highest education level: Not on file   Occupational History    Occupation: works at [employer] Use    Smoking status: Never Smoker    Smokeless tobacco: Never Used   Substance and Sexual Activity    Alcohol use: Yes     Comment: social drinker    Drug use: No       FAMILY HISTORY  Family History   Problem Relation Age of Onset    Diabetes Mother          REVIEW OF SYSTEMS  Review of Systems   Musculoskeletal: Positive for back pain. RLE paraesthesia           PHYSICAL EXAMINATION  Visit Vitals  /82 (BP 1 Location: Right arm, BP Patient Position: Sitting)   Pulse 88   Temp 98 °F (36.7 °C) (Oral)   Resp 20   Ht 5' 5\" (1.651 m)   Wt 243 lb (110.2 kg)   SpO2 99% Comment: RA   BMI 40.44 kg/m²       Pain Assessment  2/24/2020   Location of Pain Back   Location Modifiers Inferior   Severity of Pain 6   Quality of Pain Dull   Quality of Pain Comment -   Frequency of Pain Intermittent   Result of Injury No           Constitutional:  Well developed, well nourished, in no acute distress. Psychiatric: Affect and mood are appropriate. Integumentary: No rashes or abrasions noted on exposed areas. SPINE/MUSCULOSKELETAL EXAM    Cervical spine:  Neck is midline. Normal muscle tone. No focal atrophy is noted. ROM pain free. Shoulder ROM intact.   No tenderness to palpation. Negative Spurling's sign. Negative Tinel's sign. Negative Sainz's sign.      Sensation in the bilateral arms grossly intact to light touch.      Lumbar spine:  No rash, ecchymosis, or gross obliquity. No fasciculations. No focal atrophy is noted. No pain with hip ROM. Full range of motion. No tenderness to palpation. No tenderness to palpation at the sciatic notch. SI joints non-tender.    Trochanters non tender.     Sensation in the bilateral legs grossly intact to light touch.     Positive Straight Leg raise on the R.     MOTOR:      Biceps Triceps Deltoids Wrist Ext Wrist Flex Hand Intrin   Right 5/5 5/5 5/5 5/5 5/5 5/5   Left 5/5 5/5 5/5 5/5 5/5 5/5             Hip Flex  Quads Hamstrings Ankle DF EHL Ankle PF   Right 5/5 5/5 5/5 5/5 5/5 5/5   Left 5/5 5/5 5/5 5/5 5/5 5/5     DTRs are 2+ biceps, triceps, brachioradialis, patella, and Achilles.     Squat not tested. No difficulty with tandem gait.      Ambulation without assistive device. FWB.       RADIOGRAPHS  Lumbar MRI images taken on 6/12/2020 personally reviewed with patient:  Normal alignment and vertebral body heights. Degenerative related  marrow edema along the L5-S1 endplates. Marrow signal is otherwise unremarkable. The conus medullaris looks normal ending at the L1-2 disc level. Paraspinal soft  tissues are unremarkable.     Spinal canal and neural foramen:     L1-2: Disc and facets are unremarkable. No spinal canal or foraminal stenosis.     L2-3: Disc and facets are unremarkable. No spinal canal or foraminal stenosis.     L3-4: Disc is unremarkable. Facets are mildly hypertrophic. No spinal canal or  foraminal stenosis.     L4-5: Central disc protrusion resulting in mild spinal canal stenosis. No  foraminal stenosis.     L5-S1: Broad central disc protrusion with mild facet and ligamentum flavum  hypertrophy producing moderate spinal canal and mild bilateral foraminal  stenosis.     IMPRESSION  IMPRESSION:     1. Moderate spinal canal and mild foraminal stenoses at L5-S1.  2.  Mild spinal canal stenosis at L4-5. Lumbar XR images taken on 12/26/19 personally reviewed with patient:  Five lumbar vertebra's are visualized in the alignment is normal. No  acute fracture or spondylolisthesis is seen. No vertebral body height loss or  intervertebral space narrowing is demonstrated. Visualized soft tissues are  unremarkable.      IMPRESSION  IMPRESSION:     1. No acute compression deformity or spondylolisthesis.     28 minutes of face-to-face contact were spent with the patient during today's visit extensively discussing symptoms and treatment plan. All questions were answered. More than half of this visit today was spent on counseling.      Written by Funmilayo Lunsford as dictated by Jesus Bejarano MD

## 2020-07-13 ENCOUNTER — TELEPHONE (OUTPATIENT)
Dept: INTERNAL MEDICINE CLINIC | Age: 50
End: 2020-07-13

## 2020-07-13 ENCOUNTER — OFFICE VISIT (OUTPATIENT)
Dept: FAMILY MEDICINE CLINIC | Facility: CLINIC | Age: 50
End: 2020-07-13

## 2020-07-13 VITALS
RESPIRATION RATE: 16 BRPM | HEART RATE: 90 BPM | TEMPERATURE: 98 F | OXYGEN SATURATION: 99 % | DIASTOLIC BLOOD PRESSURE: 84 MMHG | SYSTOLIC BLOOD PRESSURE: 144 MMHG

## 2020-07-13 DIAGNOSIS — R05.8 DRY COUGH: Primary | ICD-10-CM

## 2020-07-13 DIAGNOSIS — J39.2 THROAT IRRITATION: ICD-10-CM

## 2020-07-13 DIAGNOSIS — R06.2 WHEEZING: ICD-10-CM

## 2020-07-13 LAB
S PYO AG THROAT QL: NEGATIVE
VALID INTERNAL CONTROL?: YES

## 2020-07-13 NOTE — TELEPHONE ENCOUNTER
Spoke with patient she will be seen today at flu clinic at 2:40pm. No further questions or concerns.

## 2020-07-13 NOTE — TELEPHONE ENCOUNTER
JOSEF- I re-called the flu clinic at [facility] and spoke with Sergio Irwin (call center). They can see her today at 2:40 for the coughing and SOB. Need to notify patient of appointment change.

## 2020-07-13 NOTE — LETTER
NOTIFICATION RETURN TO WORK / SCHOOL 
 
7/13/2020 3:16 PM 
 
Ms. Lb Garrison [address] [place] [address],[address] [facility] [address] To Whom It May Concern: 
 
Lb Garrison is currently under the care of Nkechi Lee. Ms Marian Carranza is under quarantine until her covid results are available. She will return to work/school on: 7/17/2020. If there are questions or concerns please have the patient contact our office. Sincerely, Oliva Walton, DNP

## 2020-07-13 NOTE — TELEPHONE ENCOUNTER
Called Lisa at the flu clinic- she stated patient has to have multiple symptoms. SOB qualifies but not wheezing. They can not schedule patient she will need to be seen thru her PCP.

## 2020-07-13 NOTE — TELEPHONE ENCOUNTER
Patient was given phone number for [phone]. Patient states she spoke with another nurse at the office and they were going to call the [facility] for patient.

## 2020-07-13 NOTE — TELEPHONE ENCOUNTER
Pt c/o coughing with wheezing and SOB since Friday. Said she has had to use her inhaler more frequently but not getting better. No fever, no sore throat, no other symptoms. Please advise her at [phone].

## 2020-07-13 NOTE — PROGRESS NOTES
Chief Complaint   Patient presents with    Cough    Cold Symptoms    Wheezing    Sore Throat     not sore, itchy    Breathing Problem     hx of asthma       SUBJECTIVE:    The patient presents to our specialty flu / COVID-19 clinic with a focused complaint of the following: Cough, congestion, wheezing, itching throat, shortness of breath. Patient denies fevers, fatigue, GI/ issues. patient states she has not been taking her inhalers as directed except for recent. She is taking her albuterol as needed. Patient admits to having seasonal allergies and takes Claritin as needed. Patient denies recent travel. Pt exposed to sick contacts under investigations for possible Covid NO. Patient works at [employer] with minimal patient contact but she does have coworkers that she works close with. Pt is not a current smoker. OBJECTIVE    Visit Vitals  /84   Pulse 90   Temp 98 °F (36.7 °C)   Resp 16   SpO2 99%      General:  healthy, alert, well nourished, cooperative, pleasant and in no apparent distress. Eyes:   The lids are without swelling, lesions, or drainage. The conjunctiva is clear and noninjected. ENT:  ENT exam normal, no neck nodes or sinus tenderness. Neck: normal.  Lungs/CV: unlabored breathing. Noted mild expiratory wheezing to right upper and right lower lobe. No shortness of breath or coughing noted during visit. Heart: regular rhythm normal rate. Skin:  No rashes, no jaundice. ASSESSMENT / PLAN     ICD-10-CM ICD-9-CM    1. Dry cough  R05 786.2 NOVEL CORONAVIRUS (COVID-19)   2. Wheezing  R06.2 786.07 NOVEL CORONAVIRUS (COVID-19)   3. Throat irritation  J39.2 478.29 AMB POC RAPID STREP A       not tested for influenza. negative for strep. Based on CDC recommendations and limited testing supplies, only those patients who meet criteria will be tested for Covid.      High priority groups for testing   Symptomatic and/or Exposure /Test for Covid  Immunocompromised host (on prednisone, biological therapy, blood cancer, metastatic cancer or active chemotherapy)   [facility] worker in the home    Other high-risk group: o age >47   o Uncontrolled DM   o Uncontrolled HTN   o BMI >40, CKD/ESRD    Dialysis patients (patients going to HD units, not asymptomatic home HD/PD)    Anyone living in a congregate setting     Non High-risk patient category           Test for COVID-19   Asymptomatic, no known exposure  No    Asymptomatic, possible exposure  No    Asymptomatic, definite exposure  Provider discretion    Symptomatic, no known exposure  Yes    Symptomatic, + exposure  Yes      Patient does  meet criteria for Covid testing. COVID-19 testing was completed. Work note was given until [employer] are available. If Covid testing was completed and is negative, patient may return back to work despite quarantine originally set in place if applicable. Awaiting Covid 19 results at this time. Instructed pt on the importance of rest, fluid intake (with avoidance of red fluids), and vit C supplements. Instructed pt to check temp if possible and to take acetaminophen if fevers are noted, avoid NSAIDs. Remember to wash hands, disinfect surroundings, and avoid touching face. Instructed pt to remain home until Covid results are negative and all symptoms have improved or subsided. If they must leave home, wear a mask. Patient verbalized understanding. We have provided the patient with a detailed after visit summary which was reviewed, and red flag symptoms that would warrant an ER visit were emphasized. CC'd chart to PCP: Yes: Comment: Meredith Walters, AGNP-C, DNP      This visit was provided as a focused evaluation during the COVID -19 pandemic/national emergency. A comprehensive review of all previous patient history and testing was not conducted. Pertinent findings were elicited during the visit.

## 2020-07-14 LAB — SARS-COV-2, COV2NT: NOT DETECTED

## 2020-07-15 ENCOUNTER — TELEPHONE (OUTPATIENT)
Dept: FAMILY MEDICINE CLINIC | Facility: CLINIC | Age: 50
End: 2020-07-15

## 2020-07-15 ENCOUNTER — TELEPHONE (OUTPATIENT)
Dept: INTERNAL MEDICINE CLINIC | Age: 50
End: 2020-07-15

## 2020-07-15 DIAGNOSIS — J45.31 MILD PERSISTENT ASTHMA WITH ACUTE EXACERBATION: ICD-10-CM

## 2020-07-15 DIAGNOSIS — R05.9 COUGH: ICD-10-CM

## 2020-07-15 DIAGNOSIS — J40 BRONCHITIS: Primary | ICD-10-CM

## 2020-07-15 NOTE — LETTER
7/15/2020 1:17 PM 
 
Ms. Sergei Mosquera [address] [place] [address],[address] [facility] [id number] This letter is to inform you that your Covid-19 test done on 7/13/2020 has returned \"not detected\" of the coronavirus. You may return to work. Please wear a mask when outside of your home. If there are any questions or concerns please contact our office. Sincerely, Ruiz Billy DNP

## 2020-07-15 NOTE — PROGRESS NOTES
Vinayak Bhandari, Please notify pt their Covid test was Negative. Remind pt to stay home but if they must leave home take all precautions: wear a mask, continue social distancing, disinfect home/work areas, avoid touching the face, and sanitize hands frequently. If they need a return to work note, please provide. Thank you.

## 2020-07-15 NOTE — TELEPHONE ENCOUNTER
Pt called [facility] back, I verified identity with 2 pt identifiers. Notified of Negative covid results. Pt requested work note stating she is negative and may return to work. Ok per Dr. Murray Craven. Informed patient to call if any questions or concerns.

## 2020-07-15 NOTE — TELEPHONE ENCOUNTER
Pt tested for covid 07/13/2020 and received negative results today. She was tested at [facility]  She was told to follow up with her pcp for symptoms. She still having symptoms of cough and wheezing (stated has asthma) and some SOB.  Said symptoms are subsiding but cough is still bad and more so at night     She is requesting medication     Pharmacy is [facility]

## 2020-07-16 ENCOUNTER — TELEPHONE (OUTPATIENT)
Dept: FAMILY MEDICINE CLINIC | Facility: CLINIC | Age: 50
End: 2020-07-16

## 2020-07-16 ENCOUNTER — TELEPHONE (OUTPATIENT)
Dept: INTERNAL MEDICINE CLINIC | Age: 50
End: 2020-07-16

## 2020-07-16 RX ORDER — PREDNISONE 20 MG/1
20 TABLET ORAL 2 TIMES DAILY
Qty: 10 TAB | Refills: 0 | Status: SHIPPED | OUTPATIENT
Start: 2020-07-16 | End: 2020-09-14

## 2020-07-16 RX ORDER — DOXYCYCLINE 100 MG/1
100 TABLET ORAL 2 TIMES DAILY
Qty: 20 TAB | Refills: 0 | Status: SHIPPED | OUTPATIENT
Start: 2020-07-16 | End: 2020-07-26

## 2020-07-16 RX ORDER — HYDROCODONE POLISTIREX AND CHLORPHENIRAMINE POLISTIREX 10; 8 MG/5ML; MG/5ML
5 SUSPENSION, EXTENDED RELEASE ORAL
Qty: 100 ML | Refills: 0 | Status: SHIPPED | OUTPATIENT
Start: 2020-07-16 | End: 2020-07-26

## 2020-07-16 NOTE — TELEPHONE ENCOUNTER
[facility] is calling in regards to the Tussionex. They need to know if that is for chronic or acute condition.

## 2020-07-16 NOTE — TELEPHONE ENCOUNTER
[facility] pharmacy was advised this was for acute cough. They stated they can only fill for a 7 day supply, per Darrion Willett he stated that was okay.

## 2020-07-16 NOTE — TELEPHONE ENCOUNTER
doxycycline, prednisone and tussionex called in  pls make sure she's using the symbicort and albuterol  Watch sugar on prednisone

## 2020-07-17 ENCOUNTER — TELEPHONE (OUTPATIENT)
Dept: FAMILY MEDICINE CLINIC | Facility: CLINIC | Age: 50
End: 2020-07-17

## 2020-07-17 NOTE — TELEPHONE ENCOUNTER
Called patient and informed pt to call her PCP and get a order put in for 2nd covid test if her job requires a 2nd negative.

## 2020-07-17 NOTE — TELEPHONE ENCOUNTER
Patient's employer is requiring her to have a 2nd negative covid test prior to returning to work. Please advise.

## 2020-07-20 ENCOUNTER — TELEPHONE (OUTPATIENT)
Dept: INTERNAL MEDICINE CLINIC | Age: 50
End: 2020-07-20

## 2020-07-20 DIAGNOSIS — Z20.822 ENCOUNTER FOR LABORATORY TESTING FOR SEVERE ACUTE RESPIRATORY SYNDROME CORONAVIRUS 2 (SARS-COV-2): Primary | ICD-10-CM

## 2020-07-20 NOTE — TELEPHONE ENCOUNTER
Pt calling asking to speak [place] [facility] Says she has some questions for a nurse.

## 2020-07-20 NOTE — TELEPHONE ENCOUNTER
Pt stated she didn't take the prednisone because she had issues with medication in the past that caused some tongue swelling. Pt stated she is currently taking doxy and tussionex and now the roof her mouth is numb and swollen. Also in order for her to return to work she needs another covid test done. Please place order for nurse visit.

## 2020-07-21 ENCOUNTER — CLINICAL SUPPORT (OUTPATIENT)
Dept: FAMILY MEDICINE CLINIC | Facility: CLINIC | Age: 50
End: 2020-07-21

## 2020-07-21 DIAGNOSIS — Z20.822 ENCOUNTER FOR LABORATORY TESTING FOR SEVERE ACUTE RESPIRATORY SYNDROME CORONAVIRUS 2 (SARS-COV-2): Primary | ICD-10-CM

## 2020-07-26 LAB — SARS-COV-2, COV2: NOT DETECTED

## 2020-09-14 ENCOUNTER — HOSPITAL ENCOUNTER (EMERGENCY)
Age: 50
Discharge: HOME | End: 2020-09-14
Attending: EMERGENCY MEDICINE
Payer: COMMERCIAL

## 2020-09-14 ENCOUNTER — TELEPHONE (OUTPATIENT)
Dept: INTERNAL MEDICINE CLINIC | Age: 50
End: 2020-09-14

## 2020-09-14 VITALS
TEMPERATURE: 97.7 F | HEIGHT: 65 IN | OXYGEN SATURATION: 98 % | HEART RATE: 88 BPM | BODY MASS INDEX: 38.32 KG/M2 | WEIGHT: 230 LBS | DIASTOLIC BLOOD PRESSURE: 100 MMHG | RESPIRATION RATE: 18 BRPM | SYSTOLIC BLOOD PRESSURE: 151 MMHG

## 2020-09-14 DIAGNOSIS — M54.42 ACUTE LEFT-SIDED LOW BACK PAIN WITH LEFT-SIDED SCIATICA: Primary | ICD-10-CM

## 2020-09-14 PROCEDURE — 74011250636 HC RX REV CODE- 250/636: Performed by: EMERGENCY MEDICINE

## 2020-09-14 PROCEDURE — 96372 THER/PROPH/DIAG INJ SC/IM: CPT

## 2020-09-14 PROCEDURE — 99282 EMERGENCY DEPT VISIT SF MDM: CPT

## 2020-09-14 RX ORDER — GABAPENTIN 100 MG/1
150 CAPSULE ORAL
COMMUNITY
End: 2021-06-14

## 2020-09-14 RX ORDER — FENTANYL CITRATE 50 UG/ML
50 INJECTION, SOLUTION INTRAMUSCULAR; INTRAVENOUS
Status: COMPLETED | OUTPATIENT
Start: 2020-09-14 | End: 2020-09-14

## 2020-09-14 RX ADMIN — FENTANYL CITRATE 50 MCG: 50 INJECTION, SOLUTION INTRAMUSCULAR; INTRAVENOUS at 16:59

## 2020-09-14 NOTE — DISCHARGE INSTRUCTIONS

## 2020-09-14 NOTE — ED PROVIDER NOTES
HPI patient complains of a four-day history of left sided low back spasm and pain. She says she has a past history of similar episodes about a year ago and received physical therapy for the same. She says since finishing her physical therapy has had no further incident. She says 4 days ago she noticed some tightness and spasm in the left lower back and it is gotten progressively worse over the last several days. She denies any fall or trauma or any other eliciting event for these current symptoms. She denies any changes in bowel or bladder habits. She denies any symptoms of saddle anesthesia. No other red flags raised during the interview. She says normally she is been treating her pain with gabapentin but today when she was at work the spasms became worse and this prompted her to come the emergency room for evaluation. She describes the pain as a tight spasm pain that is in the left paralumbar muscles. Pain is positional.  She says that if she sits a certain way that does not bother her but if she bends or twists or tries to stand up quickly she gets a sharp pain in that area with an occasional radiation into the left hip. .  No other complaints given at this time.     Past Medical History:   Diagnosis Date    Acanthosis nigricans 7/13    Anxiety     Asthma     Chronic anemia     Dr Regino Carrillo; thalassemia    Depression     [facility] mellitus [facility]     Heavy menses 3/12    s/p endometrial ablation 3/16    History of CVA (cerebrovascular accident) 1996    no deficits    History of iron deficiency anemia     from menorrhagia    Hyperlipidemia     Hypertension     Hypovitaminosis D 10/11    Morbid obesity ([facility] [address]     dec dharmesh supp, med sup wt loss, bariatrics; IF 3/18 start weight 226 lbs but not doing    JOLIE (obstructive sleep apnea)     Dr. Dion Guevara cpap    Perennial allergic rhinitis     Spinal stenosis of lumbar region with neurogenic claudication        Past Surgical History:   Procedure Laterality Date    HX  SECTION      x2    HX COLONOSCOPY      Dr Remy Scott 6/10 negative    HX GI  6/10    neg EGD Dr Josue Márquez HX GYN      cyst removal    HX GYN      LEEP    HX HYSTEROSCOPY WITH ENDOMETRIAL ABLATION  3/16    Dr Chi Mclean         Family History:   Problem Relation Age of Onset    Diabetes Mother        Social History     Socioeconomic History    Marital status:      Spouse name: Not on file    Number of children: 2    Years of education: Not on file    Highest education level: Not on file   Occupational History    Occupation: works at [employer] resource strain: Not on file    Food insecurity     Worry: Not on file     Inability: Not on file   [employer] needs     Medical: Not on file     Non-medical: Not on file   Tobacco Use    Smoking status: Never Smoker    Smokeless tobacco: Never Used   Substance and Sexual Activity    Alcohol use: Yes     Comment: social drinker    Drug use: No    Sexual activity: Not on file   Lifestyle    Physical activity     Days per week: Not on file     Minutes per session: Not on file    Stress: Not on file   Relationships    Social connections     Talks on phone: Not on file     Gets together: Not on file     Attends Adventism service: Not on file     Active member of club or organization: Not on file     Attends meetings of clubs or organizations: Not on file     Relationship status: Not on file    Intimate partner violence     Fear of current or ex partner: Not on file     Emotionally abused: Not on file     Physically abused: Not on file     Forced sexual activity: Not on file   Other Topics Concern    Not on file   Social History Narrative    Not on file         ALLERGIES: Penicillins; Compazine [prochlorperazine]; Invokana [canagliflozin]; and [place]    Review of Systems   Constitutional: Negative. HENT: Negative. Respiratory: Negative. Cardiovascular: Negative. Gastrointestinal: Negative. Genitourinary: Negative. Musculoskeletal: Positive for back pain. Neurological: Negative. Psychiatric/Behavioral: Negative. Vitals:    09/14/20 1619   BP: (!) 151/100   Pulse: 88   Resp: 18   Temp: 97.7 °F (36.5 °C)   SpO2: 99%   Weight: 104.3 kg (230 lb)   Height: 5' 5\" (1.651 m)            Physical Exam  Vitals signs and nursing note reviewed. Constitutional:       Appearance: She is well-developed. Comments: Patient is alert and oriented appears a moderate degree of pain. She sits and moves stiffly with a straight back   HENT:      Head: Normocephalic and atraumatic. Eyes:      Conjunctiva/sclera: Conjunctivae normal.      Pupils: Pupils are equal, round, and reactive to light. Neck:      Musculoskeletal: Normal range of motion and neck supple. Cardiovascular:      Rate and Rhythm: Normal rate and regular rhythm. Pulmonary:      Effort: Pulmonary effort is normal.      Breath sounds: Normal breath sounds. Abdominal:      Palpations: Abdomen is soft. Musculoskeletal: Normal range of motion. General: Tenderness present. Comments: Tenderness and palpable spasm in the left lateral paralumbar muscles. No swelling induration or skin changes noted. Skin:     General: Skin is warm and dry. Neurological:      Mental Status: She is alert and oriented to person, place, and time. MDM         Procedures        Patient is feeling much better after pain meds and states she is ready to go home. She understands and agrees with the disposition and follow-up plan.   Sarah East MD 6:30 PM

## 2020-09-14 NOTE — ED TRIAGE NOTES
Patient c/o left lower back pain and spasms that began on Friday. Patient states hx of spinal stenosis.

## 2020-09-14 NOTE — LETTER
[address] [place] [address] Dr TELLES EMERGENCY DEPT 
[address] [facility] [id number] [phone] Work/School Note Date: 9/14/2020 To Whom It May concern: 
 
Josh Rodriguez was seen and treated today in the emergency room by the following provider(s): 
Attending Provider: Jacquie Shea MD. Josh Rodriguez may return to work on September 16, 2020.  
 
Sincerely, 
 
 
 
 
Maira Horton MD

## 2020-09-14 NOTE — TELEPHONE ENCOUNTER
Please call her on her cell [phone]. Stated in extreme pain with spasms.  Asking to speak with [facility]

## 2020-09-14 NOTE — TELEPHONE ENCOUNTER
Patient is asking to speak to a nurse. Stating she thinks she's having back spasms. Just want to speak to someone. Refused to schedule appt.

## 2020-09-15 NOTE — TELEPHONE ENCOUNTER
Pt calling back, ended up at [facility] ER due to the pain, no one called her back yesterday. She said the med they gave her is starting to wear off and she wants to be seen today or needs to know what she should do. Said the pain is extreme and doesn't want to wait until tomorrow for an appt.   Please advise her at [phone]

## 2020-09-15 NOTE — PROGRESS NOTES
Richi Logan presents today for   Chief Complaint   Patient presents with    Back Pain     Er f/u Seen at [facility] ER on 9-14-20 for left sided low back pain with sciatica                Coordination of Care:  1. Have you been to the ER, urgent care clinic since your last visit? Hospitalized since your last visit? Yes see note    2. Have you seen or consulted any other health care providers outside of the [address] [place] [address] since your last visit? Include any pap smears or colon screening. no      Advance Directive:  1. Do you have an advance directive in place? Patient Reply:no    2. If not, would you like material regarding how to put one in place?  Patient Reply: no

## 2020-09-16 ENCOUNTER — OFFICE VISIT (OUTPATIENT)
Dept: INTERNAL MEDICINE CLINIC | Age: 50
End: 2020-09-16

## 2020-09-16 VITALS
DIASTOLIC BLOOD PRESSURE: 78 MMHG | SYSTOLIC BLOOD PRESSURE: 130 MMHG | BODY MASS INDEX: 39.32 KG/M2 | TEMPERATURE: 98.1 F | WEIGHT: 236 LBS | HEART RATE: 94 BPM | HEIGHT: 65 IN | OXYGEN SATURATION: 98 % | RESPIRATION RATE: 20 BRPM

## 2020-09-16 DIAGNOSIS — M48.00 SPINAL STENOSIS, UNSPECIFIED SPINAL REGION: ICD-10-CM

## 2020-09-16 DIAGNOSIS — M54.50 ACUTE RIGHT-SIDED LOW BACK PAIN WITHOUT SCIATICA: Primary | ICD-10-CM

## 2020-09-16 DIAGNOSIS — B37.31 YEAST VAGINITIS: ICD-10-CM

## 2020-09-16 DIAGNOSIS — E78.5 DYSLIPIDEMIA: ICD-10-CM

## 2020-09-16 RX ORDER — FLUCONAZOLE 150 MG/1
150 TABLET ORAL DAILY
Qty: 1 TAB | Refills: 1 | Status: SHIPPED | OUTPATIENT
Start: 2020-09-16 | End: 2020-09-17

## 2020-09-16 RX ORDER — METHYLPREDNISOLONE 4 MG/1
TABLET ORAL
Qty: 1 DOSE PACK | Refills: 0 | Status: SHIPPED | OUTPATIENT
Start: 2020-09-16 | End: 2021-04-27

## 2020-09-16 RX ORDER — HYDROCODONE BITARTRATE AND ACETAMINOPHEN 5; 325 MG/1; MG/1
1 TABLET ORAL
Qty: 21 TAB | Refills: 0 | Status: SHIPPED | OUTPATIENT
Start: 2020-09-16 | End: 2020-09-23

## 2020-09-16 RX ORDER — CYCLOBENZAPRINE HCL 10 MG
10 TABLET ORAL
Qty: 30 TAB | Refills: 1 | Status: SHIPPED | OUTPATIENT
Start: 2020-09-16 | End: 2021-06-14

## 2020-09-16 NOTE — PROGRESS NOTES
52 y.o. BLACK OR  female who presents for evaluation. She was dx'ed with spinal disease and was supposed to get lumbar epidural by Dr Behzad Schmidt. Unfortunately, covid put the kibbosh on those plans.  ;ast week, she did something to her back again, this time the pain is in the left lower back although no radiation into the leg unlike the right side previously. She feels waves of spasm which make her freeze up. She went to the ER 20 and they gave her a hot of fentanyl and told her to f/u with us. Been using motrin, heat. No known injury or precipitating event. No associated gi, gu, gyn complaints    No cardiovascular complaints. Denies polyuria, polydipsia, nocturia, vision change. Not checking sugars at this time. She spoke with the educator fter she turned down recs for prandial insulin.   She had labs done in  as below    Past Medical History:   Diagnosis Date    Acanthosis nigricans     Allergic rhinitis     Anemia, iron deficiency     from menorrhagia    Anxiety     Asthma     Chronic anemia     Dr Lea Parsons; thalassemia    Depression     Diabetes mellitus ([address] [id number]    History of CVA (cerebrovascular accident)     no deficits    Hyperlipidemia     Hypertension     Hypovitaminosis D 10/11    Menorrhagia 2012    s/p endometrial ablation 3/16    Morbid obesity ([address]     dec dharmesh supp, med sup wt loss, bariatrics; IF 3/18 start weight 226 lbs but not doing    JOLIE (obstructive sleep apnea)     Dr. Liberty Velarde cpap    Spinal stenosis of lumbar region with neurogenic claudication      Past Surgical History:   Procedure Laterality Date    HX  SECTION      x2    HX COLONOSCOPY      Dr Lea Parsons 6/10 negative    HX GI  6/10    neg EGD Dr Lea Parsons    HX GYN      cyst removal    HX GYN      LEEP    HX HYSTEROSCOPY WITH ENDOMETRIAL ABLATION  3/16    Dr Mya Peguero     Social History     Socioeconomic History    Marital status:      Spouse name: Not on file    Number of children: 2    Years of education: Not on file    Highest education level: Not on file   Occupational History    Occupation: works at [employer] resource strain: Not on file    Food insecurity     Worry: Not on file     Inability: Not on file   [employer] needs     Medical: Not on file     Non-medical: Not on file   Tobacco Use    Smoking status: Never Smoker    Smokeless tobacco: Never Used   Substance and Sexual Activity    Alcohol use: Yes     Comment: social drinker    Drug use: No    Sexual activity: Not on file   Lifestyle    Physical activity     Days per week: Not on file     Minutes per session: Not on file    Stress: Not on file   Relationships    Social connections     Talks on phone: Not on file     Gets together: Not on file     Attends Bahai service: Not on file     Active member of club or organization: Not on file     Attends meetings of clubs or organizations: Not on file     Relationship status: Not on file    Intimate partner violence     Fear of current or ex partner: Not on file     Emotionally abused: Not on file     Physically abused: Not on file     Forced sexual activity: Not on file   Other Topics Concern    Not on file   Social History Narrative    Not on file     Current Outpatient Medications   Medication Sig    HYDROcodone-acetaminophen (NORCO) 5-325 mg per tablet Take 1 Tab by mouth every eight (8) hours as needed for Pain for up to 7 days. Max Daily Amount: 3 Tabs.  cyclobenzaprine (FLEXERIL) 10 mg tablet Take 1 Tab by mouth three (3) times daily as needed for Muscle Spasm(s).  methylPREDNISolone (MEDROL DOSEPACK) 4 mg tablet Per dose pack instructions    fluconazole (DIFLUCAN) 150 mg tablet Take 1 Tab by mouth daily for 1 day. FDA advises cautious prescribing of oral fluconazole in pregnancy.  gabapentin (NEURONTIN) 100 mg capsule Take 150 mg by mouth two (2) times daily as needed for Pain.     candesartan (ATACAND) 16 mg tablet Take 1 tablet by mouth once daily    chlorthalidone (HYGROTEN) 25 mg tablet Take 1 tablet by mouth once daily    Bydureon BCise 2 mg/0.85 mL atIn INJECT 2 MILLIGRAMS BY SUBCUTANEOUS ROUTE EVERY SEVEN (7) DAYS    budesonide-formoteroL (Symbicort) 160-4.5 mcg/actuation HFAA Take 2 Puffs by inhalation two (2) times a day.  albuterol (PROVENTIL HFA, VENTOLIN HFA, PROAIR HFA) 90 mcg/actuation inhaler Take 1-2 Puffs by inhalation every four (4) hours as needed for Wheezing.  pioglitazone (ACTOS) 15 mg tablet Take 2 Tabs by mouth daily.  ergocalciferol (VITAMIN D2) 1,250 mcg (50,000 unit) capsule Take 1 Cap by mouth every seven (7) days.  pregabalin (LYRICA) 150 mg capsule Take 1 Cap by mouth two (2) times a day. Max Daily Amount: 300 mg.    atorvastatin (LIPITOR) 20 mg tablet TAKE 1 TABLET BY MOUTH ONCE DAILY    citalopram (CELEXA) 20 mg tablet TAKE 1 TABLET BY MOUTH ONCE DAILY    glimepiride (AMARYL) 4 mg tablet Take 1 Tab by mouth every morning.  spironolactone (ALDACTONE) 25 mg tablet Take 1 Tab by mouth daily.  KLOR-CON M20 20 mEq tablet TAKE 1 TABLET BY MOUTH ONCE DAILY    amLODIPine (NORVASC) 10 mg tablet TAKE 1 TABLET BY MOUTH ONCE DAILY *CHANGE IN STRENGTH*    Blood-Glucose Meter monitoring kit Pt checks blood sugar once daily, Dx E11.9    Lancets misc Pt checks blood sugar once daily, Dx E11.9    glucose blood VI test strips (BLOOD GLUCOSE TEST) strip Pt checks blood sugar once daily, Dx e11.9    metFORMIN ER (GLUCOPHAGE XR) 500 mg tablet Take 2 Tabs by mouth daily (with dinner). No current facility-administered medications for this visit.       Allergies   Allergen Reactions    Penicillins Unknown (comments)     Tongue swelling; difficulty swallowing; thrush    Compazine [Prochlorperazine] Unknown (comments)     Paranoia    Invokana [Canagliflozin] Other (comments)     Yeast infections    [place] Swelling     Swelling and itching of mouth, lips, and tongue     REVIEW OF SYSTEMS: Dr Daina Robert 2017, mammo 2/13, sees [facility], colo 2010  Ophtho  no vision change or eye pain  Oral  no mouth pain, tongue or tooth problems  Ears  no hearing loss, ear pain, fullness, no swallowing problems  Cardiac  no CP, PND, orthopnea, edema, palpitations or syncope  Chest  no breast masses  Resp  no wheezing, chronic coughing, dyspnea  GI  no heartburn, nausea, vomiting, change in bowel habits, bleeding, hemorrhoids  Urinary  no dysuria, hematuria, flank pain, urgency, frequency    Visit Vitals  /78   Pulse 94   Temp 98.1 °F (36.7 °C) (Temporal)   Resp 20   Ht 5' 5\" (1.651 m)   Wt 236 lb (107 kg)   SpO2 98%   BMI 39.27 kg/m²     A&O x3  Affect is appropriate. Mood stable  No apparent distress  Anicteric, no JVD, adenopathy or thyromegaly. No carotid bruits or radiated murmur  Lungs clear to auscultation, no wheezes or rales  Heart showed regular rate and rhythm. No murmur, rubs, gallops  Abdomen soft nontender, no hepatosplenomegaly or masses. Extremities without edema. Pulses 2+  Back showed no cvat; marked soft tissue tenderness and spasm in the left lower back musculature, mild SI join tenderness on the right; she deferred on doing straight leg test due to pain.   No trochanteric tenderness    LABS  From 10/11 showed                   hba1c 11.1, ldl-p 2182, chol 166, tg 139, hdl 39, ldl-c 99,                  vit d 16.5  From 12/11 showed gluc 111, cr 0.68,              alt10,                     ldl-p 1980, chol 178, tg 104, hdl 40, ldl-c 117  From 3/12 showed                   hba1c 6.4,                   wbc 4.4, hb 11.0, plt 306,             vit d 41.6, %sat 10, ferritin 20, spep neg, fol 12.5, b12 665  From 11/12 showed gluc 196, cr 0.50,              alt 27, hba1c 9.1,  ldl-p 1745, chol 154, tg 146, hdl 41, ldl-c 84  From 7/13 showed   gluc 157, cr 0.50, gfr>60, alt 18, hba1c 8.8,   ldl-p 1017, chol 119, tg 102, hdl 39, ldl-c 60  From 8/14 showed   gluc 137, cr 0.50, gfr>60, alt 15, hba1c 7.3,       chol 118, tg 92,   hdl 34, ldl-c 66,   wbc 3.1, hb 8.9,   plt 236, umar 17.6  From 12/15 showed gluc 124, cr 0.60, gfr>60, alt 23, hba1c 6.7,       chol 120, tg 161, hdl 34, ldl-c 54,   wbc 4.5, hb 10.2, plt 274,             vit d 13.9, tsh 2.12  From 5/16 showed   gluc 121, cr 0.70, gfr>60, alt 27  From 7/16 showed        hba1c 7.4,       chol 134, tg 127, hdl 40, ldl-c 69  From 11/17 showed gluc 260, cr 1.06, gfr>60, alt 32, hba1c 9.1,       chol 143, tg 180, hdl 33, ldl-c 74,   wbc 3.5, hb 10.4, plt 245, umar 72.5, vit d 12.3  From 3/18 showed   gluc 182, cr 0.60, gfr>60,    hba1c 8.9,       chol 163, tg 105, hdl 41, ldl-c 101, wbc 3.6, hb 10.5, plt 276, camacho  70.2,  vit d 19.2  From 10/18 showed gluc 206, cr 1.25, gfr 56,                          ck/trop neg, ua neg  From 3/19 showed   gluc 85,   cr 0.60, gfr>60,    hba1c 6.8,       chol 149, tg 100, hdl 45, ldl-c 84,             umar 60.8, vit d 37.0  From 9/19 showed   gluc 85,   cr 0.60, gfr>60,    hba1c 8.0,      chol 153, tg 198, hdl 41, ldl-c 85,   wbc 3.2, hb 10.6, plt 277, umar 31.6, tsh 1.28, ft4 1.10, b12>2k, fol>20  From 1/30 showed        hba1c 8.7  From 6/20 showed   gluc 127, cr 0.60, gfr>60, alt 21, hba1c 7.6,      chol 145, tg 143, hdl 37, ldl-c 80,   wbc 4.0, hb 10.4, plt 223, umar 18.8    Patient Active Problem List   Diagnosis Code    Asthma J45.909    JOLIE and PLMD Dr. Clara Rodriguez 2005 G47.33    Hypovitaminosis D E55.9    Dyslipidemia E78.5    Primary hypertension I10    Uncontrolled type 2 diabetes mellitus with microalbuminuria E11.29, E11.65, R80.9    Severe obesity (BMI 35.0-39. 9) with comorbidity (HCC) E66.01    Mild episode of recurrent major depressive disorder ([facility] F33.0    Anxiety F41.9    Chronic anemia D64.9     Assessment and plan:  1. Diabetes. She asked to table any discussion about changing her regimen until next visit as she wants to deal with the back first  2. Hyperlipidemia. Continue current regimen. 3. Hypovitaminosis D. Supplementation  4. Sleep apnea. F/u Dr. German Zambrano  5. Morbid obesity. Lifestyle and dietary measures. Portion control reiterated. Previously declined meds, she was supposed to look into coverage for bariatrics  6. Hypertension. Continue current regimen. 7. Asthma. Continue current regimen. 8. Anemia. F/U Dr Estrella Figures prn  9. Spine. Will refer back to Dr Sebastian Scruggs  10. Acute back pain and spasm. We gave her medrol dose pack, she is aware of sfx and will watch closely,  Gave her diflucan as requested. norco and flexeril also, she will be sent to PT and back to Dr Sebastian Scruggs as above        RTC 1/21    Above conditions discussed at length and patient vocalized understanding.   All questions answered to patient satisfaction

## 2020-09-16 NOTE — LETTER
NOTIFICATION RETURN TO WORK / SCHOOL 
 
9/16/2020 11:25 AM 
 
Ms. Trini Casillas [address] [place] [address],[address] [facility] [address] To Whom It May Concern: 
 
Trini Casillas is currently under the care of Kelley Denis starting 9-16-20 She will return to work/school on: 9-23-20 If there are questions or concerns please have the patient contact our office. Sincerely, Linn Cheung MD

## 2020-10-08 ENCOUNTER — HOSPITAL ENCOUNTER (OUTPATIENT)
Dept: PHYSICAL THERAPY | Age: 50
Discharge: HOME | End: 2020-10-08
Payer: COMMERCIAL

## 2020-10-08 PROCEDURE — 97110 THERAPEUTIC EXERCISES: CPT

## 2020-10-08 PROCEDURE — 97161 PT EVAL LOW COMPLEX 20 MIN: CPT

## 2020-10-08 PROCEDURE — 97140 MANUAL THERAPY 1/> REGIONS: CPT

## 2020-10-08 PROCEDURE — 97535 SELF CARE MNGMENT TRAINING: CPT

## 2020-10-08 NOTE — PROGRESS NOTES
[facility]  [address]  [place], [address]  [phone] fax    Plan of Care/ Statement of Necessity for Physical Therapy Services  Patient name: Roberto Beckford Start of Care: 10/8/2020   Referral source: Yady Resendiz MD : 1970    Medical Diagnosis: Low back pain [M54.5]  Payor: [employer] / Plan: Jennifer Prost / Product Type: HMO /  Onset Date: 1 month ago    Treatment Diagnosis: left LBP   Prior Hospitalization: see medical history Provider#: [id number]   Medications: Verified on Patient summary List    Comorbidities: depression, DM, HTN, asthma, Hx CVA, hx right LBP    Prior Level of Function: Independent with ADLs, functional, and work tasks with no left LBP. The Plan of Care and following information is based on the information from the initial evaluation. Assessment/ key information:   Pt is a 52year old female who presents to therapy today with left LBP. Pt states that her left low back symptoms began last month with insidious onset. Pt reports having hx of right LBP earlier this year which helped with therapy per pt report but had to stop therapy secondary to Covid-19. Pt reports having increased pain with sitting, standing, and bending. Pt demonstrated decreased AROM, decreased strength, impaired pelvic alignment, and muscle tightness. Cannot rule out left SIJ involvement secondary to impaired pelvic alignment. Pt would benefit from physical therapy to improve the above impairments to help the pt return to performing ADLs, functional and work activities.      Evaluation Complexity History HIGH Complexity :3+ comorbidities / personal factors will impact the outcome/ POC ; Examination MEDIUM Complexity : 3 Standardized tests and measures addressing body structure, function, activity limitation and / or participation in recreation  ;Presentation LOW Complexity : Stable, uncomplicated  ;Clinical Decision Making MEDIUM Complexity : FOTO score of 26-74  Overall Complexity Rating: LOW   Problem List: pain affecting function, decrease ROM, decrease strength, impaired gait/ balance, decrease ADL/ functional abilitiies, decrease activity tolerance, decrease flexibility/ joint mobility and decrease transfer abilities   Treatment Plan may include any combination of the following: Therapeutic exercise, Therapeutic activities, Neuromuscular re-education, Physical agent/modality, Gait/balance training, Manual therapy, Patient education, Self Care training, Functional mobility training, Home safety training and Stair training  Patient / Family readiness to learn indicated by: asking questions, trying to perform skills and interest  Persons(s) to be included in education: patient (P)  Barriers to Learning/Limitations: None  Patient Goal (s): pain relief and more mobility  Patient Self Reported Health Status: good  Rehabilitation Potential: good    Short Term Goals: To be accomplished in 2 treatments:  1. Pt will report compliance and independence to HEP to help the pt manage their pain and symptoms. Eval: established   Long Term Goals: To be accomplished in 10 treatments:  1. Pt will increase FOTO score to 64 points to improve ability to perform ADLs. Eval: 57 points  2. Pt will increase MMT B hip flex/ER/IR to 4+/5 to improve ability to tolerate prolonged standing. Eval: 4/5 for all  3. Pt will increase AROM l/s EXT to % of WNL, right SB to 50-75% of WNL with minimal to no pain in the low back to improve ability to perform work tasks with more ease. Eval: EXT 50-75% of WNL, right SB 25-50% of WNL with pain in the low back  4. Pt will report having no limitations with walking several blocks to improve ability to return to recreational activities. Eval: limited a little per FOTO    Frequency / Duration: Patient to be seen 1-2 times per week for 10 treatments.     Patient/ Caregiver education and instruction: Diagnosis, prognosis, self care, activity modification and exercises   [x]  Plan of care has been reviewed with MADAI Chew, PT 10/8/2020 6:03 PM  _____________________________________________________________________  I certify that the above Therapy Services are being furnished while the patient is under my care. I agree with the treatment plan and certify that this therapy is necessary.     Physician's Signature:____________________  Date:__________Time:______    Please sign and return to [facility] [address]  [address] [place] [address], [facility] [address]  [phone] fax

## 2020-10-08 NOTE — PROGRESS NOTES
PT DAILY TREATMENT NOTE  10-18    Patient Name: Roberto Beckford  Date:10/8/2020  : 1970  [x]  Patient  Verified  Payor: [employer] / Plan: [employer] / Product Type: HMO /    In time: 5:18  Out time:6:00  Total Treatment Time (min): 42  Visit #: 1 of 10    Treatment Area: Low back pain [M54.5]    SUBJECTIVE  Pain Level (0-10 scale): 1  Any medication changes, allergies to medications, adverse drug reactions, diagnosis change, or new procedure performed?: [x] No    [] Yes (see summary sheet for update)  Subjective functional status/changes:   [] No changes reported  See POC    OBJECTIVE    10 min [x]Eval                  []Re-Eval     12 min Therapeutic Exercise:  [] See flow sheet : HEP instruction and demonstration   Rationale: increase ROM and increase strength to improve the patients ability to tolerate ADLs    10 min Self Care/Home Management: []  See flow sheet : pt education regarding anatomy and physiology of the spine/SIJ and how it relates to the pt's condition, pt education on using heat for 15-20 mins as needed to decrease pain/symptoms   Rationale: increase ROM, increase strength and decrease pain/symptoms  to improve the patients ability to tolerate functional tasks. 10 min Manual Therapy: In supine: pelvic alignment and leg length assessments, MET to correct left anterior innominate; in left s/l: MET to correct right sacral torsion. Rationale: decrease pain, increase ROM, increase tissue extensibility and increase postural awareness to improve walking tolerance. With   [x] TE   [] TA   [] neuro   [x] Other: Self Care/Home management/  manual therapy Patient Education: [x] Review HEP    [] Progressed/Changed HEP based on:   [] positioning   [] body mechanics   [] transfers   [] heat/ice application    [] other:      Other Objective/Functional Measures: See evaluation.      Pain Level (0-10 scale) post treatment: 0    ASSESSMENT/Changes in Function: Pt given HEP handout to perform. Pt understood exercises in HEP handout. Reported no pain post manual interventions and stated her back felt looser. Pt demonstrated decreased AROM, decreased strength, impaired pelvic alignment, and muscle tightness. Cannot rule out left SIJ involvement secondary to impaired pelvic alignment. Pt would benefit from physical therapy to improve the above impairments to help the pt return to performing ADLs, functional and work activities. Patient will continue to benefit from skilled PT services to modify and progress therapeutic interventions, address functional mobility deficits, address ROM deficits, address strength deficits, analyze and address soft tissue restrictions, analyze and cue movement patterns, analyze and modify body mechanics/ergonomics, assess and modify postural abnormalities, address imbalance/dizziness and instruct in home and community integration to attain remaining goals. [x]  See Plan of Care  []  See progress note/recertification  []  See Discharge Summary         Progress towards goals / Updated goals:  Short Term Goals: To be accomplished in 2 treatments:  1. Pt will report compliance and independence to [facility] to help the pt manage their pain and symptoms. Eval: established   Long Term Goals: To be accomplished in 10 treatments:  1. Pt will increase FOTO score to 64 points to improve ability to perform ADLs. Eval: 57 points  2. Pt will increase MMT B hip flex/ER/IR to 4+/5 to improve ability to tolerate prolonged standing. Eval: 4/5 for all  3. Pt will increase AROM l/s EXT to % of WNL, right SB to 50-75% of WNL with minimal to no pain in the low back to improve ability to perform work tasks with more ease. Eval: EXT 50-75% of WNL, right SB 25-50% of WNL with pain in the low back  4. Pt will report having no limitations with walking several blocks to improve ability to return to recreational activities.    Eval: limited a little per FOTO    PLAN  [x]  Upgrade activities as tolerated     [x]  Continue plan of care  [x]  Update interventions per flow sheet       []  Discharge due to:_  []  Other:_      Marie Talavera PT 10/8/2020  6:03 PM    Future Appointments   Date Time Provider Aureliano Shoemaker   1/21/2021  2:30 PM Dario Fabian MD [facility] BS AMB

## 2020-10-13 ENCOUNTER — TELEPHONE (OUTPATIENT)
Dept: INTERNAL MEDICINE CLINIC | Age: 50
End: 2020-10-13

## 2020-10-13 NOTE — TELEPHONE ENCOUNTER
Pt stated her job is requiring a letter in regards to her reasonings for not getting a flu shot and this must come from PCP office.  Her reasoning is because the flu shot infringes upon her religous belief

## 2020-10-20 ENCOUNTER — HOSPITAL ENCOUNTER (OUTPATIENT)
Dept: PHYSICAL THERAPY | Age: 50
Discharge: HOME | End: 2020-10-20
Payer: COMMERCIAL

## 2020-10-20 PROCEDURE — 97112 NEUROMUSCULAR REEDUCATION: CPT

## 2020-10-20 PROCEDURE — 97110 THERAPEUTIC EXERCISES: CPT

## 2020-10-20 NOTE — PROGRESS NOTES
PT DAILY TREATMENT NOTE 10-18    Patient Name: Petey Beltran  Date:10/20/2020  : 1970  [x]  Patient  Verified  Payor: Derrick Hui / Plan: [employer] / Product Type: HMO /    In time:6:00  Out time:6:40  Total Treatment Time (min): 40  Visit #: 2 of 10      Treatment Area: Low back pain [M54.5]    SUBJECTIVE  Pain Level (0-10 scale): 0  Any medication changes, allergies to medications, adverse drug reactions, diagnosis change, or new procedure performed?: [x] No    [] Yes (see summary sheet for update)  Subjective functional status/changes:   [] No changes reported  The pt reports she has been doing well with her HEP, reports no pain at this time    OBJECTIVE    25 min Therapeutic Exercise:  [] See flow sheet :   Rationale: increase ROM and increase strength to improve the patients ability to perform daily tasks with less pain     15 min Neuromuscular Re-education:  []  See flow sheet :   Rationale: increase strength, improve coordination and increase proprioception  to improve the patients ability to recruit core stabilizers for reduced back pain with ADLs            With   [] TE   [] TA   [] neuro   [] other: Patient Education: [x] Review HEP    [] Progressed/Changed HEP based on:   [] positioning   [] body mechanics   [] transfers   [] heat/ice application    [] other:      Other Objective/Functional Measures:      Pain Level (0-10 scale) post treatment: 0    ASSESSMENT/Changes in Function: The pt responded well to first f/u session today. No adverse response noted, she does report fatigue through hip musculature post exercise.  Assess response at next visit, continue with core stability     Patient will continue to benefit from skilled PT services to modify and progress therapeutic interventions, address functional mobility deficits, address ROM deficits, address strength deficits, analyze and address soft tissue restrictions, analyze and cue movement patterns and analyze and modify body mechanics/ergonomics to attain remaining goals. []  See Plan of Care  []  See progress note/recertification  []  See Discharge Summary         Progress towards goals / Updated goals:  Short Term Goals: To be accomplished in 2 treatments:  1. Pt will report compliance and independence to HEP to help the pt manage their pain and symptoms.             Eval: established   Current: Met HEP compliance 10/20/2020  Long Term Goals: To be accomplished in 10 treatments:  1. Pt will increase FOTO score to 64 points to improve ability to perform ADLs. Eval: 57 points  2. Pt will increase MMT B hip flex/ER/IR to 4+/5 to improve ability to tolerate prolonged standing. Eval: 4/5 for all  3. Pt will increase AROM l/s EXT to % of WNL, right SB to 50-75% of WNL with minimal to no pain in the low back to improve ability to perform work tasks with more ease. Eval: EXT 50-75% of WNL, right SB 25-50% of WNL with pain in the low back  4. Pt will report having no limitations with walking several blocks to improve ability to return to recreational activities.    Eval: limited a little per FOTO    PLAN  []  Upgrade activities as tolerated     []  Continue plan of care  []  Update interventions per flow sheet       []  Discharge due to:_  []  Other:_      Oleg Gamboa DPT CMTPT 10/20/2020  6:05 PM    Future Appointments   Date Time Provider Aureliano Shoemaker   11/3/2020  5:15 PM [facility]   11/17/2020  5:15 PM [facility]   1/21/2021  2:30 PM Boyd Mmebreno MD [facility] BS AMB

## 2020-11-03 ENCOUNTER — APPOINTMENT (OUTPATIENT)
Dept: PHYSICAL THERAPY | Age: 50
End: 2020-11-03

## 2020-12-07 RX ORDER — ATORVASTATIN CALCIUM 20 MG/1
TABLET, FILM COATED ORAL
Qty: 90 TAB | Refills: 0 | Status: SHIPPED | OUTPATIENT
Start: 2020-12-07 | End: 2021-06-14 | Stop reason: DRUGHIGH

## 2020-12-07 RX ORDER — PIOGLITAZONEHYDROCHLORIDE 15 MG/1
TABLET ORAL
Qty: 90 TAB | Refills: 0 | Status: SHIPPED | OUTPATIENT
Start: 2020-12-07 | End: 2021-01-25

## 2020-12-11 NOTE — PROGRESS NOTES
[facility]  [address]  [place], [address]  [phone] fax    Physical Therapy Discharge Summary  Patient name: Joselyn Kang Start of Care: 10/8/2020   Referral source: Ebenezer Garcia MD : 1970               Medical Diagnosis: Low back pain [M54.5]  Payor: Loni Markham / Plan: [employer] / Product Type: HMO /  Onset Date: 1 month ago               Treatment Diagnosis: left LBP   Prior Hospitalization: see medical history Provider#: [id number]   Medications: Verified on Patient summary List    Comorbidities: depression, DM, HTN, asthma, Hx CVA, hx right LBP    Prior Level of Function: Independent with ADLs, functional, and work tasks with no left LBP. Visits from Start of Care: 2    Missed Visits: 1  Reporting Period : 10/8/2020 to 10/20/2020    Summary of Care:  Goal: Pt will report compliance and independence to [facility] to help the pt manage their pain and symptoms. Status at last note/certification: unable to reassess   Status at discharge: not met    Goal: Pt will increase FOTO score to 64 points to improve ability to perform ADLs. Status at last note/certification: unable to reassess   Status at discharge: not met    Goal: Pt will increase MMT B hip flex/ER/IR to 4+/5 to improve ability to tolerate prolonged standing. Status at last note/certification: unable to reassess   Status at discharge: not met    Goal: Pt will increase AROM l/s EXT to % of WNL, right SB to 50-75% of WNL with minimal to no pain in the low back to improve ability to perform work tasks with more ease. Status at last note/certification: unable to reassess   Status at discharge: not met    Goal: Pt will report having no limitations with walking several blocks to improve ability to return to recreational activities.    Status at last note/certification: unable to reassess   Status at discharge: not met    ASSESSMENT/RECOMMENDATIONS:  Pt was seen for 2 therapy sessions. Per telephone log, the pt NS her last scheduled therapy appointment and did not return to therapy. Pt is therefore d/c'ed from therapy and unable to reassess goals at this time.    [x]Discontinue therapy: []Patient has reached or is progressing toward set goals      [x]Patient is non-compliant or has abdicated      []Due to lack of appreciable progress towards set goals    Hyacinth Najera, PT 12/11/2020 3:35 PM

## 2021-01-30 NOTE — PROGRESS NOTES
Rosario Curtis is a 48 y.o. female who was seen by synchronous (real-time) audio-video technology on 2/8/2021 for Diabetes, Cholesterol Problem, Blood Pressure Check, and Weight Management        Assessment & Plan:   Diagnoses and all orders for this visit:    1. Primary hypertension    2. Uncontrolled type 2 diabetes mellitus with microalbuminuria    3. Mild intermittent asthma without complication    4. JOLIE and PLMD Dr. Jairo Schreiber 2005    5. Severe obesity (BMI 35.0-39. 9) with comorbidity ([address]    6. Dyslipidemia        I spent at least 21 minutes on this visit with this established patient. 712  Subjective:       Objective:   No flowsheet data found. General: alert, cooperative, no distress   Mental  status: normal mood, behavior, speech, dress, motor activity, and thought processes, able to follow commands   HENT: NCAT   Neck: no visualized mass   Resp: no respiratory distress   Neuro: no gross deficits   Skin: no discoloration or lesions of concern on visible areas   Psychiatric: normal affect, consistent with stated mood, no evidence of hallucinations     Additional exam findings: We discussed the expected course, resolution and complications of the diagnosis(es) in detail. Medication risks, benefits, costs, interactions, and alternatives were discussed as indicated. I advised her to contact the office if her condition worsens, changes or fails to improve as anticipated. She expressed understanding with the diagnosis(es) and plan. Rosario Curtis, who was evaluated through a patient-initiated, synchronous (real-time) audio-video encounter, and/or her healthcare decision maker, is aware that it is a billable service, with coverage as determined by her insurance carrier. She provided verbal consent to proceed: Yes, and patient identification was verified.  It was conducted pursuant to the emergency declaration under the [address] [facility], [address] waiver authority and the [employer] Act. A caregiver was present when appropriate. Ability to conduct physical exam was limited. I was in the office. The patient was at home. Richar Salazar MD    She was recently dx'ed with apparent sinus infection by urgent care. She received amox then zpak. She also has coexisting tooth abscess in 2 spots and currently being seen at [facility] and also [address] [facility]    She has been having slight asthma flare, is taking her symbicort and prn alb. We discussed possibly giving her pred but she declined as it causes her so many sfx apparently. She looks comfortable, no actual sob    No cardiovascular complaints. Denies polyuria, polydipsia, nocturia, vision change. Not checking sugars at this time.  She had declined insulin in the past.  Admits that the diet is off and weight is up    Past Medical History:   Diagnosis Date    Acanthosis nigricans     Allergic rhinitis     Anemia, iron deficiency     from menorrhagia    Anxiety     Asthma     Chronic anemia     Dr Lillian Ha; thalassemia    Depression     Diabetes mellitus ([address] [id number]    History of CVA (cerebrovascular accident)     no deficits    Hyperlipidemia     Hypertension     Hypovitaminosis D 10/11    Menorrhagia 2012    s/p endometrial ablation 3/16    Morbid obesity ([address]     dec dharmesh supp, med sup wt loss, bariatrics; IF 3/18 start weight 226 lbs but not doing    JOLIE (obstructive sleep apnea)     Dr. Lloyd Combcharles cpap    Spinal stenosis of lumbar region with neurogenic claudication      Past Surgical History:   Procedure Laterality Date    HX  SECTION      x2    HX COLONOSCOPY      Dr Lillian Ha 6/10 negative    HX GI  6/10    neg EGD Dr Lillian Ha    HX GYN      cyst removal    HX GYN      LEEP    HX HYSTEROSCOPY WITH ENDOMETRIAL ABLATION  3/16    Dr Cristiana Cruz     Social History     Socioeconomic History    Marital status:      Spouse name: Not on file    Number of children: 2    Years of education: Not on file    Highest education level: Not on file   Occupational History    Occupation: works at [employer] resource strain: Not on file    Food insecurity     Worry: Not on file     Inability: Not on file   [employer] needs     Medical: Not on file     Non-medical: Not on file   Tobacco Use    Smoking status: Never Smoker    Smokeless tobacco: Never Used   Substance and Sexual Activity    Alcohol use: Yes     Comment: social drinker    Drug use: No    Sexual activity: Not on file   Lifestyle    Physical activity     Days per week: Not on file     Minutes per session: Not on file    Stress: Not on file   Relationships    Social connections     Talks on phone: Not on file     Gets together: Not on file     Attends Sabianism service: Not on file     Active member of club or organization: Not on file     Attends meetings of clubs or organizations: Not on file     Relationship status: Not on file    Intimate partner violence     Fear of current or ex partner: Not on file     Emotionally abused: Not on file     Physically abused: Not on file     Forced sexual activity: Not on file   Other Topics Concern    Not on file   Social History Narrative    Not on file     Current Outpatient Medications   Medication Sig    pioglitazone (ACTOS) 15 mg tablet Take 2 tablets by mouth once daily    citalopram (CELEXA) 20 mg tablet Take 1 tablet by mouth once daily    amLODIPine (NORVASC) 5 mg tablet Take 1 tablet by mouth once daily    atorvastatin (LIPITOR) 20 mg tablet Take 1 tablet by mouth once daily    spironolactone (ALDACTONE) 25 mg tablet Take 1 tablet by mouth once daily    glimepiride (AMARYL) 4 mg tablet TAKE 1 TABLET BY MOUTH EVERY MORNING    metFORMIN ER (GLUCOPHAGE XR) 500 mg tablet TAKE 2 TABLETS BY MOUTH ONCE DAILY WITH SUPPER    cyclobenzaprine (FLEXERIL) 10 mg tablet Take 1 Tab by mouth three (3) times daily as needed for Muscle Spasm(s).  methylPREDNISolone (MEDROL DOSEPACK) 4 mg tablet Per dose pack instructions    gabapentin (NEURONTIN) 100 mg capsule Take 150 mg by mouth two (2) times daily as needed for Pain.  candesartan (ATACAND) 16 mg tablet Take 1 tablet by mouth once daily    chlorthalidone (HYGROTEN) 25 mg tablet Take 1 tablet by mouth once daily    Bydureon BCise 2 mg/0.85 mL atIn INJECT 2 MILLIGRAMS BY SUBCUTANEOUS ROUTE EVERY SEVEN (7) DAYS    budesonide-formoteroL (Symbicort) 160-4.5 mcg/actuation HFAA Take 2 Puffs by inhalation two (2) times a day.  albuterol (PROVENTIL HFA, VENTOLIN HFA, PROAIR HFA) 90 mcg/actuation inhaler Take 1-2 Puffs by inhalation every four (4) hours as needed for Wheezing.  ergocalciferol (VITAMIN D2) 1,250 mcg (50,000 unit) capsule Take 1 Cap by mouth every seven (7) days.  pregabalin (LYRICA) 150 mg capsule Take 1 Cap by mouth two (2) times a day. Max Daily Amount: 300 mg.    KLOR-CON M20 20 mEq tablet TAKE 1 TABLET BY MOUTH ONCE DAILY    Blood-Glucose Meter monitoring kit Pt checks blood sugar once daily, Dx E11.9    Lancets misc Pt checks blood sugar once daily, Dx E11.9    glucose blood VI test strips (BLOOD GLUCOSE TEST) strip Pt checks blood sugar once daily, Dx e11.9     No current facility-administered medications for this visit.       Allergies   Allergen Reactions    Penicillins Unknown (comments)     Tongue swelling; difficulty swallowing; thrush    Compazine [Prochlorperazine] Unknown (comments)     Paranoia    Invokana [Canagliflozin] Other (comments)     Yeast infections    Laurens Swelling     Swelling and itching of mouth, lips, and tongue     REVIEW OF SYSTEMS: Dr Bret Ling 2017, mammo 2/13, sees [facility], colo 2010  Ophtho  no vision change or eye pain  Oral  no mouth pain, tongue or tooth problems  Ears  no hearing loss, ear pain, fullness, no swallowing problems  Cardiac  no CP, PND, orthopnea, edema, palpitations or syncope  Chest  no breast masses  Resp  no wheezing, chronic coughing, dyspnea  GI  no heartburn, nausea, vomiting, change in bowel habits, bleeding, hemorrhoids  Urinary  no dysuria, hematuria, flank pain, urgency, frequency    LABS  From 10/11 showed                   hba1c 11.1, ldl-p 2182, chol 166, tg 139, hdl 39, ldl-c 99,                  vit d 16.5  From 12/11 showed gluc 111, cr 0.68,              alt10,                     ldl-p 1980, chol 178, tg 104, hdl 40, ldl-c 117  From 3/12 showed                   hba1c 6.4,                   wbc 4.4, hb 11.0, plt 306,             vit d 41.6, %sat 10, ferritin 20, spep neg, fol 12.5, b12 665  From 11/12 showed gluc 196, cr 0.50,              alt 27, hba1c 9.1,  ldl-p 1745, chol 154, tg 146, hdl 41, ldl-c 84  From 7/13 showed   gluc 157, cr 0.50, gfr>60, alt 18, hba1c 8.8,   ldl-p 1017, chol 119, tg 102, hdl 39, ldl-c 60  From 8/14 showed   gluc 137, cr 0.50, gfr>60, alt 15, hba1c 7.3,       chol 118, tg 92,   hdl 34, ldl-c 66,   wbc 3.1, hb 8.9,   plt 236, umar 17.6  From 12/15 showed gluc 124, cr 0.60, gfr>60, alt 23, hba1c 6.7,       chol 120, tg 161, hdl 34, ldl-c 54,   wbc 4.5, hb 10.2, plt 274,             vit d 13.9, tsh 2.12  From 5/16 showed   gluc 121, cr 0.70, gfr>60, alt 27  From 7/16 showed        hba1c 7.4,       chol 134, tg 127, hdl 40, ldl-c 69  From 11/17 showed gluc 260, cr 1.06, gfr>60, alt 32, hba1c 9.1,       chol 143, tg 180, hdl 33, ldl-c 74,   wbc 3.5, hb 10.4, plt 245, umar 72.5, vit d 12.3  From 3/18 showed   gluc 182, cr 0.60, gfr>60,    hba1c 8.9,       chol 163, tg 105, hdl 41, ldl-c 101, wbc 3.6, hb 10.5, plt 276, camacho  70.2, vit d 19.2  From 10/18 showed gluc 206, cr 1.25, gfr 56,                               ck/trop neg, ua neg  From 3/19 showed   gluc 85,   cr 0.60, gfr>60,    hba1c 6.8,       chol 149, tg 100, hdl 45, ldl-c 84,             umar 60.8, vit d 37.0  From 9/19 showed   gluc 85,   cr 0.60, gfr>60,    hba1c 8.0,      chol 153, tg 198, hdl 41, ldl-c 85,   wbc 3.2, hb 10.6, plt 277, umar 31.6      tsh 1.28, ft4 1.10, b12>2k, fol>20  From 1/30 showed        hba1c 8.7  From 6/20 showed   gluc 127, cr 0.60, gfr>60, alt 21, hba1c 7.6,      chol 145, tg 143, hdl 37, ldl-c 80,   wbc 4.0, hb 10.4, plt 223, umar 18.8    We reviewed the patient's labs from the last several visits to point out trends in the numbers            Patient Active Problem List   Diagnosis Code    Asthma J45.909    JOLIE and PLMD Dr. Cody Lundy 2005 G47.33    Hypovitaminosis D E55.9    Dyslipidemia E78.5    Primary hypertension I10    Uncontrolled type 2 diabetes mellitus with microalbuminuria E11.29, E11.65, R80.9    Severe obesity (BMI 35.0-39. 9) with comorbidity ([facility]) E66.01    Mild episode of recurrent major depressive disorder ([facility] F33.0    Anxiety F41.9    Chronic anemia D64.9     Assessment and plan:  1. Diabetes. She will get labs done and will proceed with plans thereafter. Another baylee discussion about injectables  2. Hyperlipidemia. Continue current regimen. 3. Hypovitaminosis D. Supplementation  4. Sleep apnea. F/u Dr. Cody Lundy  5. Morbid obesity. Lifestyle and dietary measures. Portion control reiterated. discussed bariatrics and dharmesh suppressants. We talked at length about Noom as well, too expensive  6. Hypertension. Continue current regimen. 7. Asthma. Again, she declined pred taper  8. Anemia. F/U Dr Helga dennis  9. Tooth abscess. F/u dental/orthodontic          RTC 6/21    Above conditions discussed at length and patient vocalized understanding.   All questions answered to patient satisfaction

## 2021-02-08 ENCOUNTER — VIRTUAL VISIT (OUTPATIENT)
Dept: INTERNAL MEDICINE CLINIC | Age: 51
End: 2021-02-08
Payer: COMMERCIAL

## 2021-02-08 DIAGNOSIS — E78.5 DYSLIPIDEMIA: ICD-10-CM

## 2021-02-08 DIAGNOSIS — J45.20 MILD INTERMITTENT ASTHMA WITHOUT COMPLICATION: ICD-10-CM

## 2021-02-08 DIAGNOSIS — G47.33 OSA (OBSTRUCTIVE SLEEP APNEA): ICD-10-CM

## 2021-02-08 DIAGNOSIS — I10 PRIMARY HYPERTENSION: Primary | ICD-10-CM

## 2021-02-08 DIAGNOSIS — E66.01 SEVERE OBESITY (BMI 35.0-39.9) WITH COMORBIDITY (HCC): ICD-10-CM

## 2021-02-08 PROCEDURE — 99214 OFFICE O/P EST MOD 30 MIN: CPT | Performed by: INTERNAL MEDICINE

## 2021-02-09 ENCOUNTER — TELEPHONE (OUTPATIENT)
Dept: INTERNAL MEDICINE CLINIC | Age: 51
End: 2021-02-09

## 2021-02-09 NOTE — TELEPHONE ENCOUNTER
[facility] re:     Per Dr Tammie Ramirez f/u appt neeed:   Return in about 4 months (around 6/8/2021).     He also wants labs prior

## 2021-02-12 ENCOUNTER — TELEPHONE (OUTPATIENT)
Dept: INTERNAL MEDICINE CLINIC | Age: 51
End: 2021-02-12

## 2021-02-12 DIAGNOSIS — R05.9 COUGH: ICD-10-CM

## 2021-02-12 DIAGNOSIS — R06.2 WHEEZING: Primary | ICD-10-CM

## 2021-02-12 RX ORDER — PREDNISONE 10 MG/1
TABLET ORAL
Qty: 21 TAB | Refills: 0 | Status: SHIPPED | OUTPATIENT
Start: 2021-02-12 | End: 2021-04-27

## 2021-02-12 RX ORDER — BENZONATATE 100 MG/1
100 CAPSULE ORAL
Qty: 21 CAP | Refills: 0 | Status: SHIPPED | OUTPATIENT
Start: 2021-02-12 | End: 2021-02-19

## 2021-02-12 NOTE — TELEPHONE ENCOUNTER
Patient stating she had a VV with you recently and you told her that if the cough persisted you would call in something. Stating you had mentioned Prednisone. Wants to know you can send in Tessalon Pearls.

## 2021-02-22 ENCOUNTER — HOSPITAL ENCOUNTER (OUTPATIENT)
Dept: LAB | Age: 51
Discharge: HOME | End: 2021-02-22

## 2021-02-22 LAB — SENTARA SPECIMEN COL,SENBCF: NORMAL

## 2021-02-22 PROCEDURE — 99001 SPECIMEN HANDLING PT-LAB: CPT

## 2021-02-23 LAB
ANION GAP SERPL CALC-SCNC: 10 MMOL/L (ref 3–15)
AVG GLU, 10930: 142 MG/DL (ref 91–123)
BUN SERPL-MCNC: 18 MG/DL (ref 6–22)
CALCIUM SERPL-MCNC: 9.7 MG/DL (ref 8.4–10.5)
CHLORIDE SERPL-SCNC: 98 MMOL/L (ref 98–110)
CHOLEST SERPL-MCNC: 155 MG/DL (ref 110–200)
CO2 SERPL-SCNC: 29 MMOL/L (ref 20–32)
CREAT SERPL-MCNC: 0.7 MG/DL (ref 0.5–1.2)
CREATININE, URINE: 82 MG/DL
GFRAA, 66117: >60
GFRNA, 66118: >60
GLUCOSE SERPL-MCNC: 152 MG/DL (ref 70–99)
HBA1C MFR BLD HPLC: 6.6 % (ref 4.8–5.6)
HDLC SERPL-MCNC: 37 MG/DL
HDLC SERPL-MCNC: 4.2 MG/DL (ref 0–5)
LDL/HDL RATIO,LDHD: 2.5
LDLC SERPL CALC-MCNC: 90 MG/DL (ref 50–99)
MICROALB/CREAT RATIO, 140286: NORMAL
MICROALBUMIN,URINE RANDOM 140054: <12 MG/L (ref 0.1–17)
NON-HDL CHOLESTEROL, 011976: 118 MG/DL
POTASSIUM SERPL-SCNC: 3.9 MMOL/L (ref 3.5–5.5)
SODIUM SERPL-SCNC: 137 MMOL/L (ref 133–145)
TRIGL SERPL-MCNC: 141 MG/DL (ref 40–149)
VLDLC SERPL CALC-MCNC: 28 MG/DL (ref 8–30)

## 2021-02-26 ENCOUNTER — TELEPHONE (OUTPATIENT)
Dept: INTERNAL MEDICINE CLINIC | Age: 51
End: 2021-02-26

## 2021-02-26 NOTE — TELEPHONE ENCOUNTER
pls call    Results for orders placed or performed in visit on 12/99/65   METABOLIC PANEL, BASIC   Result Value Ref Range    Glucose 152 (H) 70 - 99 mg/dL    BUN 18 6 - 22 mg/dL    Creatinine 0.7 0.5 - 1.2 mg/dL    Sodium 137 133 - 145 mmol/L    Potassium 3.9 3.5 - 5.5 mmol/L    Chloride 98 98 - 110 mmol/L    CO2 29 20 - 32 mmol/L    Calcium 9.7 8.4 - 10.5 mg/dL    Anion gap 10.0 3.0 - 15.0 mmol/L    GFRAA >60.0 >60.0    GFRNA >60.0 >60.0   LIPID PANEL   Result Value Ref Range    Triglyceride 141 40 - 149 mg/dL    HDL Cholesterol 37 (L) >=40 mg/dL    Cholesterol, total 155 110 - 200 mg/dL    CHOLESTEROL/HDL 4.2 0.0 - 5.0    Non-HDL Cholesterol 118 <130 mg/dL    LDL, calculated 90 50 - 99 mg/dL    VLDL, calculated 28 8 - 30 mg/dL    LDL/HDL Ratio 2.5    HEMOGLOBIN A1C W/O EAG   Result Value Ref Range    Hemoglobin A1c 6.6 (H) 4.8 - 5.6 %    AVG  (H) 91 - 123 mg/dL   MICROALBUMIN, UR, RAND W/ MICROALB/CREAT RATIO   Result Value Ref Range    Creatinine, urine 82 mg/dL    Microalbumin, urine <12.0 0.1 - 17.0 mg/L    Microalb/Creat ratio (ug/mg creat.)       a1c much improved!   Cmp, umar ok  Continue current

## 2021-04-27 ENCOUNTER — VIRTUAL VISIT (OUTPATIENT)
Dept: INTERNAL MEDICINE CLINIC | Age: 51
End: 2021-04-27
Payer: COMMERCIAL

## 2021-04-27 DIAGNOSIS — J45.41 MODERATE PERSISTENT ASTHMA WITH EXACERBATION: Primary | ICD-10-CM

## 2021-04-27 DIAGNOSIS — R05.9 COUGH: ICD-10-CM

## 2021-04-27 DIAGNOSIS — J45.20 MILD INTERMITTENT ASTHMA WITHOUT COMPLICATION: ICD-10-CM

## 2021-04-27 DIAGNOSIS — R06.2 WHEEZING: ICD-10-CM

## 2021-04-27 DIAGNOSIS — B37.31 YEAST VAGINITIS: ICD-10-CM

## 2021-04-27 PROCEDURE — 99443 PR PHYS/QHP TELEPHONE EVALUATION 21-30 MIN: CPT | Performed by: INTERNAL MEDICINE

## 2021-04-27 RX ORDER — FLUCONAZOLE 150 MG/1
150 TABLET ORAL DAILY
Qty: 1 TAB | Refills: 0 | Status: SHIPPED | OUTPATIENT
Start: 2021-04-27 | End: 2021-11-08

## 2021-04-27 RX ORDER — BUDESONIDE AND FORMOTEROL FUMARATE DIHYDRATE 160; 4.5 UG/1; UG/1
2 AEROSOL RESPIRATORY (INHALATION) 2 TIMES DAILY
Qty: 1 INHALER | Refills: 11 | Status: SHIPPED | OUTPATIENT
Start: 2021-04-27

## 2021-04-27 RX ORDER — PREDNISONE 20 MG/1
20 TABLET ORAL 2 TIMES DAILY
Qty: 10 TAB | Refills: 0 | Status: SHIPPED | OUTPATIENT
Start: 2021-04-27 | End: 2021-06-14

## 2021-04-27 RX ORDER — ALBUTEROL SULFATE 90 UG/1
1-2 AEROSOL, METERED RESPIRATORY (INHALATION)
Qty: 1 INHALER | Refills: 11 | Status: SHIPPED | OUTPATIENT
Start: 2021-04-27

## 2021-04-27 RX ORDER — BENZONATATE 200 MG/1
200 CAPSULE ORAL
Qty: 21 CAP | Refills: 1 | Status: SHIPPED | OUTPATIENT
Start: 2021-04-27 | End: 2022-05-20

## 2021-04-27 NOTE — PROGRESS NOTES
Flor Clayton is a 48 y.o. female, evaluated via audio-only technology on 4/27/2021 for No chief complaint on file. .    Assessment & Plan:   Diagnoses and all orders for this visit:    1. Moderate persistent asthma with exacerbation  -     predniSONE (DELTASONE) 20 mg tablet; Take 20 mg by mouth two (2) times a day. 2. Wheezing  -     predniSONE (DELTASONE) 20 mg tablet; Take 20 mg by mouth two (2) times a day. 3. Cough  -     benzonatate (TESSALON) 200 mg capsule; Take 1 Cap by mouth three (3) times daily as needed for Cough for up to 7 days. 4. Yeast vaginitis  -     fluconazole (DIFLUCAN) 150 mg tablet; Take 1 Tab by mouth daily for 1 day. FDA advises cautious prescribing of oral fluconazole in pregnancy. 5. Mild intermittent asthma without complication  -     budesonide-formoteroL (Symbicort) 160-4.5 mcg/actuation HFAA; Take 2 Puffs by inhalation two (2) times a day. -     albuterol (PROVENTIL HFA, VENTOLIN HFA, PROAIR HFA) 90 mcg/actuation inhaler; Take 1-2 Puffs by inhalation every four (4) hours as needed for Wheezing. 12  Subjective:       Flor Clayton, who was evaluated through a patient-initiated, synchronous (real-time) audio only encounter, and/or her healthcare decision maker, is aware that it is a billable service, with coverage as determined by her insurance carrier. She provided verbal consent to proceed: Yes. She has not had a related appointment within my department in the past 7 days or scheduled within the next 24 hours. Total Time: minutes: 11-20 minutes    Richard Garcia MD     She feels she is having another asthma exacerbation. She had one about 2 months ago or so, cleared up rapidly. This most recent one started a few days ago, describes increased wheezing and shortness of breath, incessant coughing, worse at night. No actual fevers, sputum production, does not feel unwell otherwise, no known exposures.   No chest pain, pleurisy, leg swelling. Past Medical History:   Diagnosis Date    Acanthosis nigricans 7/13    Allergic rhinitis     Anemia, iron deficiency     from menorrhagia    Anxiety     Asthma     Chronic anemia     Dr Shanna Ramirez; thalassemia    Depression     Diabetes mellitus ([facility] [id number]    History of CVA (cerebrovascular accident) 1996    no deficits    Hyperlipidemia     Hypertension     Hypovitaminosis D 10/11    Menorrhagia 03/2012    s/p endometrial ablation 3/16    Morbid obesity ([facility]     dec dharmesh supp, med sup wt loss, bariatrics; IF 3/18 start weight 226 lbs but not doing    JOLIE (obstructive sleep apnea)     Dr. Karena Sauceda cpap    Spinal stenosis of lumbar region with neurogenic claudication      Current Outpatient Medications   Medication Sig    predniSONE (DELTASONE) 20 mg tablet Take 20 mg by mouth two (2) times a day.  benzonatate (TESSALON) 200 mg capsule Take 1 Cap by mouth three (3) times daily as needed for Cough for up to 7 days.  fluconazole (DIFLUCAN) 150 mg tablet Take 1 Tab by mouth daily for 1 day. FDA advises cautious prescribing of oral fluconazole in pregnancy.  budesonide-formoteroL (Symbicort) 160-4.5 mcg/actuation HFAA Take 2 Puffs by inhalation two (2) times a day.  albuterol (PROVENTIL HFA, VENTOLIN HFA, PROAIR HFA) 90 mcg/actuation inhaler Take 1-2 Puffs by inhalation every four (4) hours as needed for Wheezing.     Bydureon BCise 2 mg/0.85 mL atIn INJECT 2 MILLIGRAMS BENEATH THE SKIN EVERY 7 DAYS    ergocalciferol (ERGOCALCIFEROL) 1,250 mcg (50,000 unit) capsule Take 1 capsule by mouth once a week    pioglitazone (ACTOS) 15 mg tablet Take 2 tablets by mouth once daily    citalopram (CELEXA) 20 mg tablet Take 1 tablet by mouth once daily    amLODIPine (NORVASC) 5 mg tablet Take 1 tablet by mouth once daily    atorvastatin (LIPITOR) 20 mg tablet Take 1 tablet by mouth once daily    spironolactone (ALDACTONE) 25 mg tablet Take 1 tablet by mouth once daily    glimepiride (AMARYL) 4 mg tablet TAKE 1 TABLET BY MOUTH EVERY MORNING    metFORMIN ER (GLUCOPHAGE XR) 500 mg tablet TAKE 2 TABLETS BY MOUTH ONCE DAILY WITH SUPPER    cyclobenzaprine (FLEXERIL) 10 mg tablet Take 1 Tab by mouth three (3) times daily as needed for Muscle Spasm(s).  gabapentin (NEURONTIN) 100 mg capsule Take 150 mg by mouth two (2) times daily as needed for Pain.  candesartan (ATACAND) 16 mg tablet Take 1 tablet by mouth once daily    chlorthalidone (HYGROTEN) 25 mg tablet Take 1 tablet by mouth once daily    pregabalin (LYRICA) 150 mg capsule Take 1 Cap by mouth two (2) times a day. Max Daily Amount: 300 mg.    KLOR-CON M20 20 mEq tablet TAKE 1 TABLET BY MOUTH ONCE DAILY    Blood-Glucose Meter monitoring kit Pt checks blood sugar once daily, Dx E11.9    Lancets misc Pt checks blood sugar once daily, Dx E11.9    glucose blood VI test strips (BLOOD GLUCOSE TEST) strip Pt checks blood sugar once daily, Dx e11.9     No current facility-administered medications for this visit. Allergies   Allergen Reactions    Penicillins Unknown (comments)     Tongue swelling; difficulty swallowing; thrush    Compazine [Prochlorperazine] Unknown (comments)     Paranoia    Invokana [Canagliflozin] Other (comments)     Yeast infections    Freedom Swelling     Swelling and itching of mouth, lips, and tongue     Assessment and plan:  1. Asthma exacerbation. Prednisone, Tessalon, refilled her Symbicort and albuterol. Call if no improvement or worsening complaints  2. Diabetes. She will be watchful of her sugars on prednisone  3. She requested Diflucan as she generally has yeast vaginitis with steroid treatment        Above conditions discussed at length and patient vocalized understanding.   All questions answered to patient satisfaction

## 2021-06-07 ENCOUNTER — HOSPITAL ENCOUNTER (OUTPATIENT)
Dept: LAB | Age: 51
Discharge: HOME | End: 2021-06-07

## 2021-06-07 ENCOUNTER — TELEPHONE (OUTPATIENT)
Dept: INTERNAL MEDICINE CLINIC | Age: 51
End: 2021-06-07

## 2021-06-07 LAB — SENTARA SPECIMEN COL,SENBCF: NORMAL

## 2021-06-07 PROCEDURE — 99001 SPECIMEN HANDLING PT-LAB: CPT

## 2021-06-08 LAB
ANION GAP SERPL CALC-SCNC: 12 MMOL/L (ref 3–15)
AVG GLU, 10930: 162 MG/DL (ref 91–123)
BUN SERPL-MCNC: 16 MG/DL (ref 6–22)
CALCIUM SERPL-MCNC: 9.7 MG/DL (ref 8.4–10.5)
CHLORIDE SERPL-SCNC: 99 MMOL/L (ref 98–110)
CHOLEST SERPL-MCNC: 164 MG/DL (ref 110–200)
CO2 SERPL-SCNC: 27 MMOL/L (ref 20–32)
CREAT SERPL-MCNC: 0.7 MG/DL (ref 0.5–1.2)
ERYTHROCYTE [DISTWIDTH] IN BLOOD BY AUTOMATED COUNT: 17.2 % (ref 10–15.5)
GFRAA, 66117: >60
GFRNA, 66118: >60
GLUCOSE SERPL-MCNC: 157 MG/DL (ref 70–99)
HBA1C MFR BLD HPLC: 7.3 % (ref 4.8–5.6)
HCT VFR BLD AUTO: 35.5 % (ref 35.1–48)
HDLC SERPL-MCNC: 39 MG/DL
HDLC SERPL-MCNC: 4.2 MG/DL (ref 0–5)
HGB BLD-MCNC: 10.1 G/DL (ref 11.7–16)
LDL/HDL RATIO,LDHD: 2.6
LDLC SERPL CALC-MCNC: 99 MG/DL (ref 50–99)
MCH RBC QN AUTO: 22 PG (ref 26–34)
MCHC RBC AUTO-ENTMCNC: 29 G/DL (ref 31–36)
MCV RBC AUTO: 78 FL (ref 81–99)
NON-HDL CHOLESTEROL, 011976: 125 MG/DL
PLATELET # BLD AUTO: 271 K/UL (ref 140–440)
PMV BLD AUTO: 11.6 FL (ref 9–13)
POTASSIUM SERPL-SCNC: 3.6 MMOL/L (ref 3.5–5.5)
RBC # BLD AUTO: 4.56 M/UL (ref 3.8–5.2)
SODIUM SERPL-SCNC: 138 MMOL/L (ref 133–145)
TRIGL SERPL-MCNC: 132 MG/DL (ref 40–149)
VLDLC SERPL CALC-MCNC: 26 MG/DL (ref 8–30)
WBC # BLD AUTO: 3.1 K/UL (ref 4–11)

## 2021-06-08 NOTE — PROGRESS NOTES
48 y.o. BLACK/ female who presents for evaluation. She continues to have intermittent issues with the asthma. Reports that she uses the symbicort regularly but the rescue medicine' does not work' when she uses it. No actual current shortness of breath, wheezing, she does have intermittent coughing. She is requesting pulmonary evaluation    No cardiovascular complaints. Not exercising much, more motivational and work schedule than anything. Denies polyuria, polydipsia, nocturia, vision change. Not checking sugars at this time. Admits the diet is off, weight is up as below. We had discussed insulin last 2-3 visits at least, she might be open to it now. We did suggest talking to the Pharm. D. and see if any recommendations can be given otherwise. She has been having some fluctuations in her vision and will be scheduling with ophthalmologist.    She is due for colonoscopy, no GI or  complaints    She is going to schedule with Dr. Florian Skinner    Regarding her back and sciatica issues.     Past Medical History:   Diagnosis Date    Acanthosis nigricans     Allergic rhinitis     Anemia, iron deficiency     from menorrhagia    Anxiety     Asthma     Chronic anemia     Dr Trinh Wright; thalassemia    Depression     Diabetes mellitus ([facility] [address] [id number]    History of CVA (cerebrovascular accident)     no deficits    Hyperlipidemia     Hypertension     Hypovitaminosis D 10/11    Menorrhagia 2012    s/p endometrial ablation 3/16    Morbid obesity ([facility] [address]     dec dharmesh supp, med sup wt loss, bariatrics; IF 3/18 start weight 226 lbs but not doing    JOLIE (obstructive sleep apnea)     Dr. Bridger Hernandez cpap    Spinal stenosis of lumbar region with neurogenic claudication      Past Surgical History:   Procedure Laterality Date    HX  SECTION      x2    HX COLONOSCOPY      Dr Trinh Wright 6/10 negative    HX GI  6/10    neg EGD Dr Trinh Wright    HX GYN      cyst removal    HX GYN      LEEP    HX HYSTEROSCOPY WITH ENDOMETRIAL ABLATION  3/16    Dr Radha Kidd     Social History     Socioeconomic History    Marital status:      Spouse name: Not on file    Number of children: 2    Years of education: Not on file    Highest education level: Not on file   Occupational History    Occupation: works at [employer] Use    Smoking status: Never Smoker    Smokeless tobacco: Never Used   Substance and Sexual Activity    Alcohol use: Yes     Comment: social drinker    Drug use: No    Sexual activity: Not on file   Other Topics Concern    Not on file   Social History Narrative    Not on file     Social Determinants of Health     Financial Resource Strain:     Difficulty of Paying Living Expenses:    Food Insecurity:     Worried About [address] in the Last Year:     [address] in the Last Year:    Transportation Needs:     Lack of Transportation (Medical):  Lack of Transportation (Non-Medical):    Physical Activity:     Days of Exercise per Week:     Minutes of Exercise per Session:    Stress:     Feeling of Stress :    Social Connections:     Frequency of Communication with Friends and Family:     Frequency of Social Gatherings with Friends and Family:     Attends Mu-ism Services:     Active Member of Clubs or Organizations:     Attends Club or Organization Meetings:     Marital Status:    Intimate Partner Violence:     Fear of Current or Ex-Partner:     Emotionally Abused:     Physically Abused:     Sexually Abused:      Current Outpatient Medications   Medication Sig    budesonide-formoteroL (Symbicort) 160-4.5 mcg/actuation HFAA Take 2 Puffs by inhalation two (2) times a day.  albuterol (PROVENTIL HFA, VENTOLIN HFA, PROAIR HFA) 90 mcg/actuation inhaler Take 1-2 Puffs by inhalation every four (4) hours as needed for Wheezing.     Bydureon BCise 2 mg/0.85 mL atIn INJECT 2 MILLIGRAMS BENEATH THE SKIN EVERY 7 DAYS    pioglitazone (ACTOS) 15 mg tablet Take 2 tablets by mouth once daily    citalopram (CELEXA) 20 mg tablet Take 1 tablet by mouth once daily    amLODIPine (NORVASC) 5 mg tablet Take 1 tablet by mouth once daily    atorvastatin (LIPITOR) 20 mg tablet Take 1 tablet by mouth once daily    spironolactone (ALDACTONE) 25 mg tablet Take 1 tablet by mouth once daily    glimepiride (AMARYL) 4 mg tablet TAKE 1 TABLET BY MOUTH EVERY MORNING    metFORMIN ER (GLUCOPHAGE XR) 500 mg tablet TAKE 2 TABLETS BY MOUTH ONCE DAILY WITH SUPPER    candesartan (ATACAND) 16 mg tablet Take 1 tablet by mouth once daily    chlorthalidone (HYGROTEN) 25 mg tablet Take 1 tablet by mouth once daily    KLOR-CON M20 20 mEq tablet TAKE 1 TABLET BY MOUTH ONCE DAILY    Blood-Glucose Meter monitoring kit Pt checks blood sugar once daily, Dx E11.9    Lancets misc Pt checks blood sugar once daily, Dx E11.9    glucose blood VI test strips (BLOOD GLUCOSE TEST) strip Pt checks blood sugar once daily, Dx e11.9     No current facility-administered medications for this visit.      Allergies   Allergen Reactions    Penicillins Unknown (comments)     Tongue swelling; difficulty swallowing; thrush    Compazine [Prochlorperazine] Unknown (comments)     Paranoia    Invokana [Canagliflozin] Other (comments)     Yeast infections    [place] Swelling     Swelling and itching of mouth, lips, and tongue     REVIEW OF SYSTEMS: Dr Noris Hardwick 2017, mammo 2/13, sees [facility], colo 2010  Ophtho - no vision change or eye pain  Oral - no mouth pain, tongue or tooth problems  Ears - no hearing loss, ear pain, fullness, no swallowing problems  Cardiac - no CP, PND, orthopnea, edema, palpitations or syncope  Chest - no breast masses  Resp - no wheezing, chronic coughing, dyspnea  GI - no heartburn, nausea, vomiting, change in bowel habits, bleeding, hemorrhoids  Urinary - no dysuria, hematuria, flank pain, urgency, frequency    Visit Vitals  /84 (BP 1 Location: Right arm, BP Patient Position: Sitting, BP Cuff Size: Adult)   Pulse 90   Temp 97.3 °F (36.3 °C) (Temporal)   Resp 18   Ht 5' 6\" (1.676 m)   Wt 244 lb (110.7 kg)   SpO2 100%   BMI 39.38 kg/m²   A&O x3  Affect is appropriate. Mood stable  No apparent distress  Anicteric, no JVD, adenopathy or thyromegaly. No carotid bruits or radiated murmur  Lungs clear to auscultation, no wheezes or rales  Heart showed regular rate and rhythm. No murmur, rubs, gallops  Abdomen soft nontender, no hepatosplenomegaly or masses. Extremities without edema.   Pulses 1-2+ symmetrically    LABS  From 10/11 showed                   hba1c 11.1, ldl-p 2182, chol 166, tg 139, hdl 39, ldl-c 99,                  vit d 16.5  From 12/11 showed gluc 111, cr 0.68,              alt10,                     ldl-p 1980, chol 178, tg 104, hdl 40, ldl-c 117  From 3/12 showed                   hba1c 6.4,                   wbc 4.4, hb 11.0, plt 306,             vit d 41.6, %sat 10, ferritin 20, spep neg, fol 12.5, b12 665  From 11/12 showed gluc 196, cr 0.50,              alt 27, hba1c 9.1,  ldl-p 1745, chol 154, tg 146, hdl 41, ldl-c 84  From 7/13 showed   gluc 157, cr 0.50, gfr>60, alt 18, hba1c 8.8,   ldl-p 1017, chol 119, tg 102, hdl 39, ldl-c 60  From 8/14 showed   gluc 137, cr 0.50, gfr>60, alt 15, hba1c 7.3,       chol 118, tg 92,   hdl 34, ldl-c 66,   wbc 3.1, hb 8.9,   plt 236, umar 17.6  From 12/15 showed gluc 124, cr 0.60, gfr>60, alt 23, hba1c 6.7,       chol 120, tg 161, hdl 34, ldl-c 54,   wbc 4.5, hb 10.2, plt 274,             vit d 13.9, tsh 2.12  From 5/16 showed   gluc 121, cr 0.70, gfr>60, alt 27  From 7/16 showed        hba1c 7.4,       chol 134, tg 127, hdl 40, ldl-c 69  From 11/17 showed gluc 260, cr 1.06, gfr>60, alt 32, hba1c 9.1,       chol 143, tg 180, hdl 33, ldl-c 74,   wbc 3.5, hb 10.4, plt 245, umar 72.5, vit d 12.3  From 3/18 showed   gluc 182, cr 0.60, gfr>60,    hba1c 8.9,       chol 163, tg 105, hdl 41, ldl-c 101, wbc 3.6, hb 10.5, plt 276, camacho  70.2, vit d 19.2  From 10/18 showed gluc 206, cr 1.25, gfr 56,                               ck/trop neg, ua neg  From 3/19 showed   gluc 85,   cr 0.60, gfr>60,    hba1c 6.8,       chol 149, tg 100, hdl 45, ldl-c 84,             umar 60.8, vit d 37.0  From 9/19 showed   gluc 85,   cr 0.60, gfr>60,    hba1c 8.0,      chol 153, tg 198, hdl 41, ldl-c 85,   wbc 3.2, hb 10.6, plt 277, umar 31.6      tsh 1.28, ft4 1.10, b12>2k, fol>20  From 1/30 showed        hba1c 8.7  From 6/20 showed   gluc 127, cr 0.60, gfr>60, alt 21, hba1c 7.6,      chol 145, tg 143, hdl 37, ldl-c 80,   wbc 4.0, hb 10.4, plt 223, umar 18.8  From 2/21 showed   gluc 152, cr 0.70, gfr>60,    hba1c 6.6, umar neg,    chol 155, tg 141, hdl 37, ldl-c 90    We reviewed the patient's labs from the last several visits to point out trends in the numbers            Patient Active Problem List   Diagnosis Code    Asthma J45.909    JOLIE and PLMD Dr. Carie Sánchez 2005 G47.33    Hypovitaminosis D E55.9    Dyslipidemia E78.5    Primary hypertension I10    Uncontrolled type 2 diabetes mellitus with microalbuminuria E11.29, E11.65, R80.9    Severe obesity (BMI 35.0-39. 9) with comorbidity (HCC) E66.01    Mild episode of recurrent major depressive disorder ([facility] [address] F33.0    Anxiety F41.9    Chronic anemia D64.9     Assessment and plan:  1. Diabetes. On actos, metformin, bydureon. Will send to [facility] for further discussion regarding options including dietary changes and insulin regimens  2. Hyperlipidemia. Inc lipitor to 40; she will tighten up the diet as well  3. Hypovitaminosis D. Supplementation  4. Sleep apnea. F/u Dr. Carie Sánchez  5. Morbid obesity. Lifestyle and dietary measures. Portion control reiterated. discussed bariatrics and dharmesh suppressants, previously. Looked up [facility] per her request which is basically higher dose ozempic. Will defer that option to [facility] as well as unsure as to the coverage  6. Hypertension. Continue current regimen. 7. Asthma.  Will ask opinion Dr Christine Cruz  8. Anemia. F/U Dr Colletta Shores  9. [place] to be scheduled Dr Srikanth Mcghee  10. She will sched with oph  11. She will sched with Dr Taylor Valera        RTC 10/21    Above conditions discussed at length and patient vocalized understanding.   All questions answered to patient satisfaction

## 2021-06-14 ENCOUNTER — OFFICE VISIT (OUTPATIENT)
Dept: INTERNAL MEDICINE CLINIC | Age: 51
End: 2021-06-14
Payer: COMMERCIAL

## 2021-06-14 VITALS
RESPIRATION RATE: 18 BRPM | HEIGHT: 66 IN | WEIGHT: 244 LBS | SYSTOLIC BLOOD PRESSURE: 134 MMHG | HEART RATE: 90 BPM | BODY MASS INDEX: 39.21 KG/M2 | DIASTOLIC BLOOD PRESSURE: 84 MMHG | OXYGEN SATURATION: 100 % | TEMPERATURE: 97.3 F

## 2021-06-14 DIAGNOSIS — J45.20 MILD INTERMITTENT ASTHMA WITHOUT COMPLICATION: ICD-10-CM

## 2021-06-14 DIAGNOSIS — E66.01 SEVERE OBESITY (BMI 35.0-39.9) WITH COMORBIDITY (HCC): ICD-10-CM

## 2021-06-14 DIAGNOSIS — I10 PRIMARY HYPERTENSION: Primary | ICD-10-CM

## 2021-06-14 DIAGNOSIS — D64.9 CHRONIC ANEMIA: ICD-10-CM

## 2021-06-14 DIAGNOSIS — Z12.11 SCREEN FOR COLON CANCER: ICD-10-CM

## 2021-06-14 DIAGNOSIS — E78.5 DYSLIPIDEMIA: ICD-10-CM

## 2021-06-14 PROCEDURE — 99215 OFFICE O/P EST HI 40 MIN: CPT | Performed by: INTERNAL MEDICINE

## 2021-06-14 PROCEDURE — 3051F HG A1C>EQUAL 7.0%<8.0%: CPT | Performed by: INTERNAL MEDICINE

## 2021-06-14 RX ORDER — ATORVASTATIN CALCIUM 40 MG/1
40 TABLET, FILM COATED ORAL DAILY
Qty: 90 TABLET | Refills: 3 | Status: SHIPPED | OUTPATIENT
Start: 2021-06-14 | End: 2022-06-14 | Stop reason: DRUGHIGH

## 2021-06-14 NOTE — PROGRESS NOTES
Patient is in the office today for a 4 month follow up. 1. Have you been to the ER, urgent care clinic since your last visit? Hospitalized since your last visit? No    2. Have you seen or consulted any other health care providers outside of the [address] [place] [address] since your last visit? Include any pap smears or colon screening.  No

## 2021-06-14 NOTE — PATIENT INSTRUCTIONS
High Blood Pressure: Care Instructions  Overview     It's normal for blood pressure to go up and down throughout the day. But if it stays up, you have high blood pressure. Another name for high blood pressure is hypertension. Despite what a lot of people think, high blood pressure usually doesn't cause headaches or make you feel dizzy or lightheaded. It usually has no symptoms. But it does increase your risk of stroke, heart attack, and other problems. You and your doctor will talk about your risks of these problems based on your blood pressure. Your doctor will give you a goal for your blood pressure. Your goal will be based on your health and your age. Lifestyle changes, such as eating healthy and being active, are always important to help lower blood pressure. You might also take medicine to reach your blood pressure goal.  Follow-up care is a key part of your treatment and safety. Be sure to make and go to all appointments, and call your doctor if you are having problems. It's also a good idea to know your test results and keep a list of the medicines you take. How can you care for yourself at home? Medical treatment  · If you stop taking your medicine, your blood pressure will go back up. You may take one or more types of medicine to lower your blood pressure. Be safe with medicines. Take your medicine exactly as prescribed. Call your doctor if you think you are having a problem with your medicine. · Talk to your doctor before you start taking aspirin every day. Aspirin can help certain people lower their risk of a heart attack or stroke. But taking aspirin isn't right for everyone, because it can cause serious bleeding. · See your doctor regularly. You may need to see the doctor more often at first or until your blood pressure comes down. · If you are taking blood pressure medicine, talk to your doctor before you take decongestants or anti-inflammatory medicine, such as ibuprofen.  Some of these medicines can raise blood pressure. · Learn how to check your blood pressure at home. Lifestyle changes  · Stay at a healthy weight. This is especially important if you put on weight around the waist. Losing even 10 pounds can help you lower your blood pressure. · If your doctor recommends it, get more exercise. Walking is a good choice. Bit by bit, increase the amount you walk every day. Try for at least 30 minutes on most days of the week. You also may want to swim, bike, or do other activities. · Avoid or limit alcohol. Talk to your doctor about whether you can drink any alcohol. · Try to limit how much sodium you eat to less than 2,300 milligrams (mg) a day. Your doctor may ask you to try to eat less than 1,500 mg a day. · Eat plenty of fruits (such as bananas and oranges), vegetables, legumes, whole grains, and low-fat dairy products. · Lower the amount of saturated fat in your diet. Saturated fat is found in animal products such as milk, cheese, and meat. Limiting these foods may help you lose weight and also lower your risk for heart disease. · Do not smoke. Smoking increases your risk for heart attack and stroke. If you need help quitting, talk to your doctor about stop-smoking programs and medicines. These can increase your chances of quitting for good. When should you call for help? Call  911 anytime you think you may need emergency care. This may mean having symptoms that suggest that your blood pressure is causing a serious heart or blood vessel problem. Your blood pressure may be over 180/120. For example, call 911 if:    · You have symptoms of a heart attack. These may include:  ? Chest pain or pressure, or a strange feeling in the chest.  ? Sweating. ? Shortness of breath. ? Nausea or vomiting. ? Pain, pressure, or a strange feeling in the back, neck, jaw, or upper belly or in one or both shoulders or arms. ? Lightheadedness or sudden weakness.   ? A fast or irregular heartbeat.     · You have symptoms of a stroke. These may include:  ? Sudden numbness, tingling, weakness, or loss of movement in your face, arm, or leg, especially on only one side of your body. ? Sudden vision changes. ? Sudden trouble speaking. ? Sudden confusion or trouble understanding simple statements. ? Sudden problems with walking or balance. ? A sudden, severe headache that is different from past headaches.     · You have severe back or belly pain. Do not wait until your blood pressure comes down on its own. Get help right away. Call your doctor now or seek immediate care if:    · Your blood pressure is much higher than normal (such as 180/120 or higher), but you don't have symptoms.     · You think high blood pressure is causing symptoms, such as:  ? Severe headache.  ? Blurry vision. Watch closely for changes in your health, and be sure to contact your doctor if:    · Your blood pressure measures higher than your doctor recommends at least 2 times. That means the top number is higher or the bottom number is higher, or both.     · You think you may be having side effects from your blood pressure medicine. Where can you learn more? Go to http://www.gray.com/  Enter [id number] in the search box to learn more about \"High Blood Pressure: Care Instructions. \"  Current as of: August 31, 2020               Content Version: 12.8  © 2006-2021 [employer]. Care instructions adapted under license by [employer] (which disclaims liability or warranty for this information). If you have questions about a medical condition or this instruction, always ask your healthcare professional. [place] [address] any warranty or liability for your use of this information.

## 2021-06-24 ENCOUNTER — TELEPHONE (OUTPATIENT)
Dept: INTERNAL MEDICINE CLINIC | Age: 51
End: 2021-06-24

## 2021-08-04 ENCOUNTER — TELEPHONE (OUTPATIENT)
Dept: INTERNAL MEDICINE CLINIC | Age: 51
End: 2021-08-04

## 2021-08-04 NOTE — TELEPHONE ENCOUNTER
Pt dropped off form from [employer] commission for RD to fill out and fax to Nai Reynoso at [phone]    Copy at  original in RD box.

## 2021-08-06 NOTE — TELEPHONE ENCOUNTER
Pt states it would be covering her total time you were seeing her. That she may have missed work or been late for work due to her being dragging or slow because of her dx.     She actually has lost her job so she is trying to get unemployment

## 2021-08-11 NOTE — TELEPHONE ENCOUNTER
Patient is calling again. Stating she has been trying to get this taken care of for a week. Please advise if form has been completed.

## 2021-08-12 NOTE — TELEPHONE ENCOUNTER
Patient stating this is for her dx of diabetes. She wants it to cover starting the onset of diabetes until now. Stating diabetes makes her very tired.

## 2021-08-12 NOTE — TELEPHONE ENCOUNTER
[id number] Phyllis Coffman I filled out the form    I don't think it will be helpful to her - AT ALL    DM is a chronic disease that millions of people have. People generally do not get disability from it.       I have never advised anyone to take a leave of absence because of it    I cannot put in a specific date that she was out on the form    I can't say that she is unable to perform any kind of work    This is a legal document and I have to be as accurate as I can be

## 2021-08-24 ENCOUNTER — DOCUMENTATION ONLY (OUTPATIENT)
Dept: PULMONOLOGY | Age: 51
End: 2021-08-24

## 2021-09-21 NOTE — TELEPHONE ENCOUNTER
08-Sep-2021 16:48 Cyndie Velarde MD  [facility] Just now (12:03 PM)      Shakes and heart racing is not a side effect of being off this blood pressure medicine for a few days.  Since her heart is racing and she is having shakes she  should be checked at the emergency room. Routing comment      Spoke with patient and advised her message. Pt stated she believes she worked herself up over her medication. She stated her BP has came down. She is going to  new BP med, go home and relax to see if that helps. I advised patient if symptoms worsen or don't get any better to emergency room. 13-Sep-2021 15-Sep-2021 12:54 07-Sep-2021 12:49 08-Sep-2021 16:41 20-Sep-2021 13:08 21-Sep-2021 13:34 05-Sep-2021 15:57 05-Sep-2021 17:17 20-Sep-2021 10:29

## 2021-10-11 ENCOUNTER — TELEPHONE (OUTPATIENT)
Dept: INTERNAL MEDICINE CLINIC | Age: 51
End: 2021-10-11

## 2021-10-11 NOTE — TELEPHONE ENCOUNTER
Follow pulse ox and go to er if persistently 91% or less    Is she willing to take the regeneron antibody if we can arrange for her to get it?

## 2021-10-11 NOTE — TELEPHONE ENCOUNTER
Pt dx on Saturday with covid. She has congestion, slight wheezing. stomach upset but that may be better today. Wants to know if there is anything else she should be doing?  She is using inhaler for the wheezing

## 2021-10-12 NOTE — TELEPHONE ENCOUNTER
Pt given info below. She is not interested in antibody at this time. Says she is just feeling like she has a cold, congestion. She will continue with her vitamins, broth, and wait it out. She will call if symptoms change.

## 2021-10-15 ENCOUNTER — HOSPITAL ENCOUNTER (EMERGENCY)
Age: 51
Discharge: HOME | End: 2021-10-15
Attending: EMERGENCY MEDICINE
Payer: COMMERCIAL

## 2021-10-15 ENCOUNTER — TELEPHONE (OUTPATIENT)
Dept: INTERNAL MEDICINE CLINIC | Age: 51
End: 2021-10-15

## 2021-10-15 VITALS
OXYGEN SATURATION: 98 % | TEMPERATURE: 98.9 F | SYSTOLIC BLOOD PRESSURE: 133 MMHG | DIASTOLIC BLOOD PRESSURE: 93 MMHG | BODY MASS INDEX: 38.32 KG/M2 | RESPIRATION RATE: 20 BRPM | HEIGHT: 65 IN | HEART RATE: 99 BPM | WEIGHT: 230 LBS

## 2021-10-15 DIAGNOSIS — U07.1 COVID-19: Primary | ICD-10-CM

## 2021-10-15 PROCEDURE — 99282 EMERGENCY DEPT VISIT SF MDM: CPT

## 2021-10-15 PROCEDURE — 74011000258 HC RX REV CODE- 258: Performed by: EMERGENCY MEDICINE

## 2021-10-15 PROCEDURE — 74011000636 HC RX REV CODE- 636: Performed by: EMERGENCY MEDICINE

## 2021-10-15 PROCEDURE — M0243 CASIRIVI AND IMDEVI INFUSION: HCPCS

## 2021-10-15 RX ADMIN — CASIRIVIMAB AND IMDEVIMAB 1200 MG: 600; 600 INJECTION, SOLUTION, CONCENTRATE INTRAVENOUS at 16:23

## 2021-10-15 NOTE — ED NOTES
Patient is covid +    Patient states her symptoms 10/4/2021    Patient states her symptoms are weakness, nausea and eyeball pain.

## 2021-10-15 NOTE — TELEPHONE ENCOUNTER
Hermilo Melgar, spouse called. Pt was diagnosed with Covid last Saturday and now wants to get antibody treatment. Pt has chills and is nauseous and starting to vomit.   Please advise him at [phone]

## 2021-10-15 NOTE — ED NOTES
Infusion has been completed    Patient tolerated infusion without difficulty.     Denies any complaints

## 2021-10-15 NOTE — ED PROVIDER NOTES
EMERGENCY DEPARTMENT HISTORY AND PHYSICAL EXAM    3:34 PM      Date: 10/15/2021  Patient Name: Jamaal Vasquez    History of Presenting Illness     Chief Complaint   Patient presents with    Positive For Covid-19       History Provided By: Patient    Additional History (Context): Jamaal Vasquez is a 48 y.o. female with hx of HTN, HLD, DM, asthma, and other noted PMH who presents to the ED for Regeneron infusion. Pt was sent to the ED by her PMD's office. Pt notes she tested positive for COVID on 10/9 at [facility]. Notes fatigue, cough, nausea, and decreased appetite. Denies fever/chills, chest pain, dyspnea, abdominal pain. Notes she is not vaccinated for COVID. PCP: Osorio Umaña MD    Current Outpatient Medications   Medication Sig Dispense Refill    pioglitazone (ACTOS) 15 mg tablet Take 2 tablets by mouth once daily 90 Tablet 3    candesartan (ATACAND) 16 mg tablet Take 1 tablet by mouth once daily 90 Tablet 3    chlorthalidone (HYGROTON) 25 mg tablet Take 1 tablet by mouth once daily 90 Tablet 3    atorvastatin (LIPITOR) 40 mg tablet Take 1 Tablet by mouth daily. 90 Tablet 3    budesonide-formoteroL (Symbicort) 160-4.5 mcg/actuation HFAA Take 2 Puffs by inhalation two (2) times a day. 1 Inhaler 11    albuterol (PROVENTIL HFA, VENTOLIN HFA, PROAIR HFA) 90 mcg/actuation inhaler Take 1-2 Puffs by inhalation every four (4) hours as needed for Wheezing.  1 Inhaler 11    Bydureon BCise 2 mg/0.85 mL atIn INJECT 2 MILLIGRAMS BENEATH THE SKIN EVERY 7 DAYS 4 mL 11    citalopram (CELEXA) 20 mg tablet Take 1 tablet by mouth once daily 90 Tab 3    amLODIPine (NORVASC) 5 mg tablet Take 1 tablet by mouth once daily 90 Tab 3    spironolactone (ALDACTONE) 25 mg tablet Take 1 tablet by mouth once daily 90 Tab 3    glimepiride (AMARYL) 4 mg tablet TAKE 1 TABLET BY MOUTH EVERY MORNING 90 Tab 3    metFORMIN ER (GLUCOPHAGE XR) 500 mg tablet TAKE 2 TABLETS BY MOUTH ONCE DAILY WITH SUPPER 180 Tab 3    KLOR-CON M20 20 mEq tablet TAKE 1 TABLET BY MOUTH ONCE DAILY 90 Tab 3    Blood-Glucose Meter monitoring kit Pt checks blood sugar once daily, Dx E11.9 1 Kit 0    Lancets misc Pt checks blood sugar once daily, Dx E11.9 100 Each 3    glucose blood VI test strips (BLOOD GLUCOSE TEST) strip Pt checks blood sugar once daily, Dx e11.9 100 Strip 3       Past History     Past Medical History:  Past Medical History:   Diagnosis Date    Acanthosis nigricans     Allergic rhinitis     Anemia, iron deficiency     from menorrhagia    Anxiety     Asthma     Chronic anemia     Dr Randal Gilford; thalassemia    Depression     Diabetes mellitus ([facility] [id number]    History of CVA (cerebrovascular accident)     no deficits    Hyperlipidemia     Hypertension     Hypovitaminosis D 10/11    Menorrhagia 2012    s/p endometrial ablation 3/16    Morbid obesity ([facility]     dec dharmesh supp, med sup wt loss, bariatrics; IF 3/18 start weight 226 lbs but not doing    JOLIE (obstructive sleep apnea)     Dr. Jesús Darnell cpap    Spinal stenosis of lumbar region with neurogenic claudication        Past Surgical History:  Past Surgical History:   Procedure Laterality Date    HX  SECTION      x2    HX COLONOSCOPY      Dr Randal Gilford 6/10 negative    HX GI  6/10    neg EGD Dr Guillaume Suarez HX GYN      cyst removal    HX GYN      LEEP    HX HYSTEROSCOPY WITH ENDOMETRIAL ABLATION  3/16    Dr Gaurav Shaver       Family History:  Family History   Problem Relation Age of Onset    Diabetes Mother        Social History:  Social History     Tobacco Use    Smoking status: Never Smoker    Smokeless tobacco: Never Used   Substance Use Topics    Alcohol use: Yes     Comment: social drinker    Drug use: No       Allergies:   Allergies   Allergen Reactions    Penicillins Unknown (comments)     Tongue swelling; difficulty swallowing; thrush    Compazine [Prochlorperazine] Unknown (comments)     Paranoia    Invokana [Canagliflozin] Other (comments)     Yeast infections    [place] Swelling     Swelling and itching of mouth, lips, and tongue         Review of Systems       Review of Systems   Constitutional: Positive for appetite change and fatigue. Negative for chills and fever. Respiratory: Positive for cough. Negative for shortness of breath. Cardiovascular: Negative for chest pain. Gastrointestinal: Positive for nausea. Negative for abdominal pain and vomiting. Skin: Negative for rash. Neurological: Negative for weakness. All other systems reviewed and are negative. Physical Exam     Visit Vitals  BP (!) 133/93 (BP 1 Location: Left upper arm, BP Patient Position: At rest)   Pulse 99   Temp 98.9 °F (37.2 °C)   Resp 20   Ht 5' 5\" (1.651 m)   Wt 104.3 kg (230 lb)   SpO2 98%   BMI 38.27 kg/m²         Physical Exam  Vitals and nursing note reviewed. Constitutional:       General: She is not in acute distress. Appearance: She is well-developed. She is not ill-appearing, toxic-appearing or diaphoretic. HENT:      Head: Normocephalic and atraumatic. Nose: Nose normal.      Mouth/Throat:      Mouth: Mucous membranes are moist.   Cardiovascular:      Rate and Rhythm: Normal rate and regular rhythm. Heart sounds: Normal heart sounds. No murmur heard. No friction rub. No gallop. Pulmonary:      Effort: Pulmonary effort is normal. No respiratory distress. Breath sounds: Normal breath sounds. No wheezing or rales. Abdominal:      General: Abdomen is flat. There is no distension. Palpations: Abdomen is soft. Tenderness: There is no abdominal tenderness. There is no guarding or rebound. Musculoskeletal:         General: Normal range of motion. Cervical back: Normal range of motion and neck supple. Skin:     General: Skin is warm. Findings: No rash. Neurological:      Mental Status: She is alert.            Diagnostic Study Results     Labs -  No results found for this or any previous visit (from the past 12 hour(s)). Radiologic Studies -   No orders to display         Medical Decision Making   I am the first provider for this patient. I reviewed the vital signs, available nursing notes, past medical history, past surgical history, family history and social history. Vital Signs-Reviewed the patient's vital signs. Records Reviewed: Nursing Notes and Old Medical Records (Time of Review: 3:34 PM)    ED Course: Progress Notes, Reevaluation, and Consults:  5:30 PM:  Infusion complete. No reaction, pt resting comfortably, no distress. Discussed need for close outpatient follow-up this week for reassessment. Discussed strict return precautions, including chest pain, shortness of breath, or any other medical concerns. Pt in agreement with plan. Provider Notes (Medical Decision Making): 70-year-old female Geraldine Novak  who presents to the ED for Regeneron infusion. Afebrile, nontoxic-appearing, looks well. No evidence tachycardia, tachypnea, hypoxia. Infusion completed without reaction. Stable for discharge with close outpatient follow-up further assessment. Strict return precautions provided. Diagnosis     Clinical Impression:   1. COVID-19        Disposition: home     Follow-up Information     Follow up With Specialties Details Why [facility] EMERGENCY DEPT Emergency Medicine  If symptoms worsen [address] Lashell Kingsley MD Internal Medicine Schedule an appointment as soon as possible for a visit   [address]  [phone]             Patient's Medications   Start Taking    No medications on file   Continue Taking    ALBUTEROL (PROVENTIL HFA, VENTOLIN HFA, PROAIR HFA) 90 MCG/ACTUATION INHALER    Take 1-2 Puffs by inhalation every four (4) hours as needed for Wheezing.     AMLODIPINE (NORVASC) 5 MG TABLET    Take 1 tablet by mouth once daily    ATORVASTATIN (LIPITOR) 40 MG TABLET    Take 1 Tablet by mouth daily. BLOOD-GLUCOSE METER MONITORING KIT    Pt checks blood sugar once daily, Dx E11.9    BUDESONIDE-FORMOTEROL (SYMBICORT) 160-4.5 MCG/ACTUATION HFAA    Take 2 Puffs by inhalation two (2) times a day. BYDUREON BCISE 2 MG/0.85 ML ATIN    INJECT 2 MILLIGRAMS BENEATH THE SKIN EVERY 7 DAYS    CANDESARTAN (ATACAND) 16 MG TABLET    Take 1 tablet by mouth once daily    CHLORTHALIDONE (HYGROTON) 25 MG TABLET    Take 1 tablet by mouth once daily    CITALOPRAM (CELEXA) 20 MG TABLET    Take 1 tablet by mouth once daily    GLIMEPIRIDE (AMARYL) 4 MG TABLET    TAKE 1 TABLET BY MOUTH EVERY MORNING    GLUCOSE BLOOD VI TEST STRIPS (BLOOD GLUCOSE TEST) STRIP    Pt checks blood sugar once daily, Dx e11.9    KLOR-CON M20 20 MEQ TABLET    TAKE 1 TABLET BY MOUTH ONCE DAILY    LANCETS MISC    Pt checks blood sugar once daily, Dx E11.9    METFORMIN ER (GLUCOPHAGE XR) 500 MG TABLET    TAKE 2 TABLETS BY MOUTH ONCE DAILY WITH SUPPER    PIOGLITAZONE (ACTOS) 15 MG TABLET    Take 2 tablets by mouth once daily    SPIRONOLACTONE (ALDACTONE) 25 MG TABLET    Take 1 tablet by mouth once daily   These Medications have changed    No medications on file   Stop Taking    No medications on file       Dictation disclaimer:  Please note that this dictation was completed with [employer], the [employer] voice recognition software. Quite often unanticipated grammatical, syntax, homophones, and other interpretive errors are inadvertently transcribed by the computer software. Please disregard these errors. Please excuse any errors that have escaped final proofreading.

## 2021-10-15 NOTE — TELEPHONE ENCOUNTER
Pls call  Spoke with Dr Damion Yee  They have the antibody  She needs to go to ER now/soon and they are expecting her  They will do their own evaluation and see if she qualifies by criteria and then administer - will take about 4 hrs total in and out

## 2021-10-18 ENCOUNTER — PATIENT OUTREACH (OUTPATIENT)
Dept: CASE MANAGEMENT | Age: 51
End: 2021-10-18

## 2021-10-18 NOTE — PROGRESS NOTES
Date/Time:  10/18/2021 11:42 AM   Call within 2 business days of discharge: Yes   Attempted to reach patient by telephone. Unable to leave HIPPA compliant message requesting a return call. Will attempt to reach patient again.

## 2021-10-19 ENCOUNTER — PATIENT OUTREACH (OUTPATIENT)
Dept: CASE MANAGEMENT | Age: 51
End: 2021-10-19

## 2021-10-19 NOTE — PROGRESS NOTES
Date/Time:  10/19/2021 10:25 AM   Call within 2 business days of discharge: Yes   2nd attempt to reach patient by telephone. Unable to leave HIPPA compliant message requesting a return call. This episode is resolved.

## 2021-11-02 DIAGNOSIS — E87.6 HYPOKALEMIA: ICD-10-CM

## 2021-11-03 RX ORDER — METFORMIN HYDROCHLORIDE 500 MG/1
TABLET, EXTENDED RELEASE ORAL
Qty: 180 TABLET | Refills: 0 | Status: SHIPPED | OUTPATIENT
Start: 2021-11-03 | End: 2022-03-04

## 2021-11-03 RX ORDER — SPIRONOLACTONE 25 MG/1
TABLET ORAL
Qty: 90 TABLET | Refills: 0 | Status: SHIPPED | OUTPATIENT
Start: 2021-11-03 | End: 2022-05-06

## 2021-11-03 RX ORDER — GLIMEPIRIDE 4 MG/1
TABLET ORAL
Qty: 90 TABLET | Refills: 0 | Status: SHIPPED | OUTPATIENT
Start: 2021-11-03

## 2021-11-04 ENCOUNTER — TELEPHONE (OUTPATIENT)
Dept: INTERNAL MEDICINE CLINIC | Age: 51
End: 2021-11-04

## 2021-11-04 DIAGNOSIS — B37.31 YEAST VAGINITIS: ICD-10-CM

## 2021-11-04 DIAGNOSIS — R05.9 COUGH: Primary | ICD-10-CM

## 2021-11-04 RX ORDER — HYDROCODONE POLISTIREX AND CHLORPHENIRAMINE POLISTIREX 10; 8 MG/5ML; MG/5ML
5 SUSPENSION, EXTENDED RELEASE ORAL
Qty: 70 ML | Refills: 0 | Status: SHIPPED | OUTPATIENT
Start: 2021-11-04 | End: 2021-11-11

## 2021-11-04 NOTE — TELEPHONE ENCOUNTER
Patient called back, gave her message below. Pt verbalized understanding, no further questions at this time.

## 2021-11-04 NOTE — TELEPHONE ENCOUNTER
Called patient on [phone] to inform her that prescription for tussinex was sent to pharmacy, unable to leave  (not set up)

## 2021-11-04 NOTE — TELEPHONE ENCOUNTER
Patient called and said she was diagnosed with Covid on 10/9/21. She says that she has a lingering cough from it that won't go away. It gets worse at night and its causing headaches and her ribs to ache. She has tried different things such as Diabetic Tussin, ricola, and tea w/ honey but none have helped. She is calling to see if there is anything else that Dr. Ryder Cee suggests she try that can help get alleviate the coughing.      Please advise, thank you

## 2021-11-08 RX ORDER — FLUCONAZOLE 150 MG/1
TABLET ORAL
Qty: 1 TABLET | Refills: 0 | Status: SHIPPED | OUTPATIENT
Start: 2021-11-08 | End: 2021-12-14 | Stop reason: ALTCHOICE

## 2021-12-07 ENCOUNTER — HOSPITAL ENCOUNTER (OUTPATIENT)
Dept: LAB | Age: 51
Discharge: HOME | End: 2021-12-07

## 2021-12-07 LAB — SENTARA SPECIMEN COL,SENBCF: NORMAL

## 2021-12-07 PROCEDURE — 99001 SPECIMEN HANDLING PT-LAB: CPT

## 2021-12-08 LAB
A-G RATIO,AGRAT: 1.4 RATIO (ref 1.1–2.6)
ALBUMIN SERPL-MCNC: 4.5 G/DL (ref 3.5–5)
ALP SERPL-CCNC: 81 U/L (ref 25–115)
ALT SERPL-CCNC: 12 U/L (ref 5–40)
ANION GAP SERPL CALC-SCNC: 15 MMOL/L (ref 3–15)
AST SERPL W P-5'-P-CCNC: 14 U/L (ref 10–37)
AVG GLU, 10930: 140 MG/DL (ref 91–123)
BILIRUB SERPL-MCNC: 0.4 MG/DL (ref 0.2–1.2)
BUN SERPL-MCNC: 13 MG/DL (ref 6–22)
CALCIUM SERPL-MCNC: 9.9 MG/DL (ref 8.4–10.5)
CHLORIDE SERPL-SCNC: 100 MMOL/L (ref 98–110)
CHOLEST SERPL-MCNC: 175 MG/DL (ref 110–200)
CO2 SERPL-SCNC: 24 MMOL/L (ref 20–32)
CREAT SERPL-MCNC: 0.8 MG/DL (ref 0.5–1.2)
CREATININE, URINE: 214 MG/DL
GFRAA, 66117: >60
GFRNA, 66118: >60
GLOBULIN,GLOB: 3.3 G/DL (ref 2–4)
GLUCOSE SERPL-MCNC: 98 MG/DL (ref 70–99)
HBA1C MFR BLD HPLC: 6.5 % (ref 4.8–5.6)
HDLC SERPL-MCNC: 4.4 MG/DL (ref 0–5)
HDLC SERPL-MCNC: 40 MG/DL
LDL/HDL RATIO,LDHD: 2.7
LDLC SERPL CALC-MCNC: 106 MG/DL (ref 50–99)
MICROALB/CREAT RATIO, 140286: 11.7 (ref 0–30)
MICROALBUMIN,URINE RANDOM 140054: 25 MG/L (ref 0.1–17)
NON-HDL CHOLESTEROL, 011976: 136 MG/DL
POTASSIUM SERPL-SCNC: 3.7 MMOL/L (ref 3.5–5.5)
PROT SERPL-MCNC: 7.8 G/DL (ref 6.4–8.3)
SODIUM SERPL-SCNC: 139 MMOL/L (ref 133–145)
TRIGL SERPL-MCNC: 147 MG/DL (ref 40–149)
VLDLC SERPL CALC-MCNC: 29 MG/DL (ref 8–30)

## 2021-12-10 NOTE — PROGRESS NOTES
48 y.o. BLACK/ female who presents for evaluation. Both she and her  had a rough October. They both came down with Covid, ended up getting Regeneron infusion at the ER. Still she has a lot of complaints. Still having occasional coughing, she has inhalers at home which help. She feels that this triggered a little bit of a depression, also may be her memory is not doing so well. She actually lost her job. She has a lot of tingling in her hands and knees, really interested in seeing a neurologist at this point. Interestingly, she still is not convinced to take the Covid vaccine    She has intermittent issues with the asthma but never went to see Dr. Larry Mauricio whom we referred her to    No cardiovascular complaints. Denies polyuria, polydipsia, nocturia, vision change. She is now doing intermittent fasting, first meal usually around 2:00, last intake around 9-10. Not much weight loss but her sugar control has improved markedly. She actually cut the Amaryl in half, but still taking in the morning so we switched her to afternoon dosing.     She was referred for colonoscopy but never went, no GI or  complaints    Past Medical History:   Diagnosis Date    Acanthosis nigricans 7/13    Allergic rhinitis     Anemia, iron deficiency     from menorrhagia    Anxiety     Asthma     Chronic anemia     Dr Jr Garcia; thalassemia    COVID-19 virus infection 10/2021    regeneron infusion    Depression     Diabetes mellitus ([address] [id number]    History of CVA (cerebrovascular accident) 1996    no deficits    Hyperlipidemia     Hypertension     Lumbar spinal stenosis     neurogenic claudication    Menorrhagia 03/2012    s/p endometrial ablation 3/16    Morbid obesity ([address]     dec dharmesh supp, med sup wt loss, bariatrics; IF 3/18 start weight 226 lbs but not doing    JOLIE (obstructive sleep apnea)     Dr. Jose Manuel Angelo cpap    Vitamin D deficiency 10/2011     Past Surgical History:   Procedure Laterality Date    HX  SECTION      x2    HX COLONOSCOPY      Dr Aponte Arn 6/10 negative    HX ENDOSCOPY  6/10    neg EGD Dr Duy Giraldo HX GYN      cyst removal    HX HYSTEROSCOPY WITH ENDOMETRIAL ABLATION  3/16    Dr Eren Jeong   [place] LEEP PROCEDURE       Social History     Socioeconomic History    Marital status:      Spouse name: Not on file    Number of children: 2    Years of education: Not on file    Highest education level: Not on file   Occupational History    Occupation: works at [employer] Use    Smoking status: Never Smoker    Smokeless tobacco: Never Used   Substance and Sexual Activity    Alcohol use: Yes     Comment: social drinker    Drug use: No    Sexual activity: Not on file   Other Topics Concern    Not on file   Social History Narrative    Not on file     Social Determinants of Health     Financial Resource Strain:     Difficulty of Paying Living Expenses: Not on file   Food Insecurity:     Worried About [address] in the Last Year: Not on file    [address] in the Last Year: Not on file   Transportation Needs:     Lack of Transportation (Medical): Not on file    Lack of Transportation (Non-Medical):  Not on file   Physical Activity:     Days of Exercise per Week: Not on file    Minutes of Exercise per Session: Not on file   Stress:     Feeling of Stress : Not on file   Social Connections:     Frequency of Communication with Friends and Family: Not on file    Frequency of Social Gatherings with Friends and Family: Not on file    Attends Restorationist Services: Not on file    Active Member of Clubs or Organizations: Not on file    Attends Club or Organization Meetings: Not on file    Marital Status: Not on file   Intimate Partner Violence:     Fear of Current or Ex-Partner: Not on file    Emotionally Abused: Not on file    Physically Abused: Not on file    Sexually Abused: Not on file   Housing Stability:     Unable to Pay for Housing in the Last Year: Not on file    Number of Places Lived in the Last Year: Not on file    Unstable Housing in the Last Year: Not on file     Current Outpatient Medications   Medication Sig    citalopram (CELEXA) 40 mg tablet Take 1 Tablet by mouth daily.  metFORMIN ER (GLUCOPHAGE XR) 500 mg tablet TAKE 2 TABLETS BY MOUTH ONCE DAILY WITH SUPPER    spironolactone (ALDACTONE) 25 mg tablet Take 1 tablet by mouth once daily    glimepiride (AMARYL) 4 mg tablet TAKE 1 TABLET BY MOUTH ONCE DAILY IN THE MORNING (Patient taking differently: Taking 1/2 tab)    pioglitazone (ACTOS) 15 mg tablet Take 2 tablets by mouth once daily    candesartan (ATACAND) 16 mg tablet Take 1 tablet by mouth once daily    chlorthalidone (HYGROTON) 25 mg tablet Take 1 tablet by mouth once daily    atorvastatin (LIPITOR) 40 mg tablet Take 1 Tablet by mouth daily.  budesonide-formoteroL (Symbicort) 160-4.5 mcg/actuation HFAA Take 2 Puffs by inhalation two (2) times a day.  albuterol (PROVENTIL HFA, VENTOLIN HFA, PROAIR HFA) 90 mcg/actuation inhaler Take 1-2 Puffs by inhalation every four (4) hours as needed for Wheezing.  Bydureon BCise 2 mg/0.85 mL atIn INJECT 2 MILLIGRAMS BENEATH THE SKIN EVERY 7 DAYS    amLODIPine (NORVASC) 5 mg tablet Take 1 tablet by mouth once daily    Blood-Glucose Meter monitoring kit Pt checks blood sugar once daily, Dx E11.9    Lancets misc Pt checks blood sugar once daily, Dx E11.9    glucose blood VI test strips (BLOOD GLUCOSE TEST) strip Pt checks blood sugar once daily, Dx e11.9    KLOR-CON M20 20 mEq tablet TAKE 1 TABLET BY MOUTH ONCE DAILY (Patient not taking: Reported on 12/14/2021)     No current facility-administered medications for this visit.      Allergies   Allergen Reactions    Penicillins Unknown (comments)     Tongue swelling; difficulty swallowing; thrush    Compazine [Prochlorperazine] Unknown (comments)     Paranoia    Invokana [Canagliflozin] Other (comments)     Yeast infections    [place] Swelling     Swelling and itching of mouth, lips, and tongue     REVIEW OF SYSTEMS: Dr Ulysses Rued 2017, mammo 2/13, sees [facility], colo 2010  Ophtho - no vision change or eye pain  Oral - no mouth pain, tongue or tooth problems  Ears - no hearing loss, ear pain, fullness, no swallowing problems  Cardiac - no CP, PND, orthopnea, edema, palpitations or syncope  Chest - no breast masses  Resp - no wheezing, chronic coughing, dyspnea  GI - no heartburn, nausea, vomiting, change in bowel habits, bleeding, hemorrhoids  Urinary - no dysuria, hematuria, flank pain, urgency, frequency    Visit Vitals  /79   Pulse 79   Temp 97.3 °F (36.3 °C) (Temporal)   Resp 16   Ht 5' 5\" (1.651 m)   Wt 232 lb (105.2 kg)   SpO2 97%   BMI 38.61 kg/m²   A&O x3  Affect is appropriate. Mood stable  No apparent distress  Anicteric, no JVD, adenopathy or thyromegaly. No carotid bruits or radiated murmur  Lungs clear to auscultation, no wheezes or rales  Heart showed regular rate and rhythm. No murmur, rubs, gallops  Abdomen soft nontender, no hepatosplenomegaly or masses. Extremities without edema.   Pulses 1-2+ symmetrically    LABS  From 10/11 showed                   hba1c 11.1, ldl-p 2182, chol 166, tg 139, hdl 39, ldl-c 99,                  vit d 16.5  From 12/11 showed gluc 111, cr 0.68,              alt10,                     ldl-p 1980, chol 178, tg 104, hdl 40, ldl-c 117  From 3/12 showed                   hba1c 6.4,                   wbc 4.4, hb 11.0, plt 306,             vit d 41.6, %sat 10, ferritin 20, spep neg, fol 12.5, b12 665  From 11/12 showed gluc 196, cr 0.50,              alt 27, hba1c 9.1,  ldl-p 1745, chol 154, tg 146, hdl 41, ldl-c 84  From 7/13 showed   gluc 157, cr 0.50, gfr>60, alt 18, hba1c 8.8,   ldl-p 1017, chol 119, tg 102, hdl 39, ldl-c 60  From 8/14 showed   gluc 137, cr 0.50, gfr>60, alt 15, hba1c 7.3,       chol 118, tg 92,   hdl 34, ldl-c 66,   wbc 3.1, hb 8.9,   plt 236, umar 17.6  From 12/15 showed gluc 124, cr 0.60, gfr>60, alt 23, hba1c 6.7,       chol 120, tg 161, hdl 34, ldl-c 54,   wbc 4.5, hb 10.2, plt 274,             vit d 13.9, tsh 2.12  From 5/16 showed   gluc 121, cr 0.70, gfr>60, alt 27  From 7/16 showed        hba1c 7.4,       chol 134, tg 127, hdl 40, ldl-c 69  From 11/17 showed gluc 260, cr 1.06, gfr>60, alt 32, hba1c 9.1,       chol 143, tg 180, hdl 33, ldl-c 74,   wbc 3.5, hb 10.4, plt 245, umar 72.5, vit d 12.3  From 3/18 showed   gluc 182, cr 0.60, gfr>60,    hba1c 8.9,       chol 163, tg 105, hdl 41, ldl-c 101, wbc 3.6, hb 10.5, plt 276, caamcho  70.2, vit d 19.2  From 10/18 showed gluc 206, cr 1.25, gfr 56,                               ck/trop neg, ua neg  From 3/19 showed   gluc 85,   cr 0.60, gfr>60,    hba1c 6.8,       chol 149, tg 100, hdl 45, ldl-c 84,             umar 60.8, vit d 37.0  From 9/19 showed   gluc 85,   cr 0.60, gfr>60,    hba1c 8.0,      chol 153, tg 198, hdl 41, ldl-c 85,   wbc 3.2, hb 10.6, plt 277, umar 31.6      tsh 1.28, ft4 1.10, b12>2k, fol>20  From 1/30 showed        hba1c 8.7  From 6/20 showed   gluc 127, cr 0.60, gfr>60, alt 21, hba1c 7.6,      chol 145, tg 143, hdl 37, ldl-c 80,   wbc 4.0, hb 10.4, plt 223, umar 18.8  From 2/21 showed   gluc 152, cr 0.70, gfr>60,    hba1c 6.6, umar neg,    chol 155, tg 141, hdl 37, ldl-c 90  From 6/21 showed   gluc 151, cr 0.70, gfr>60,    hba1c 7.3,       chol 164, tg 132, hdl 39, ldl-c 99,  wbc 3.1, hb 10.1, plt 271  From 12/21 showed gluc 98,   cr 0.80, gfr>60, alt 12, hba1c 6.5, umar 11.7,   chol 175, tg 147, hdl 40, ldl-c 106    We reviewed the patient's labs from the last several visits to point out trends in the numbers          Patient Active Problem List   Diagnosis Code    Asthma J45.909    JOLIE and PLMD Dr. Brian Eagle 2005 G47.33    Hypovitaminosis D E55.9    Dyslipidemia E78.5    Primary hypertension I10    Uncontrolled type 2 diabetes mellitus with microalbuminuria E11.29, E11.65, R80.9    Severe obesity (BMI 35.0-39. 9) with comorbidity (HCC) E66.01    Mild episode of recurrent major depressive disorder ([facility] F33.0    Anxiety F41.9    Chronic anemia D64.9     Assessment and plan:  1. Diabetes. On actos, metformin, bydureon. Amaryl 1 mg to be taken with her first meal  2. Hyperlipidemia. Controlled on Lipitor  3. Hypovitaminosis D. Supplementation  4. Sleep apnea. F/u Dr. Alvina Cooney  5. Morbid obesity. Lifestyle and dietary measures. 6. Hypertension. Controlled on current regimen. 7. Asthma. She wants to hold off on pulmonary consult; continue current  8. Anemia. F/U Dr Jordan Still  9. [place] to be scheduled Dr Antwan Ross  10. Depression and anxiety. Increase Celexa to 40. She is not interested in psychotherapy, call with an update  11. Memory issues, paresthesias. Second opinion neurology        RTC 10/21    Above conditions discussed at length and patient vocalized understanding. All questions answered to patient satisfaction    Total time 50 minutes which 40 minutes was on counseling      ICD-10-CM ICD-9-CM    1. Uncontrolled type 2 diabetes mellitus with microalbuminuria  T36.21 045.95 METABOLIC PANEL, BASIC    T66.47 791.0 MICROALBUMIN, UR, RAND W/ MICROALB/CREAT RATIO    R80.9  HEMOGLOBIN A1C WITH EAG   2. Primary hypertension  I10 401.9    3. Mild intermittent asthma without complication  I31.72 228.08    4. Dyslipidemia  E78.5 272.4 CBC W/O DIFF      LIPID PANEL   5. Anxiety  F41.9 300.00 citalopram (CELEXA) 40 mg tablet   6. Mild episode of recurrent major depressive disorder (HCC)  F33.0 296.31 citalopram (CELEXA) 40 mg tablet   7.  COVID-19 virus infection  U07.1 079.89 REFERRAL TO NEUROLOGY   8. Memory problem  R41.3 780.93 REFERRAL TO NEUROLOGY   9. Paresthesia  R20.2 782.0 REFERRAL TO NEUROLOGY   10. Screen for colon cancer  Z12.11 V76.51 REFERRAL TO GASTROENTEROLOGY

## 2021-12-12 DIAGNOSIS — E78.5 DYSLIPIDEMIA: ICD-10-CM

## 2021-12-14 ENCOUNTER — OFFICE VISIT (OUTPATIENT)
Dept: INTERNAL MEDICINE CLINIC | Age: 51
End: 2021-12-14
Payer: COMMERCIAL

## 2021-12-14 VITALS
HEART RATE: 79 BPM | OXYGEN SATURATION: 97 % | DIASTOLIC BLOOD PRESSURE: 79 MMHG | SYSTOLIC BLOOD PRESSURE: 127 MMHG | HEIGHT: 65 IN | WEIGHT: 232 LBS | BODY MASS INDEX: 38.65 KG/M2 | RESPIRATION RATE: 16 BRPM | TEMPERATURE: 97.3 F

## 2021-12-14 DIAGNOSIS — Z12.11 SCREEN FOR COLON CANCER: ICD-10-CM

## 2021-12-14 DIAGNOSIS — E78.5 DYSLIPIDEMIA: ICD-10-CM

## 2021-12-14 DIAGNOSIS — R20.2 PARESTHESIA: ICD-10-CM

## 2021-12-14 DIAGNOSIS — F41.9 ANXIETY: ICD-10-CM

## 2021-12-14 DIAGNOSIS — R41.3 MEMORY PROBLEM: ICD-10-CM

## 2021-12-14 DIAGNOSIS — I10 PRIMARY HYPERTENSION: ICD-10-CM

## 2021-12-14 DIAGNOSIS — F33.0 MILD EPISODE OF RECURRENT MAJOR DEPRESSIVE DISORDER (HCC): ICD-10-CM

## 2021-12-14 DIAGNOSIS — U07.1 COVID-19 VIRUS INFECTION: ICD-10-CM

## 2021-12-14 DIAGNOSIS — J45.20 MILD INTERMITTENT ASTHMA WITHOUT COMPLICATION: ICD-10-CM

## 2021-12-14 PROCEDURE — 99215 OFFICE O/P EST HI 40 MIN: CPT | Performed by: INTERNAL MEDICINE

## 2021-12-14 RX ORDER — CITALOPRAM 40 MG/1
40 TABLET, FILM COATED ORAL DAILY
Qty: 90 TABLET | Refills: 3 | Status: SHIPPED | OUTPATIENT
Start: 2021-12-14

## 2021-12-14 NOTE — PROGRESS NOTES
Broashwin Phuong presents today for   Chief Complaint   Patient presents with   [facility] Follow-up    Labs     12-7-21    Diabetes          1. \"Have you been to the ER, urgent care clinic since your last visit? Hospitalized since your last visit? \" {no    2. \"Have you seen or consulted any other health care providers outside of the [address] [place] [address] since your last visit? \" yes     3. For patients aged 39-70: Has the patient had a colonoscopy?due    If the patient is female:    4. For patients aged 41-77: Has the patient had a mammogram within the past 2 years? yes    5.  For patients aged 21-65: Has the patient had a pap smear? yes

## 2022-01-27 NOTE — TELEPHONE ENCOUNTER
Pt called and stated tht she has some cracks on both sides of her mouth, she said it's not raw but it stings a little bit, she is asking if you can send her some type of ointment to put on it, she has a hard time getting time off at work, pls adv, RD telmisarten

## 2022-03-19 PROBLEM — D64.9 CHRONIC ANEMIA: Status: ACTIVE | Noted: 2018-10-17

## 2022-03-19 PROBLEM — F33.0 MILD EPISODE OF RECURRENT MAJOR DEPRESSIVE DISORDER (HCC): Status: ACTIVE | Noted: 2018-03-23

## 2022-03-19 PROBLEM — E66.01 SEVERE OBESITY (BMI 35.0-39.9) WITH COMORBIDITY (HCC): Status: ACTIVE | Noted: 2018-03-23

## 2022-03-20 PROBLEM — F41.9 ANXIETY: Status: ACTIVE | Noted: 2018-03-23

## 2022-04-10 RX ORDER — BUDESONIDE AND FORMOTEROL FUMARATE DIHYDRATE 160; 4.5 UG/1; UG/1
2 AEROSOL RESPIRATORY (INHALATION) 2 TIMES DAILY
Qty: 10.2 G | Refills: 11 | Status: SHIPPED | OUTPATIENT
Start: 2022-04-10 | End: 2022-08-24

## 2022-05-20 DIAGNOSIS — R05.9 COUGH: ICD-10-CM

## 2022-05-20 RX ORDER — BENZONATATE 200 MG/1
CAPSULE ORAL
Qty: 21 CAPSULE | Refills: 0 | Status: SHIPPED | OUTPATIENT
Start: 2022-05-20 | End: 2022-06-14 | Stop reason: ALTCHOICE

## 2022-06-06 ENCOUNTER — TELEPHONE (OUTPATIENT)
Dept: INTERNAL MEDICINE CLINIC | Age: 52
End: 2022-06-06

## 2022-06-06 DIAGNOSIS — E53.8 B12 DEFICIENCY: Primary | ICD-10-CM

## 2022-06-06 NOTE — TELEPHONE ENCOUNTER
Patient is requesting the her Vitamin B12 level be checked. She will be going to [facility] for labs this week.

## 2022-06-08 ENCOUNTER — HOSPITAL ENCOUNTER (OUTPATIENT)
Dept: LAB | Age: 52
Discharge: HOME | End: 2022-06-08

## 2022-06-08 PROCEDURE — 99001 SPECIMEN HANDLING PT-LAB: CPT

## 2022-06-08 NOTE — PROGRESS NOTES
46 y.o. BLACK/ female who presents for evaluation. After the covid infection last year, she had some deaths in the family and went into a depression. She 'stopped caring' about her medical issues for a while then got some psychotherapy and is 'now back' to wanting to take care of herself again. No VI/hallucinations, remains on celexa    No cardiovascular complaints. Denied any resp issues currently. She is trying to get the exercise inc with walking and biking with her family    Denies polyuria, polydipsia, nocturia, vision change. Not checking her sugars routinely    No GI or  complaints.   She plans to schedule with gyn, schedule her colo and also the mammo    Continues to decline all immunizations    She continues to have memory issues post-covid, never scheduled with neuro    Past Medical History:   Diagnosis Date    Acanthosis nigricans     Allergic rhinitis     Anemia, iron deficiency     from menorrhagia    Anxiety     Asthma     Chronic anemia     Dr Sherine Stokes; thalassemia    COVID-19 vaccination declined     COVID-19 virus infection 10/2021    regeneron infusion    Depression     DM (diabetes mellitus) ([facility] [address] [id number]    History of CVA (cerebrovascular accident)     no deficits    Hyperlipidemia     Hypertension     Immunization declined     repeatedly    Lumbar spinal stenosis     neurogenic claudication    Menorrhagia 2012    s/p endometrial ablation 3/16    Morbid obesity ([facility] [address]     dec dharmesh supp, med sup wt loss, bariatrics; IF 3/18 start weight 226 lbs but not doing    JOLIE (obstructive sleep apnea)     Dr. Robertson Dills cpap    Vitamin D deficiency 10/2011     Past Surgical History:   Procedure Laterality Date    HX  SECTION      x2    HX COLONOSCOPY      Dr Sherine Stokes 6/10 negative    HX ENDOSCOPY  6/10    neg EGD Dr Sherine Stokes    HX GYN      cyst removal    HX HYSTEROSCOPY WITH ENDOMETRIAL ABLATION  3/16    Dr Frank Hardwick    HX LEEP PROCEDURE Social History     Socioeconomic History    Marital status:      Spouse name: Not on file    Number of children: 2    Years of education: Not on file    Highest education level: Not on file   Occupational History    Occupation: works at [employer] Use    Smoking status: Never Smoker    Smokeless tobacco: Never Used   Substance and Sexual Activity    Alcohol use: Yes     Comment: social drinker    Drug use: No    Sexual activity: Not on file   Other Topics Concern    Not on file   Social History Narrative    Not on file     Social Determinants of Health     Financial Resource Strain:     Difficulty of Paying Living Expenses: Not on file   Food Insecurity:     Worried About [address] [employer] in the Last Year: Not on file    Falguni of Food in the Last Year: Not on file   Transportation Needs:     Lack of Transportation (Medical): Not on file    Lack of Transportation (Non-Medical): Not on file   Physical Activity:     Days of Exercise per Week: Not on file    Minutes of Exercise per Session: Not on file   Stress:     Feeling of Stress : Not on file   Social Connections:     Frequency of Communication with Friends and Family: Not on file    Frequency of Social Gatherings with Friends and Family: Not on file    Attends Sikh Services: Not on file    Active Member of [address] [place] [address] [employer] or Organizations: Not on file    Attends Club or Organization Meetings: Not on file    Marital Status: Not on file   Intimate Partner Violence:     Fear of Current or Ex-Partner: Not on file    Emotionally Abused: Not on file    Physically Abused: Not on file    Sexually Abused: Not on file   Housing Stability:     Unable to Pay for Housing in the Last Year: Not on file    Number of [place] in the Last Year: Not on file    Unstable Housing in the Last Year: Not on file     Current Outpatient Medications   Medication Sig    atorvastatin (LIPITOR) 80 mg tablet Take 1 Tablet by mouth daily.     spironolactone (ALDACTONE) 25 mg tablet Take 1 tablet by mouth once daily    Symbicort 160-4.5 mcg/actuation HFAA Take 2 Puffs by inhalation two (2) times a day.  Bydureon BCise 2 mg/0.85 mL atIn INJECT 2 MILLILITERS BENEATH THE SKIN EVERY 7 DAYS    ergocalciferol (ERGOCALCIFEROL) 1,250 mcg (50,000 unit) capsule Take 1 capsule by mouth once a week    amLODIPine (NORVASC) 5 mg tablet Take 1 tablet by mouth once daily    metFORMIN ER (GLUCOPHAGE XR) 500 mg tablet TAKE 2 TABLETS BY MOUTH ONCE DAILY WITH SUPPER    potassium chloride (K-DUR, KLOR-CON M20) 20 mEq tablet Take 1 tablet by mouth once daily    citalopram (CELEXA) 40 mg tablet Take 1 Tablet by mouth daily.  glimepiride (AMARYL) 4 mg tablet TAKE 1 TABLET BY MOUTH ONCE DAILY IN THE MORNING (Patient taking differently: Taking 1/2 tab)    pioglitazone (ACTOS) 15 mg tablet Take 2 tablets by mouth once daily    candesartan (ATACAND) 16 mg tablet Take 1 tablet by mouth once daily    chlorthalidone (HYGROTON) 25 mg tablet Take 1 tablet by mouth once daily    budesonide-formoteroL (Symbicort) 160-4.5 mcg/actuation HFAA Take 2 Puffs by inhalation two (2) times a day.  albuterol (PROVENTIL HFA, VENTOLIN HFA, PROAIR HFA) 90 mcg/actuation inhaler Take 1-2 Puffs by inhalation every four (4) hours as needed for Wheezing.  Blood-Glucose Meter monitoring kit Pt checks blood sugar once daily, Dx E11.9    Lancets misc Pt checks blood sugar once daily, Dx E11.9    glucose blood VI test strips (BLOOD GLUCOSE TEST) strip Pt checks blood sugar once daily, Dx e11.9     No current facility-administered medications for this visit.      Allergies   Allergen Reactions    Penicillins Unknown (comments)     Tongue swelling; difficulty swallowing; thrush    Compazine [Prochlorperazine] Unknown (comments)     Paranoia    Invokana [Canagliflozin] Other (comments)     Yeast infections    [place] Swelling     Swelling and itching of mouth, lips, and tongue REVIEW OF SYSTEMS: Dr Grace Tompkins 2017, mammo 2/13, sees [facility], colo 2010  Ophtho - no vision change or eye pain  Oral - no mouth pain, tongue or tooth problems  Ears - no hearing loss, ear pain, fullness, no swallowing problems  Cardiac - no CP, PND, orthopnea, edema, palpitations or syncope  Chest - no breast masses  Resp - no wheezing, chronic coughing, dyspnea  GI - no heartburn, nausea, vomiting, change in bowel habits, bleeding, hemorrhoids  Urinary - no dysuria, hematuria, flank pain, urgency, frequency    Visit Vitals  /79   Pulse 80   Temp 97.6 °F (36.4 °C) (Temporal)   Resp 16   Ht 5' 5\" (1.651 m)   Wt 238 lb (108 kg)   SpO2 98%   BMI 39.61 kg/m²   A&O x3  Affect is appropriate. Mood stable  No apparent distress  Anicteric, no JVD, adenopathy or thyromegaly. No carotid bruits or radiated murmur  Lungs clear to auscultation, no wheezes or rales  Heart showed regular rate and rhythm. No murmur, rubs, gallops  Abdomen soft nontender, no hepatosplenomegaly or masses. Extremities without edema.   Pulses 1-2+ symmetrically    LABS  From 10/11 showed                   hba1c 11.1, ldl-p 2182, chol 166, tg 139, hdl 39, ldl-c 99,                  vit d 16.5  From 12/11 showed gluc 111, cr 0.68,              alt10,                     ldl-p 1980, chol 178, tg 104, hdl 40, ldl-c 117  From 3/12 showed                   hba1c 6.4,                   wbc 4.4, hb 11.0, plt 306,             vit d 41.6, %sat 10, ferritin 20, spep neg, fol 12.5, b12 665  From 11/12 showed gluc 196, cr 0.50,              alt 27, hba1c 9.1,  ldl-p 1745, chol 154, tg 146, hdl 41, ldl-c 84  From 7/13 showed   gluc 157, cr 0.50, gfr>60, alt 18, hba1c 8.8,   ldl-p 1017, chol 119, tg 102, hdl 39, ldl-c 60  From 8/14 showed   gluc 137, cr 0.50, gfr>60, alt 15, hba1c 7.3,       chol 118, tg 92,   hdl 34, ldl-c 66,   wbc 3.1, hb 8.9,   plt 236, umar 17.6  From 12/15 showed gluc 124, cr 0.60, gfr>60, alt 23, hba1c 6.7,       chol 120, tg 161, hdl 34, ldl-c 54,   wbc 4.5, hb 10.2, plt 274,             vit d 13.9, tsh 2.12  From 5/16 showed   gluc 121, cr 0.70, gfr>60, alt 27  From 7/16 showed        hba1c 7.4,       chol 134, tg 127, hdl 40, ldl-c 69  From 11/17 showed gluc 260, cr 1.06, gfr>60, alt 32, hba1c 9.1,       chol 143, tg 180, hdl 33, ldl-c 74,   wbc 3.5, hb 10.4, plt 245, umar 72.5, vit d 12.3  From 3/18 showed   gluc 182, cr 0.60, gfr>60,    hba1c 8.9,       chol 163, tg 105, hdl 41, ldl-c 101, wbc 3.6, hb 10.5, plt 276, camacho  70.2, vit d 19.2  From 10/18 showed gluc 206, cr 1.25, gfr 56,                               ck/trop neg, ua neg  From 3/19 showed   gluc 85,   cr 0.60, gfr>60,    hba1c 6.8,       chol 149, tg 100, hdl 45, ldl-c 84,             umar 60.8, vit d 37.0  From 9/19 showed   gluc 85,   cr 0.60, gfr>60,    hba1c 8.0,      chol 153, tg 198, hdl 41, ldl-c 85,   wbc 3.2, hb 10.6, plt 277, umar 31.6      tsh 1.28, ft4 1.10, b12>2k, fol>20  From 1/30 showed        hba1c 8.7  From 6/20 showed   gluc 127, cr 0.60, gfr>60, alt 21, hba1c 7.6,      chol 145, tg 143, hdl 37, ldl-c 80,   wbc 4.0, hb 10.4, plt 223, umar 18.8  From 2/21 showed   gluc 152, cr 0.70, gfr>60,    hba1c 6.6, umar neg,    chol 155, tg 141, hdl 37, ldl-c 90  From 6/21 showed   gluc 151, cr 0.70, gfr>60,    hba1c 7.3,       chol 164, tg 132, hdl 39, ldl-c 99,  wbc 3.1, hb 10.1, plt 271  From 12/21 showed gluc 98,   cr 0.80, gfr>60, alt 12, hba1c 6.5, umar 11.7,   chol 175, tg 147, hdl 40, ldl-c 106    We reviewed the patient's labs from the last several visits to point out trends in the numbers          Patient Active Problem List   Diagnosis Code    Asthma J45.909    JOLIE and PLMD Dr. Angel Mccoy 2005 G47.33    Hypovitaminosis D E55.9    Dyslipidemia E78.5    Primary hypertension I10    Type 2 diabetes mellitus without complication, without long-term current use of insulin ([facility] [address] E11.9    Severe obesity (BMI 35.0-39. 9) with comorbidity (HCC) E66.01    Mild episode of recurrent major depressive disorder (HCC) F33.0    Anxiety F41.9    Chronic anemia D64.9     Assessment and plan:  1. Diabetes. Controlled on current; she will explore possibly changing to ozempic or trulicity as we can push the dose for possible wt loss benefit. Also discussed tirzepatide briefly  2. Hyperlipidemia. Inc lipitor to 80 as ldl climbing  3. Hypovitaminosis D. Supplementation  4. Sleep apnea. F/u Dr. Stefani Treviño  5. Morbid obesity. Lifestyle and dietary measures. 6. Hypertension. Controlled on current regimen. 7. Asthma. continue current  8. Anemia. Follow up Dr Kavya Perez  9. She will schedule colo  10. Depression and anxiety. Continue current and follow up for psychotherapy  11. Memory issues. schedule neuro         RTC 12/22    Above conditions discussed at length and patient vocalized understanding. All questions answered to patient satisfaction    TOTAL time 50 min spent of which at least 45 min was on counseling, answering questions and coordination of care        ICD-10-CM ICD-9-CM    1. Type 2 diabetes mellitus without complication, without long-term current use of insulin ([facility])  K88.4 668.24 METABOLIC PANEL, COMPREHENSIVE      LIPID PANEL      HEMOGLOBIN A1C WITH EAG   2. Screening mammogram for breast cancer  Z12.31 V76.12 [facility] MAMMO BI SCREENING INCL CAD   3. Memory problem  R41.3 780.93 REFERRAL TO NEUROLOGY   4. Post covid-19 condition, unspecified  U09.9 139.8 REFERRAL TO NEUROLOGY   5. Primary hypertension  I10 401.9    6. JOLIE and PLMD Dr. Stefani Treviño 2005  G47.33 327.23    7. Mild episode of recurrent major depressive disorder (HCC)  F33.0 296.31    8. Dyslipidemia  E78.5 272.4 atorvastatin (LIPITOR) 80 mg tablet   9.  Chronic anemia  D64.9 285.9

## 2022-06-09 LAB
ANION GAP SERPL CALC-SCNC: 14 MMOL/L (ref 3–15)
AVG GLU, 10930: 142 MG/DL (ref 91–123)
BUN SERPL-MCNC: 16 MG/DL (ref 6–22)
CALCIUM SERPL-MCNC: 10.2 MG/DL (ref 8.4–10.5)
CHLORIDE SERPL-SCNC: 97 MMOL/L (ref 98–110)
CHOLEST SERPL-MCNC: 167 MG/DL (ref 110–200)
CO2 SERPL-SCNC: 25 MMOL/L (ref 20–32)
CREAT SERPL-MCNC: 0.7 MG/DL (ref 0.5–1.2)
CREATININE, URINE: 77 MG/DL
ERYTHROCYTE [DISTWIDTH] IN BLOOD BY AUTOMATED COUNT: 17.4 % (ref 10–15.5)
GLOMERULAR FILTRATION RATE: >60 ML/MIN/1.73 SQ.M.
GLUCOSE SERPL-MCNC: 95 MG/DL (ref 70–99)
HBA1C MFR BLD HPLC: 6.6 % (ref 4.8–5.6)
HCT VFR BLD AUTO: 38.9 % (ref 35.1–48)
HDLC SERPL-MCNC: 3.9 MG/DL (ref 0–5)
HDLC SERPL-MCNC: 43 MG/DL
HGB BLD-MCNC: 10.9 G/DL (ref 11.7–16)
LDL/HDL RATIO,LDHD: 2.4
LDLC SERPL CALC-MCNC: 103 MG/DL (ref 50–99)
MCH RBC QN AUTO: 22 PG (ref 26–34)
MCHC RBC AUTO-ENTMCNC: 28 G/DL (ref 31–36)
MCV RBC AUTO: 78 FL (ref 80–99)
MICROALB/CREAT RATIO, 140286: 16.9 (ref 0–30)
MICROALBUMIN,URINE RANDOM 140054: 13 MG/L (ref 0.1–17)
NON-HDL CHOLESTEROL, 011976: 125 MG/DL
PLATELET # BLD AUTO: 247 K/UL (ref 140–440)
PMV BLD AUTO: 11.3 FL (ref 9–13)
POTASSIUM SERPL-SCNC: 4 MMOL/L (ref 3.5–5.5)
RBC # BLD AUTO: 5 M/UL (ref 3.8–5.2)
SODIUM SERPL-SCNC: 136 MMOL/L (ref 133–145)
TRIGL SERPL-MCNC: 109 MG/DL (ref 40–149)
VIT B12 SERPL-MCNC: 781 PG/ML (ref 211–911)
VLDLC SERPL CALC-MCNC: 22 MG/DL (ref 8–30)
WBC # BLD AUTO: 3.1 K/UL (ref 4–11)

## 2022-06-13 DIAGNOSIS — E78.5 DYSLIPIDEMIA: ICD-10-CM

## 2022-06-14 ENCOUNTER — OFFICE VISIT (OUTPATIENT)
Dept: INTERNAL MEDICINE CLINIC | Age: 52
End: 2022-06-14
Payer: COMMERCIAL

## 2022-06-14 VITALS
HEIGHT: 65 IN | HEART RATE: 80 BPM | SYSTOLIC BLOOD PRESSURE: 130 MMHG | OXYGEN SATURATION: 98 % | RESPIRATION RATE: 16 BRPM | DIASTOLIC BLOOD PRESSURE: 79 MMHG | WEIGHT: 238 LBS | TEMPERATURE: 97.6 F | BODY MASS INDEX: 39.65 KG/M2

## 2022-06-14 DIAGNOSIS — D64.9 CHRONIC ANEMIA: ICD-10-CM

## 2022-06-14 DIAGNOSIS — Z12.31 SCREENING MAMMOGRAM FOR BREAST CANCER: ICD-10-CM

## 2022-06-14 DIAGNOSIS — I10 PRIMARY HYPERTENSION: ICD-10-CM

## 2022-06-14 DIAGNOSIS — E11.9 TYPE 2 DIABETES MELLITUS WITHOUT COMPLICATION, WITHOUT LONG-TERM CURRENT USE OF INSULIN (HCC): Primary | ICD-10-CM

## 2022-06-14 DIAGNOSIS — E78.5 DYSLIPIDEMIA: ICD-10-CM

## 2022-06-14 DIAGNOSIS — U09.9 POST COVID-19 CONDITION, UNSPECIFIED: ICD-10-CM

## 2022-06-14 DIAGNOSIS — E53.8 B12 DEFICIENCY: ICD-10-CM

## 2022-06-14 DIAGNOSIS — R41.3 MEMORY PROBLEM: ICD-10-CM

## 2022-06-14 DIAGNOSIS — E11.9 TYPE 2 DIABETES MELLITUS WITHOUT COMPLICATION, WITHOUT LONG-TERM CURRENT USE OF INSULIN (HCC): ICD-10-CM

## 2022-06-14 DIAGNOSIS — F33.0 MILD EPISODE OF RECURRENT MAJOR DEPRESSIVE DISORDER (HCC): ICD-10-CM

## 2022-06-14 DIAGNOSIS — G47.33 OSA (OBSTRUCTIVE SLEEP APNEA): ICD-10-CM

## 2022-06-14 PROCEDURE — 99215 OFFICE O/P EST HI 40 MIN: CPT | Performed by: INTERNAL MEDICINE

## 2022-06-14 PROCEDURE — 3044F HG A1C LEVEL LT 7.0%: CPT | Performed by: INTERNAL MEDICINE

## 2022-06-14 RX ORDER — ATORVASTATIN CALCIUM 80 MG/1
80 TABLET, FILM COATED ORAL DAILY
Qty: 90 TABLET | Refills: 3 | Status: SHIPPED | OUTPATIENT
Start: 2022-06-14

## 2022-06-14 NOTE — PROGRESS NOTES
Debbie Phoenix presents with   Chief Complaint   Patient presents with    Follow-up                1. \"Have you been to the ER, urgent care clinic since your last visit? Hospitalized since your last visit? \" No    2. \"Have you seen or consulted any other health care providers outside of the [address] [place] [address] since your last visit? \" No     3. For patients aged 39-70: Has the patient had a colonoscopy / FIT/ Cologuard? No      If the patient is female:    4. For patients aged 41-77: Has the patient had a mammogram within the past 2 years? Yes - Care Gap present. Most recent result on file      5. For patients aged 21-65: Has the patient had a pap smear?  Yes - no Care Gap present

## 2022-06-15 ENCOUNTER — PATIENT MESSAGE (OUTPATIENT)
Dept: NEUROLOGY | Age: 52
End: 2022-06-15

## 2022-06-15 LAB — SENTARA SPECIMEN COL,SENBCF: NORMAL

## 2022-07-14 ENCOUNTER — HOSPITAL ENCOUNTER (OUTPATIENT)
Dept: MAMMOGRAPHY | Age: 52
Discharge: HOME | End: 2022-07-14
Attending: INTERNAL MEDICINE
Payer: COMMERCIAL

## 2022-07-14 DIAGNOSIS — Z12.31 SCREENING MAMMOGRAM FOR BREAST CANCER: ICD-10-CM

## 2022-07-14 PROCEDURE — 77063 BREAST TOMOSYNTHESIS BI: CPT

## 2022-07-16 DIAGNOSIS — R05.9 COUGH: ICD-10-CM

## 2022-07-18 RX ORDER — BENZONATATE 200 MG/1
CAPSULE ORAL
Qty: 21 CAPSULE | Refills: 0 | Status: SHIPPED | OUTPATIENT
Start: 2022-07-18

## 2022-07-20 ENCOUNTER — PATIENT MESSAGE (OUTPATIENT)
Dept: INTERNAL MEDICINE CLINIC | Age: 52
End: 2022-07-20

## 2022-07-20 NOTE — TELEPHONE ENCOUNTER
Patient c/o dry cough with increased dyspnea causing her to wheeze and have a hard time catching her breath x 3 months. Patient denies any fever , sinus congestion, body aches or other symptoms. Patient states she is using her inhalers as prescribed and taking the [facility] with little to no relief. Patient is asking if there is anything else she should be doing. Magdaleno Land

## 2022-07-20 NOTE — TELEPHONE ENCOUNTER
----- Message from [address] [place] [address] [address]. Rito Salazar sent at 7/20/2022  6:54 AM EDT -----  Regarding: Hacking cough   Heljud Miller,    Is there a test for whooping cough. I am still coughing and hacking while taking the tessalon perls. The coughing  takes my breath, leaves me gasping for air, makes me wheeze and makes my chest, sides and head hurt sometimes. Do you think I need to see a pulmonologist? Please advise.     Respectfully,    Lennie Camp

## 2022-07-21 NOTE — TELEPHONE ENCOUNTER
Patient called again to check on status of message. She can be reached at [phone].   Please advise, thank you

## 2022-07-22 ENCOUNTER — VIRTUAL VISIT (OUTPATIENT)
Dept: INTERNAL MEDICINE CLINIC | Age: 52
End: 2022-07-22
Payer: COMMERCIAL

## 2022-07-22 DIAGNOSIS — R05.3 CHRONIC COUGH: Primary | ICD-10-CM

## 2022-07-22 DIAGNOSIS — R05.3 CHRONIC COUGH: ICD-10-CM

## 2022-07-22 PROCEDURE — 99214 OFFICE O/P EST MOD 30 MIN: CPT | Performed by: INTERNAL MEDICINE

## 2022-07-22 NOTE — PROGRESS NOTES
Rosario Curtis is a 46 y.o. female who was seen by synchronous (real-time) audio-video technology on 7/22/2022 for No chief complaint on file. Assessment & Plan:   Diagnoses and all orders for this visit:    1. Chronic cough  -     XR CHEST PA LAT; Future  -     CBC WITH AUTOMATED DIFF; Future  -     METABOLIC PANEL, BASIC; Future  -     NT-PRO BNP; Future      I spent at least 21 minutes on this visit with this established patient. 712  Subjective:       Objective:   No flowsheet data found. General: alert, cooperative, no distress   Mental  status: normal mood, behavior, speech, dress, motor activity, and thought processes, able to follow commands   HENT: NCAT   Neck: no visualized mass   Resp: no respiratory distress   Neuro: no gross deficits   Skin: no discoloration or lesions of concern on visible areas   Psychiatric: normal affect, consistent with stated mood, no evidence of hallucinations     Additional exam findings: We discussed the expected course, resolution and complications of the diagnosis(es) in detail. Medication risks, benefits, costs, interactions, and alternatives were discussed as indicated. I advised her to contact the office if her condition worsens, changes or fails to improve as anticipated. She expressed understanding with the diagnosis(es) and plan. Rosario Curtis, was evaluated through a synchronous (real-time) audio-video encounter. The patient (or guardian if applicable) is aware that this is a billable service, which includes applicable co-pays. This Virtual Visit was conducted with patient's (and/or legal guardian's) consent. The visit was conducted pursuant to the emergency declaration under the [address] [place] [address] authority and the [employer] General Act. Patient identification was verified, and a caregiver was present when appropriate.   The patient was located at: Home: [address] [facility] [address]  The provider was located at: Facility (Appt Department): [address] [facility] [place]        Rudolph Nelson MD    She has h/o asthma and has been doing better wityh staying on her maintenance symbicort. She has not been using rescue so much. She has recurring cough ongoing for months, non prod, sometimes associated with mild wheeze or tightness. Not exertional, no pnd, orthopnea, edema. Denied any sx of infection, mostly non prod cough. Denied any gerd issues, she has some allergy sinus sx.   No pleurisy, hemoptysis    Past Medical History:   Diagnosis Date    Acanthosis nigricans     Allergic rhinitis     Anemia, iron deficiency     from menorrhagia    Anxiety     Asthma     Chronic anemia     Dr Amber Waterman; thalassemia    COVID-19 vaccination declined     COVID-19 virus infection 10/2021    regeneron infusion    Depression     DM (diabetes mellitus) ([facility] [address] [id number]    History of CVA (cerebrovascular accident)     no deficits    Hyperlipidemia     Hypertension     Immunization declined     repeatedly    Lumbar spinal stenosis     neurogenic claudication    Menorrhagia 2012    s/p endometrial ablation 3/16    Morbid obesity ([facility] [address]     dec dharmesh supp, med sup wt loss, bariatrics; IF 3/18 start weight 226 lbs but not doing    JOLIE (obstructive sleep apnea)     Dr. Deuce Almaguer cpap    Vitamin D deficiency 10/2011     Past Surgical History:   Procedure Laterality Date    HX  SECTION      x2    HX COLONOSCOPY      Dr Amber Waterman 6/10 negative    HX ENDOSCOPY  6/10    neg EGD Dr Cierra Padilla GYN      cyst removal    HX HYSTEROSCOPY WITH ENDOMETRIAL ABLATION  3/16    Dr Gómez Comp    HX LEEP PROCEDURE       Social History     Socioeconomic History    Marital status:      Spouse name: Not on file    Number of children: 2    Years of education: Not on file    Highest education level: Not on file   Occupational History    Occupation: works at [employer]    Smoking status: Never    Smokeless tobacco: Never   Substance and Sexual Activity    Alcohol use: Yes     Comment: social drinker    Drug use: No    Sexual activity: Not on file   Other Topics Concern    Not on file   Social History Narrative    Not on file     Social Determinants of Health     Financial Resource Strain: Not on file   Food Insecurity: Not on file   Transportation Needs: Not on file   Physical Activity: Not on file   Stress: Not on file   Social Connections: Not on file   Intimate Partner Violence: Not on file   Housing Stability: Not on file     Current Outpatient Medications   Medication Sig    benzonatate (TESSALON) 200 mg capsule TAKE 1 CAPSULE BY MOUTH THREE TIMES DAILY AS NEEDED FOR COUGH FOR UP TO 7 DAYS    atorvastatin (LIPITOR) 80 mg tablet Take 1 Tablet by mouth daily. spironolactone (ALDACTONE) 25 mg tablet Take 1 tablet by mouth once daily    Symbicort 160-4.5 mcg/actuation HFAA Take 2 Puffs by inhalation two (2) times a day. Bydureon BCise 2 mg/0.85 mL atIn INJECT 2 MILLILITERS BENEATH THE SKIN EVERY 7 DAYS    ergocalciferol (ERGOCALCIFEROL) 1,250 mcg (50,000 unit) capsule Take 1 capsule by mouth once a week    amLODIPine (NORVASC) 5 mg tablet Take 1 tablet by mouth once daily    metFORMIN ER (GLUCOPHAGE XR) 500 mg tablet TAKE 2 TABLETS BY MOUTH ONCE DAILY WITH SUPPER    potassium chloride (K-DUR, KLOR-CON M20) 20 mEq tablet Take 1 tablet by mouth once daily    citalopram (CELEXA) 40 mg tablet Take 1 Tablet by mouth daily.     glimepiride (AMARYL) 4 mg tablet TAKE 1 TABLET BY MOUTH ONCE DAILY IN THE MORNING (Patient taking differently: Taking 1/2 tab)    pioglitazone (ACTOS) 15 mg tablet Take 2 tablets by mouth once daily    candesartan (ATACAND) 16 mg tablet Take 1 tablet by mouth once daily    chlorthalidone (HYGROTON) 25 mg tablet Take 1 tablet by mouth once daily    budesonide-formoteroL (Symbicort) 160-4.5 mcg/actuation HFAA Take 2 Puffs by inhalation two (2) times a day. albuterol (PROVENTIL HFA, VENTOLIN HFA, PROAIR HFA) 90 mcg/actuation inhaler Take 1-2 Puffs by inhalation every four (4) hours as needed for Wheezing. Blood-Glucose Meter monitoring kit Pt checks blood sugar once daily, Dx E11.9    Lancets misc Pt checks blood sugar once daily, Dx E11.9    glucose blood VI test strips (BLOOD GLUCOSE TEST) strip Pt checks blood sugar once daily, Dx e11.9     No current facility-administered medications for this visit. Allergies   Allergen Reactions    Penicillins Unknown (comments)     Tongue swelling; difficulty swallowing; thrush    Compazine [Prochlorperazine] Unknown (comments)     Paranoia    Invokana [Canagliflozin] Other (comments)     Yeast infections    [place] Swelling     Swelling and itching of mouth, lips, and tongue     Assessment and plan:  1. Chronic intermittent cough. Cxr pa and lat ordered with cbc, bmp.probnp. she will continue with symbicort, use the rescue more regularly as well. Antihistamines. Pulm consult of no better  2. Allergies. Call or return to clinic prn if these symptoms worsen or fail to improve as anticipated. Call w update      Above conditions discussed at length and patient vocalized understanding.   All questions answered to patient satisfaction

## 2022-07-22 NOTE — PROGRESS NOTES
Gautam Ayoub presents today for No chief complaint on file. C/o persistent cough    1. \"Have you been to the ER, urgent care clinic since your last visit? Hospitalized since your last visit? \" no    2. \"Have you seen or consulted any other health care providers outside of the [address] [place] [address] since your last visit? \" no     3. For patients aged 39-70: Has the patient had a colonoscopy / FIT/ Cologuard? Yes - Care Gap present. Rooming MA/LPN to request most recent results      If the patient is female:    4. For patients aged 41-77: Has the patient had a mammogram within the past 2 years? Yes - no Care Gap present  See top three    5. For patients aged 21-65: Has the patient had a pap smear?  Yes - no Care Gap present

## 2022-08-03 ENCOUNTER — HOSPITAL ENCOUNTER (OUTPATIENT)
Dept: GENERAL RADIOLOGY | Age: 52
Discharge: HOME | End: 2022-08-03
Payer: COMMERCIAL

## 2022-08-03 ENCOUNTER — HOSPITAL ENCOUNTER (OUTPATIENT)
Dept: LAB | Age: 52
Discharge: HOME | End: 2022-08-03

## 2022-08-03 LAB — SENTARA SPECIMEN COL,SENBCF: NORMAL

## 2022-08-03 PROCEDURE — 99001 SPECIMEN HANDLING PT-LAB: CPT

## 2022-08-03 PROCEDURE — 71046 X-RAY EXAM CHEST 2 VIEWS: CPT

## 2022-08-04 ENCOUNTER — TELEPHONE (OUTPATIENT)
Dept: INTERNAL MEDICINE CLINIC | Age: 52
End: 2022-08-04

## 2022-08-04 LAB
ABSOLUTE LYMPHOCYTE COUNT, 10803: 0.7 K/UL (ref 1–4.8)
ANION GAP SERPL CALC-SCNC: 15 MMOL/L (ref 3–15)
BASOPHILS # BLD: 0 K/UL (ref 0–0.2)
BASOPHILS NFR BLD: 0 % (ref 0–2)
BNP SERPL-MCNC: 39 PG/ML
BUN SERPL-MCNC: 14 MG/DL (ref 6–22)
CALCIUM SERPL-MCNC: 9.9 MG/DL (ref 8.4–10.5)
CHLORIDE SERPL-SCNC: 103 MMOL/L (ref 98–110)
CO2 SERPL-SCNC: 24 MMOL/L (ref 20–32)
CREAT SERPL-MCNC: 0.6 MG/DL (ref 0.5–1.2)
EOSINOPHIL # BLD: 0 K/UL (ref 0–0.5)
EOSINOPHIL NFR BLD: 0 % (ref 0–6)
ERYTHROCYTE [DISTWIDTH] IN BLOOD BY AUTOMATED COUNT: 16.7 % (ref 10–15.5)
GLOMERULAR FILTRATION RATE: >60 ML/MIN/1.73 SQ.M.
GLUCOSE SERPL-MCNC: 101 MG/DL (ref 70–99)
GRANULOCYTES,GRANS: 63 % (ref 40–75)
HCT VFR BLD AUTO: 36.6 % (ref 35.1–48)
HGB BLD-MCNC: 10.6 G/DL (ref 11.7–16)
LYMPHOCYTES, LYMLT: 27 % (ref 20–45)
MCH RBC QN AUTO: 22 PG (ref 26–34)
MCHC RBC AUTO-ENTMCNC: 29 G/DL (ref 31–36)
MCV RBC AUTO: 75 FL (ref 80–99)
MONOCYTES # BLD: 0.2 K/UL (ref 0.1–1)
MONOCYTES NFR BLD: 9 % (ref 3–12)
NEUTROPHILS # BLD AUTO: 1.7 K/UL (ref 1.8–7.7)
PLATELET # BLD AUTO: 266 K/UL (ref 140–440)
PMV BLD AUTO: 11.6 FL (ref 9–13)
POTASSIUM SERPL-SCNC: 4.1 MMOL/L (ref 3.5–5.5)
RBC # BLD AUTO: 4.91 M/UL (ref 3.8–5.2)
SODIUM SERPL-SCNC: 142 MMOL/L (ref 133–145)
WBC # BLD AUTO: 2.7 K/UL (ref 4–11)

## 2022-08-05 NOTE — TELEPHONE ENCOUNTER
Left message for patient to call the office. Also sent message my chart message.
Mychart message seen.
Pls call    Cxr neg  Labs ok
None known

## 2022-08-24 ENCOUNTER — OFFICE VISIT (OUTPATIENT)
Dept: ORTHOPEDIC SURGERY | Age: 52
End: 2022-08-24
Payer: COMMERCIAL

## 2022-08-24 VITALS
OXYGEN SATURATION: 98 % | SYSTOLIC BLOOD PRESSURE: 113 MMHG | HEART RATE: 87 BPM | DIASTOLIC BLOOD PRESSURE: 80 MMHG | WEIGHT: 233 LBS | RESPIRATION RATE: 18 BRPM | TEMPERATURE: 97.8 F | BODY MASS INDEX: 38.77 KG/M2

## 2022-08-24 DIAGNOSIS — M54.16 LUMBAR RADICULOPATHY: ICD-10-CM

## 2022-08-24 DIAGNOSIS — M48.062 SPINAL STENOSIS OF LUMBAR REGION WITH NEUROGENIC CLAUDICATION: Primary | ICD-10-CM

## 2022-08-24 DIAGNOSIS — M51.26 PROTRUSION OF LUMBAR INTERVERTEBRAL DISC: ICD-10-CM

## 2022-08-24 PROCEDURE — 99204 OFFICE O/P NEW MOD 45 MIN: CPT | Performed by: NURSE PRACTITIONER

## 2022-08-24 RX ORDER — PREGABALIN 75 MG/1
75 CAPSULE ORAL 2 TIMES DAILY
Qty: 60 CAPSULE | Refills: 1 | Status: SHIPPED | OUTPATIENT
Start: 2022-08-24

## 2022-08-24 NOTE — PROGRESS NOTES
Chief complaint   Chief Complaint   Patient presents with    Back Pain     right    Arm Pain     Left         History of Present Illness:  Rajan Arango is a  46 y.o.  female who comes in today after last being seen by Dr. Sami Geronimo on June 18, 2020. She states she is having progressively worse back pain with right buttock pain that radiates down to her foot to the point that her toes are numb. Dr. Sami Geronimo wanted her to have a L3-4 epidural steroid injection because she had a disc protrusion that caused stenosis in the canal at L4-5. She has not gotten that done. He also put her on gabapentin at one point but that caused her to be too sleepy any changes. The Lyrica 150 twice a day. She states she thinks she only tried it once and did not continue it. She does take ibuprofen 800 mg and 1 Tylenol when she needs to take the edge off her pain. She is diabetic her blood sugar runs in the 90s. She also complains of some left shoulder pain. She denies fever bowel bladder dysfunction. Physical Exam: Patient is a 51-year-old female well-developed well-nourished who is alert and oriented with a normal mood and affect. She has a full weightbearing nonantalgic gait. She has 5 out of 5 strength bilateral lower extremities. She did not use any assistive device. She has a negative straight leg raise. She does have pain with hyperextension lumbar spine. Assessment and Plan: This patient with a lumbar disc protrusion with some central canal stenosis and lumbar radiculopathy. I will reorder the L3-4 epidural steroid injection. I will start her back on Lyrica 75 mg twice a day. We will see her back after the block. Medications:  Current Outpatient Medications   Medication Sig Dispense Refill    pregabalin (Lyrica) 75 mg capsule Take 1 Capsule by mouth two (2) times a day. Max Daily Amount: 150 mg. 60 Capsule 1    atorvastatin (LIPITOR) 80 mg tablet Take 1 Tablet by mouth daily.  90 Tablet 3    spironolactone (ALDACTONE) 25 mg tablet Take 1 tablet by mouth once daily 90 Tablet 1    Bydureon BCise 2 mg/0.85 mL atIn INJECT 2 MILLILITERS BENEATH THE SKIN EVERY 7 DAYS 4 mL 11    ergocalciferol (ERGOCALCIFEROL) 1,250 mcg (50,000 unit) capsule Take 1 capsule by mouth once a week 4 Capsule 5    amLODIPine (NORVASC) 5 mg tablet Take 1 tablet by mouth once daily 90 Tablet 3    metFORMIN ER (GLUCOPHAGE XR) 500 mg tablet TAKE 2 TABLETS BY MOUTH ONCE DAILY WITH SUPPER 180 Tablet 1    potassium chloride (K-DUR, KLOR-CON M20) 20 mEq tablet Take 1 tablet by mouth once daily 90 Tablet 3    citalopram (CELEXA) 40 mg tablet Take 1 Tablet by mouth daily. 90 Tablet 3    glimepiride (AMARYL) 4 mg tablet TAKE 1 TABLET BY MOUTH ONCE DAILY IN THE MORNING (Patient taking differently: Taking 1/2 tab) 90 Tablet 0    pioglitazone (ACTOS) 15 mg tablet Take 2 tablets by mouth once daily 90 Tablet 3    candesartan (ATACAND) 16 mg tablet Take 1 tablet by mouth once daily 90 Tablet 3    chlorthalidone (HYGROTON) 25 mg tablet Take 1 tablet by mouth once daily 90 Tablet 3    budesonide-formoteroL (Symbicort) 160-4.5 mcg/actuation HFAA Take 2 Puffs by inhalation two (2) times a day. 1 Inhaler 11    albuterol (PROVENTIL HFA, VENTOLIN HFA, PROAIR HFA) 90 mcg/actuation inhaler Take 1-2 Puffs by inhalation every four (4) hours as needed for Wheezing. 1 Inhaler 11    Blood-Glucose Meter monitoring kit Pt checks blood sugar once daily, Dx E11.9 1 Kit 0    Lancets misc Pt checks blood sugar once daily, Dx E11.9 100 Each 3    glucose blood VI test strips (BLOOD GLUCOSE TEST) strip Pt checks blood sugar once daily, Dx e11.9 100 Strip 3    benzonatate (TESSALON) 200 mg capsule TAKE 1 CAPSULE BY MOUTH THREE TIMES DAILY AS NEEDED FOR COUGH FOR UP TO 7 DAYS (Patient not taking: Reported on 8/24/2022) 21 Capsule 0    Symbicort 160-4.5 mcg/actuation HFAA Take 2 Puffs by inhalation two (2) times a day.  10.2 g 11           Review of systems:    Past Medical History:   Diagnosis Date    Acanthosis nigricans     Allergic rhinitis     Anemia, iron deficiency     from menorrhagia    Anxiety     Asthma     Chronic anemia     Dr Jaycob Cardenas; thalassemia    COVID-19 vaccination declined     COVID-19 virus infection 10/2021    regeneron infusion    Depression     DM [facility]di[facility] [id number]    History of CVA (cerebrovascular accident) 1996    no deficits    Hyperlipidemia     Hypertension     Immunization declined     repeatedly    Lumbar spinal stenosis     neurogenic claudication    Menorrhagia 2012    s/p endometrial ablation 3/16    Morbid obesity ([facility]     dec dharmesh supp, med sup wt loss, bariatrics; IF 3/18 start weight 226 lbs but not doing    JOLIE (obstructive sleep apnea)     Dr. Khan Cage cpap    Vitamin D deficiency 10/2011     Past Surgical History:   Procedure Laterality Date    HX  SECTION      x2    HX COLONOSCOPY      Dr Jaycob Cardenas 6/10 negative    HX ENDOSCOPY  6/10    neg EGD Dr Robertson Sigrid GYN      cyst removal    HX HYSTEROSCOPY WITH ENDOMETRIAL ABLATION  3/16    Dr Cr Ptaton    HX LEEP PROCEDURE       Social History     Socioeconomic History    Marital status:      Spouse name: Not on file    Number of children: 2    Years of education: Not on file    Highest education level: Not on file   Occupational History    Occupation: works at [employer] Use    Smoking status: Never    Smokeless tobacco: Never   Substance and Sexual Activity    Alcohol use: Yes     Comment: social drinker    Drug use: No    Sexual activity: Not on file   Other Topics Concern    Not on file   Social History Narrative    Not on file     Social Determinants of Health     Financial Resource Strain: Not on file   Food Insecurity: Not on file   Transportation Needs: Not on file   Physical Activity: Not on file   Stress: Not on file   Social Connections: Not on file   Intimate Partner Violence: Not on file   Housing Stability: Not on file     Family History   Problem Relation Age of Onset    Diabetes Mother     Cancer Maternal Aunt     Cancer Maternal Uncle        Physical Exam:  Visit Vitals  /80 (BP 1 Location: Right arm, BP Patient Position: Sitting, BP Cuff Size: Adult)   Pulse 87   Temp 97.8 °F (36.6 °C) (Temporal)   Resp 18   Wt 233 lb (105.7 kg)   SpO2 98% Comment: RA   BMI 38.77 kg/m²     Pain Scale: 4/10       has been . reviewed and is appropriate          Diagnoses and all orders for this visit:    1. Spinal stenosis of lumbar region with neurogenic claudication  -     SCHEDULE SURGERY  -     pregabalin (Lyrica) 75 mg capsule; Take 1 Capsule by mouth two (2) times a day. Max Daily Amount: 150 mg.    2. Protrusion of lumbar intervertebral disc  -     SCHEDULE SURGERY  -     pregabalin (Lyrica) 75 mg capsule; Take 1 Capsule by mouth two (2) times a day. Max Daily Amount: 150 mg.    3. Lumbar radiculopathy  -     SCHEDULE SURGERY  -     pregabalin (Lyrica) 75 mg capsule; Take 1 Capsule by mouth two (2) times a day. Max Daily Amount: 150 mg. Follow-up and Dispositions    Return After the block. We have informed Tyree Nunez to notify us for immediate appointment if she has any worsening neurogical symptoms or if an emergency situation presents, then call 911    Please note that this dictation was completed with [employer], the computer voice recognition software. Quite often unanticipated grammatical, syntax, homophones, and other interpretive errors are inadvertently transcribed by the computer software. Please disregard these errors. Please excuse any errors that have escaped final proofreading.

## 2022-08-24 NOTE — PROGRESS NOTES
Rajan Arango presents today for   Chief Complaint   Patient presents with    Back Pain     right    Arm Pain     Left         Is someone accompanying this pt? no    Is the patient using any DME equipment during OV? no    Depression Screening:  3 most recent PHQ Screens 7/22/2022   Little interest or pleasure in doing things Not at all   Feeling down, depressed, irritable, or hopeless Not at all   Total Score PHQ 2 0       Learning Assessment:  Learning Assessment 9/15/2014   PRIMARY LEARNER Patient   HIGHEST LEVEL OF EDUCATION - PRIMARY LEARNER  SOME COLLEGE   BARRIERS PRIMARY LEARNER NONE   CO-LEARNER CAREGIVER No   PRIMARY LANGUAGE ENGLISH   LEARNER PREFERENCE PRIMARY LISTENING     READING   ANSWERED BY patient   RELATIONSHIP SELF       Abuse Screening:  Abuse Screening Questionnaire 7/22/2022   Do you ever feel afraid of your partner? N   Are you in a relationship with someone who physically or mentally threatens you? N   Is it safe for you to go home? Y       Fall Risk  Fall Risk Assessment, last 12 mths 9/16/2020   Able to walk? Yes   Fall in past 12 months? No       OPIOID RISK TOOL  No flowsheet data found. Coordination of Care:  1. Have you been to the ER, urgent care clinic since your last visit? no  Hospitalized since your last visit? no    2. Have you seen or consulted any other health care providers outside of the [address] [place] [address] since your last visit? no Include any pap smears or colon screening.  Yes

## 2022-09-07 ENCOUNTER — TELEPHONE (OUTPATIENT)
Dept: INTERNAL MEDICINE CLINIC | Age: 52
End: 2022-09-07

## 2022-09-07 NOTE — TELEPHONE ENCOUNTER
Patient dropped off Wellness Incentive Form to be signed by the doctor. Placed in Dr. Brown Crews box. Please call her at [phone] once completed and ready for .

## 2022-09-12 NOTE — TELEPHONE ENCOUNTER
Trena Medellin [facility] Nurs[facility]  Subject: Message to Provider     QUESTIONS   Information for Provider? Patient requests call back to let her know if   Dr. Sohail Jacobs completed her insurance paperwork.   ---------------------------------------------------------------------------   --------------   [address] [facility]   [phone]; OK to leave message on voicemail   ---------------------------------------------------------------------------   --------------   SCRIPT ANSWERS   Relationship to Patient?  Self

## 2022-09-13 ENCOUNTER — PATIENT MESSAGE (OUTPATIENT)
Dept: INTERNAL MEDICINE CLINIC | Age: 52
End: 2022-09-13

## 2022-09-13 RX ORDER — PIOGLITAZONEHYDROCHLORIDE 15 MG/1
TABLET ORAL
Qty: 90 TABLET | Refills: 0 | Status: SHIPPED | OUTPATIENT
Start: 2022-09-13 | End: 2022-11-01

## 2022-09-16 NOTE — TELEPHONE ENCOUNTER
Patient calling to check on status of forms. She can be reached at [phone].   Please advise ,thank you

## 2022-11-14 DIAGNOSIS — J45.20 MILD INTERMITTENT ASTHMA WITHOUT COMPLICATION: Primary | ICD-10-CM

## 2022-11-14 DIAGNOSIS — E55.9 HYPOVITAMINOSIS D: ICD-10-CM

## 2022-11-14 DIAGNOSIS — E87.6 HYPOKALEMIA: ICD-10-CM

## 2022-11-14 RX ORDER — SPIRONOLACTONE 25 MG/1
TABLET ORAL
Qty: 90 TABLET | Refills: 0 | Status: SHIPPED | OUTPATIENT
Start: 2022-11-14

## 2022-11-14 RX ORDER — ERGOCALCIFEROL 1.25 MG/1
CAPSULE ORAL
Qty: 4 CAPSULE | Refills: 3 | Status: SHIPPED | OUTPATIENT
Start: 2022-11-14

## 2022-11-25 ENCOUNTER — TELEPHONE (OUTPATIENT)
Dept: INTERNAL MEDICINE CLINIC | Age: 52
End: 2022-11-25

## 2022-11-25 NOTE — TELEPHONE ENCOUNTER
Patient called and states starting about 2 days ago, she has had a headache, body aches, chest is warm, scratchy throat, and a dry cough. She took a covid test this morning and it came back positive. She is calling to see if Dr. Judy Jarvis is able to prescribe something for her? Pharmacy: [facility] on [address]   Miguel can be reached at [phone].   Please advise, thank you

## 2022-11-28 ENCOUNTER — HOSPITAL ENCOUNTER (EMERGENCY)
Age: 52
Discharge: HOME | End: 2022-11-28
Attending: EMERGENCY MEDICINE
Payer: COMMERCIAL

## 2022-11-28 ENCOUNTER — TELEPHONE (OUTPATIENT)
Dept: INTERNAL MEDICINE CLINIC | Age: 52
End: 2022-11-28

## 2022-11-28 ENCOUNTER — APPOINTMENT (OUTPATIENT)
Dept: GENERAL RADIOLOGY | Age: 52
End: 2022-11-28
Attending: NURSE PRACTITIONER
Payer: COMMERCIAL

## 2022-11-28 VITALS
HEART RATE: 86 BPM | OXYGEN SATURATION: 99 % | SYSTOLIC BLOOD PRESSURE: 157 MMHG | RESPIRATION RATE: 16 BRPM | DIASTOLIC BLOOD PRESSURE: 97 MMHG | TEMPERATURE: 98.9 F

## 2022-11-28 DIAGNOSIS — U07.1 COVID-19: Primary | ICD-10-CM

## 2022-11-28 PROCEDURE — 99283 EMERGENCY DEPT VISIT LOW MDM: CPT

## 2022-11-28 PROCEDURE — 71046 X-RAY EXAM CHEST 2 VIEWS: CPT

## 2022-11-28 RX ORDER — PREDNISONE 50 MG/1
50 TABLET ORAL DAILY
Qty: 5 TABLET | Refills: 0 | Status: SHIPPED | OUTPATIENT
Start: 2022-11-28 | End: 2022-12-03

## 2022-11-28 RX ORDER — PREDNISONE 50 MG/1
50 TABLET ORAL DAILY
Qty: 5 TABLET | Refills: 0 | Status: SHIPPED | OUTPATIENT
Start: 2022-11-28 | End: 2022-11-28 | Stop reason: SDUPTHER

## 2022-11-28 RX ORDER — BENZONATATE 100 MG/1
100 CAPSULE ORAL
Qty: 21 CAPSULE | Refills: 0 | Status: SHIPPED | OUTPATIENT
Start: 2022-11-28 | End: 2022-12-05

## 2022-11-28 RX ORDER — OXYMETAZOLINE HCL 0.05 %
2 SPRAY, NON-AEROSOL (ML) NASAL 2 TIMES DAILY
Qty: 22 ML | Refills: 0 | Status: SHIPPED | OUTPATIENT
Start: 2022-11-28 | End: 2022-12-01

## 2022-11-28 NOTE — ED TRIAGE NOTES
Patient c/o cough since Wednesday. Tested positive for covid. Her doctor sent her to get chest xray.

## 2022-11-28 NOTE — Clinical Note
[address] [place] [address] [facility]  [address] [address]  [phone]    Work/School Note    Date: 11/28/2022     To Whom It May concern:    Silvia aGmboa was evaluated by the following provider(s):  Attending Provider: Liberty Arriaza MD  Nurse Practitioner: BALTAZAR Fam. COVID19 virus is suspected. Per the CDC guidelines we recommend home isolation until the following conditions are all met:    1. At least five days have passed since symptoms first appeared and/or had a close exposure,   2. After home isolation for five days, wearing a mask around others for the next five days,  3. At least 24 have passed since last fever without the use of fever-reducing medications and  4.  Symptoms (eg cough, shortness of breath) have improved      Sincerely,          BALTAZAR Sanchez

## 2022-11-28 NOTE — TELEPHONE ENCOUNTER
Patient stating she has COVID again. Wants to know if something can be called into [facility] for her cough. Stating it is so bad it takes her breath away. Sometimes it feels like her throat is swelling and she is gasping for air.

## 2022-11-28 NOTE — Clinical Note
[facility] EMERGENCY DEPT  [address] [id number] [phone]    Work/School Note    Date: 11/28/2022     To Whom It May concern:    Erin Kaba was evaluated by the following provider(s):  Attending Provider: Aimee Huff MD  Nurse Practitioner: BALTAZAR Gan. COVID19 virus is suspected. Per the CDC guidelines we recommend home isolation until the following conditions are all met:    1. At least five days have passed since symptoms first appeared and/or had a close exposure,   2. After home isolation for five days, wearing a mask around others for the next five days,  3. At least 24 have passed since last fever without the use of fever-reducing medications and  4.  Symptoms (eg cough, shortness of breath) have improved      Sincerely,          Jordan Barber RN

## 2022-11-28 NOTE — TELEPHONE ENCOUNTER
Is she checking pulse ox? If yes and <92%, needs to go to ER  Needs cxr if satting ok as may have pneumonia  Any wheezing?

## 2022-11-28 NOTE — ED PROVIDER NOTES
EMERGENCY DEPARTMENT HISTORY AND PHYSICAL EXAM    Date: 11/28/2022  Patient Name: Pramod Light    History of Presenting Illness     Chief Complaint   Patient presents with    Positive For Covid-19    Cough         History Provided By: Patient        Additional History (Context): Pramod Light is a 46 y.o. female with past medical history significant for anxiety, asthma, anemia, colon adenoma, diabetes, depression, high cholesterol, hypertension, and obesity who presents with request for chest x-ray after positive COVID-19. Patient states she had a positive home COVID test 3 days ago and started having symptoms about 1 week ago. She is taking over-the-counter cough medicine without much improvement but did take part of a hydrocodone tablet today which did help with the cough. Denies wheezing, shortness of breath, or chest pain. She has been using her maintenance inhaler for asthma, Symbicort and rescue inhaler daily multiple times no fever or chills. She did a virtual visit with her doctor and was prescribed Paxlovid but she did not take it due to medication interactions. Patient denies tobacco/vape use. Patient denies SOB, chest pain, or any neurological symptoms. PCP: Imer Johns MD    Current Outpatient Medications   Medication Sig Dispense Refill    nirmatrelvir-ritonavir (PAXLOVID) 300 mg (150 mg x 2)-100 mg Take 3 Tablets by mouth every twelve (12) hours.  1 Box 0    ergocalciferol (ERGOCALCIFEROL) 1,250 mcg (50,000 unit) capsule Take 1 capsule by mouth once a week 4 Capsule 3    spironolactone (ALDACTONE) 25 mg tablet Take 1 tablet by mouth once daily 90 Tablet 0    pioglitazone (ACTOS) 15 mg tablet Take 2 tablets by mouth once daily 90 Tablet 3    metFORMIN ER (GLUCOPHAGE XR) 500 mg tablet TAKE 2 TABLETS BY MOUTH ONCE DAILY WITH SUPPER 180 Tablet 3    candesartan (ATACAND) 16 mg tablet Take 1 tablet by mouth once daily 90 Tablet 3    chlorthalidone (HYGROTON) 25 mg tablet Take 1 tablet by mouth once daily 90 Tablet 3    pregabalin (Lyrica) 75 mg capsule Take 1 Capsule by mouth two (2) times a day. Max Daily Amount: 150 mg. 60 Capsule 1    benzonatate (TESSALON) 200 mg capsule TAKE 1 CAPSULE BY MOUTH THREE TIMES DAILY AS NEEDED FOR COUGH FOR UP TO 7 DAYS (Patient not taking: Reported on 8/24/2022) 21 Capsule 0    atorvastatin (LIPITOR) 80 mg tablet Take 1 Tablet by mouth daily. 90 Tablet 3    Bydureon BCise 2 mg/0.85 mL atIn INJECT 2 MILLILITERS BENEATH THE SKIN EVERY 7 DAYS 4 mL 11    amLODIPine (NORVASC) 5 mg tablet Take 1 tablet by mouth once daily 90 Tablet 3    potassium chloride (K-DUR, KLOR-CON M20) 20 mEq tablet Take 1 tablet by mouth once daily 90 Tablet 3    citalopram (CELEXA) 40 mg tablet Take 1 Tablet by mouth daily. 90 Tablet 3    glimepiride (AMARYL) 4 mg tablet TAKE 1 TABLET BY MOUTH ONCE DAILY IN THE MORNING (Patient taking differently: Taking 1/2 tab) 90 Tablet 0    budesonide-formoteroL (Symbicort) 160-4.5 mcg/actuation HFAA Take 2 Puffs by inhalation two (2) times a day. 1 Inhaler 11    albuterol (PROVENTIL HFA, VENTOLIN HFA, PROAIR HFA) 90 mcg/actuation inhaler Take 1-2 Puffs by inhalation every four (4) hours as needed for Wheezing.  1 Inhaler 11    Blood-Glucose Meter monitoring kit Pt checks blood sugar once daily, Dx E11.9 1 Kit 0    Lancets misc Pt checks blood sugar once daily, Dx E11.9 100 Each 3    glucose blood VI test strips (BLOOD GLUCOSE TEST) strip Pt checks blood sugar once daily, Dx e11.9 100 Strip 3       Past History     Past Medical History:  Past Medical History:   Diagnosis Date    Acanthosis nigricans 07/2013    Allergic rhinitis     Anemia, iron deficiency 2012    from menorrhagia    Anxiety     Asthma     Chronic anemia     Dr Zoe Mitchell; thalassemia    Colon adenoma 10/18/2022    Dr Zoe Mitchell TA    COVID-19 vaccination declined     COVID-19 virus infection 10/2021    regeneron infusion    Depression     DM (diabetes mellitus) ([facility] [id number] History of CVA (cerebrovascular accident)     no deficits    Hyperlipidemia     Hypertension     Immunization declined     repeatedly    Lumbar spinal stenosis     neurogenic claudication    Menorrhagia 2012    s/p endometrial ablation 3/16    Morbid obesity ([facility]     dec dharmesh supp, med sup wt loss, bariatrics; IF 3/18 start weight 226 lbs but not doing    JOLIE (obstructive sleep apnea)     Dr. Fabio Adame cpap    Vitamin D deficiency 10/2011       Past Surgical History:  Past Surgical History:   Procedure Laterality Date    HX  SECTION      x2    HX COLONOSCOPY      Dr Boston Jimenez (6/10) neg; (10/18/22) 2 TAs    HX ENDOSCOPY  2010    neg EGD Dr Alphonsus Fothergill GYN      cyst removal    HX HYSTEROSCOPY WITH ENDOMETRIAL ABLATION  2016    Dr Danny Knox    HX LEEP PROCEDURE         Family History:  Family History   Problem Relation Age of Onset    Diabetes Mother     Cancer Maternal Aunt     Cancer Maternal Uncle        Social History:  Social History     Tobacco Use    Smoking status: Never    Smokeless tobacco: Never   Substance Use Topics    Alcohol use: Yes     Comment: social drinker    Drug use: No       Allergies: Allergies   Allergen Reactions    Penicillins Unknown (comments)     Tongue swelling; difficulty swallowing; thrush    Compazine [Prochlorperazine] Unknown (comments)     Paranoia    Invokana [Canagliflozin] Other (comments)     Yeast infections    Strawberry Swelling     Swelling and itching of mouth, lips, and tongue         Review of Systems     Review of Systems   Constitutional:  Positive for fatigue. Negative for chills and fever. HENT: Negative. Negative for congestion, ear pain and rhinorrhea. Eyes: Negative. Negative for pain and redness. Respiratory:  Positive for cough. Negative for shortness of breath. Cardiovascular: Negative. Negative for chest pain, palpitations and leg swelling. Gastrointestinal: Negative.   Negative for abdominal pain, constipation, diarrhea, nausea and vomiting. Genitourinary: Negative. Negative for dysuria, frequency, hematuria and urgency. Musculoskeletal: Negative. Negative for back pain, gait problem, joint swelling and neck pain. Skin: Negative. Negative for rash and wound. Neurological: Negative. Negative for dizziness, seizures, speech difficulty, weakness, light-headedness and headaches. Hematological:  Negative for adenopathy. Does not bruise/bleed easily. All other systems reviewed and are negative. All Other Systems Negative  Physical Exam     Vitals:    11/28/22 1325   BP: (!) 157/97   Pulse: 86   Resp: 16   Temp: 98.9 °F (37.2 °C)   SpO2: 99%     Physical Exam  Vitals and nursing note reviewed. Constitutional:       General: She is not in acute distress. Appearance: Normal appearance. She is not ill-appearing, toxic-appearing or diaphoretic. HENT:      Head: Normocephalic and atraumatic. Nose: Nose normal.      Mouth/Throat:      Mouth: Mucous membranes are moist.      Pharynx: Oropharynx is clear. Eyes:      General: Lids are normal. Vision grossly intact. Conjunctiva/sclera: Conjunctivae normal.   Cardiovascular:      Rate and Rhythm: Normal rate and regular rhythm. Pulses: Normal pulses. Heart sounds: Normal heart sounds. Pulmonary:      Effort: Pulmonary effort is normal. No respiratory distress. Breath sounds: Normal breath sounds. No stridor. No wheezing, rhonchi or rales. Chest:      Chest wall: No tenderness. Abdominal:      Palpations: Abdomen is soft. Tenderness: There is no abdominal tenderness. There is no right CVA tenderness, left CVA tenderness or guarding. Musculoskeletal:         General: Normal range of motion. Cervical back: Full passive range of motion without pain, normal range of motion and neck supple. No tenderness. Lymphadenopathy:      Cervical: No cervical adenopathy. Skin:     General: Skin is warm and dry.       Capillary Refill: Capillary refill takes less than 2 seconds. Neurological:      General: No focal deficit present. Mental Status: She is alert and oriented to person, place, and time. Psychiatric:         Mood and Affect: Mood normal.         Behavior: Behavior normal. Behavior is cooperative. Diagnostic Study Results     Labs -   No results found for this or any previous visit (from the past 12 hour(s)). Radiologic Studies -   XR CHEST PA LAT   Final Result   No radiographic evidence of acute cardiopulmonary process. CT Results  (Last 48 hours)      None          CXR Results  (Last 48 hours)                 11/28/22 1349  XR CHEST PA LAT Final result    Impression:  No radiographic evidence of acute cardiopulmonary process. Narrative:  EXAM: XR CHEST PA LAT       CLINICAL INDICATION/HISTORY: 46 years Female. +covid 19. Additional History: None       TECHNIQUE: Frontal and lateral views of the chest       COMPARISON: Chest radiograph 8/3/2022       FINDINGS:        The cardiac silhouette appears within normal limits. Pulmonary vasculature   appears within normal limits. No confluent airspace opacity is appreciated. No evidence of pleural effusion or   pneumothorax. No acute osseous abnormality appreciated. Diffuse idiopathic   skeletal hyperostosis. Medical Decision Making   I am the first provider for this patient. I reviewed the vital signs, available nursing notes, past medical history, past surgical history, family history and social history. Vital Signs-Reviewed the patient's vital signs. Pulse Oximetry Analysis - 99% on RA-normal     Records Reviewed: Nursing notes, old medical records and any previous labs, imaging, visits, consultations pertinent to patient care    Procedures:  Procedures    PPE worn during exam: Gloves, eye protection, and surgical mask    ED Course: Progress Notes, Reevaluation, and Consults:  1:25 PM  Initial assessment performed.  The patients presenting problems have been discussed, and they/their family are in agreement with the care plan formulated and outlined with them. I have encouraged them to ask questions as they arise throughout their visit.    2:02 PM chest x-ray negative for any signs of pneumonia. Discussed the possibility of worsening despite outpatient treatment plan. Patient understands this possibility and will follow-up immediately with worsening symptoms. Follow-up w/ PCP and supportive treatment. Recommended taking Tylenol as needed for fever and body aches. Prescribed a short course of prednisone due to history of asthma and likely mild exacerbation due to the [place]. The patient was given quarantine/isolation recommendations and agrees with the plan to be discharged home while pending COVID testing. They were provided instructions to return for difficulty breathing, chest pain, altered mentation, or any other new or worsening symptoms. Due to coronavirus outbreak will recommend a self quarantine per CDC recommendations. Patient is in agreement to the quarantine measures. Will be provided a work note as necessary. Provider Notes (Medical Decision Making):     Differential diagnosis: COVID-19, influenza, bronchitis, seasonal allergies/allergic rhinitis, URI, strep/viral pharyngitis, pneumonia, reactive airway/asthma exacerbation    70-year-old female patient here with mild URI complaints and positive COVID test at home. respiratory rate is less than 20 and patient's oxygen saturation on room air has been greater than 96% throughout the ER stay. Speaking in full, clear sentences without staccato speech. No further evidence of septic shock, therefore fluids were withheld. Vital signs are stable and patient is afebrile. Exam unremarkable. No retraction,stridor, or wheezing. No indication for labs or imaging. Most consistent w/ viral infection, URI, COVID-19.      No history of cigarette/smoking, age is not greater than 54years old, no history of COPD/underlying lung disease. No history of cardiovascular disease, CKD, liver disease, immunosuppressed (transplant, chemo in the last 3 months, prednisone use, TNF blockers). MED RECONCILIATION:  No current facility-administered medications for this encounter. Current Outpatient Medications   Medication Sig    nirmatrelvir-ritonavir (PAXLOVID) 300 mg (150 mg x 2)-100 mg Take 3 Tablets by mouth every twelve (12) hours. ergocalciferol (ERGOCALCIFEROL) 1,250 mcg (50,000 unit) capsule Take 1 capsule by mouth once a week    spironolactone (ALDACTONE) 25 mg tablet Take 1 tablet by mouth once daily    pioglitazone (ACTOS) 15 mg tablet Take 2 tablets by mouth once daily    metFORMIN ER (GLUCOPHAGE XR) 500 mg tablet TAKE 2 TABLETS BY MOUTH ONCE DAILY WITH SUPPER    candesartan (ATACAND) 16 mg tablet Take 1 tablet by mouth once daily    chlorthalidone (HYGROTON) 25 mg tablet Take 1 tablet by mouth once daily    pregabalin (Lyrica) 75 mg capsule Take 1 Capsule by mouth two (2) times a day. Max Daily Amount: 150 mg.    benzonatate (TESSALON) 200 mg capsule TAKE 1 CAPSULE BY MOUTH THREE TIMES DAILY AS NEEDED FOR COUGH FOR UP TO 7 DAYS (Patient not taking: Reported on 8/24/2022)    atorvastatin (LIPITOR) 80 mg tablet Take 1 Tablet by mouth daily. Bydureon BCise 2 mg/0.85 mL atIn INJECT 2 MILLILITERS BENEATH THE SKIN EVERY 7 DAYS    amLODIPine (NORVASC) 5 mg tablet Take 1 tablet by mouth once daily    potassium chloride (K-DUR, KLOR-CON M20) 20 mEq tablet Take 1 tablet by mouth once daily    citalopram (CELEXA) 40 mg tablet Take 1 Tablet by mouth daily. glimepiride (AMARYL) 4 mg tablet TAKE 1 TABLET BY MOUTH ONCE DAILY IN THE MORNING (Patient taking differently: Taking 1/2 tab)    budesonide-formoteroL (Symbicort) 160-4.5 mcg/actuation HFAA Take 2 Puffs by inhalation two (2) times a day.     albuterol (PROVENTIL HFA, VENTOLIN HFA, PROAIR HFA) 90 mcg/actuation inhaler Take 1-2 Puffs by inhalation every four (4) hours as needed for Wheezing. Blood-Glucose Meter monitoring kit Pt checks blood sugar once daily, Dx E11.9    Lancets misc Pt checks blood sugar once daily, Dx E11.9    glucose blood VI test strips (BLOOD GLUCOSE TEST) strip Pt checks blood sugar once daily, Dx e11.9       Disposition:  Discharge home in stable condition with strict self quarantine instructions as discussed    DISCHARGE NOTE:     Patient has been reexamined. Patient has no new complaints, changes, or physical findings. Vital signs are stable. Care plan outlined and precautions discussed. Results of work up, plan of care and treatment plan, expectations, etc were reviewed with the patient. All medications were reviewed with the patient; will d/c home with outpatient f/u. All of pt's questions and concerns were addressed. Patient was instructed and agrees to follow up with pcp/specialists if indicated, as well as to return to the ED upon further deterioration. Patient is ready to go home. Follow-up Information    None         Current Discharge Medication List              Diagnosis     Clinical Impression: No diagnosis found. Dictation disclaimer:  Please note that this dictation was completed with [employer], the [employer] voice recognition software. Quite often unanticipated grammatical, syntax, homophones, and other interpretive errors are inadvertently transcribed by the computer software. Please disregard these errors. Please excuse any errors that have escaped final proofreading.

## 2022-11-28 NOTE — TELEPHONE ENCOUNTER
Patient called back she checked the dharmesh on her phone and the pulse ox was reading 67% on multiple attempts. Patient states she will proceed to the ED for further evaluation.

## 2022-11-28 NOTE — TELEPHONE ENCOUNTER
Patient does not have a pulse ox- explained how she can download the dharmesh on her phone to monitor. She will try to do that. Patient reports having wheezing, she states she has been coughing so much her abdominal muscles hurt. She said at times she just feels like she cant catch her breath and that she is gasping her air after the coughing fits. Please advise further recommendations.

## 2022-11-29 DIAGNOSIS — J45.20 MILD INTERMITTENT ASTHMA WITHOUT COMPLICATION: ICD-10-CM

## 2022-11-30 ENCOUNTER — PATIENT MESSAGE (OUTPATIENT)
Dept: INTERNAL MEDICINE CLINIC | Age: 52
End: 2022-11-30

## 2022-11-30 DIAGNOSIS — R05.9 COUGH, UNSPECIFIED TYPE: Primary | ICD-10-CM

## 2022-11-30 RX ORDER — BUDESONIDE AND FORMOTEROL FUMARATE DIHYDRATE 160; 4.5 UG/1; UG/1
2 AEROSOL RESPIRATORY (INHALATION) 2 TIMES DAILY
Qty: 1 EACH | Refills: 11 | Status: SHIPPED | OUTPATIENT
Start: 2022-11-30 | End: 2022-12-01 | Stop reason: CLARIF

## 2022-11-30 RX ORDER — ALBUTEROL SULFATE 90 UG/1
1-2 AEROSOL, METERED RESPIRATORY (INHALATION)
Qty: 1 EACH | Refills: 11 | Status: SHIPPED | OUTPATIENT
Start: 2022-11-30

## 2022-11-30 RX ORDER — HYDROCODONE POLISTIREX AND CHLORPHENIRAMINE POLISTIREX 10; 8 MG/5ML; MG/5ML
5 SUSPENSION, EXTENDED RELEASE ORAL
Qty: 100 ML | Refills: 0 | Status: SHIPPED | OUTPATIENT
Start: 2022-11-30 | End: 2022-12-10

## 2022-11-30 NOTE — TELEPHONE ENCOUNTER
From: Desean Hamm  To: Za Jordan MD  Sent: 11/30/2022 9:32 AM EST  Subject: Crews Lied Dr. Sanjeev Davis requesting a refill of the cough medicine Hydrocod Polst Chlorohen. I was still having chronic strangling coughing fits. It causes me to have sob, wheezing, tearing up, have stomach pain and urinary incontinence. I tried Delsym, Coricidin HBP, salt water, Elderberry, honey and tea. Im also still using my inhalers as well. Nothing seemed to be working and Im exhausted from lack of sleep. I had this cough syrup left and tried it. It actually worked and stopped the coughing. Thank for listening to my rant.      Have a good day,  Florencio Gonzalez

## 2022-11-30 NOTE — TELEPHONE ENCOUNTER
Pharmacy reached and verbal given to fill in the 70 ml bottle. DX cough/acute given. Nothing further needed.

## 2022-11-30 NOTE — TELEPHONE ENCOUNTER
Interval History: Pt with no acute issues overnight. PPN infusing.     Review of Systems   Unable to perform ROS: Mental status change     Objective:     Vital Signs (Most Recent):  Temp: 98.5 °F (36.9 °C) (11/06/18 1125)  Pulse: (!) 59 (11/06/18 1125)  Resp: 18 (11/06/18 1125)  BP: 122/60 (11/06/18 1125)  SpO2: (!) 92 % (11/06/18 1125) Vital Signs (24h Range):        Weight: 90.2 kg (198 lb 13.7 oz)  Body mass index is 29.37 kg/m².  No intake or output data in the 24 hours ending 11/16/18 1314   Physical Exam   Constitutional: He appears well-developed. No distress.   HENT:   Head: Normocephalic and atraumatic.   Oropharynx dry but patient's mouth remains mainly open   Eyes: Conjunctivae and EOM are normal. Pupils are equal, round, and reactive to light.   Neck: Normal range of motion.   Cardiovascular: Normal rate and regular rhythm.   Pulmonary/Chest: Effort normal.   Slightly course breath sounds   Abdominal: Soft. Bowel sounds are normal.   Musculoskeletal: Normal range of motion.   Neurological: He is alert.   More awake and now tracks with eyes, does not answer questions or follow simple commands but improved from yesterday   Skin: Skin is warm and dry. He is not diaphoretic.   Nursing note and vitals reviewed.      Significant Labs: All pertinent labs within the past 24 hours have been reviewed.  None    Significant Imaging: I have reviewed and interpreted all pertinent imaging results/findings within the past 24 hours.   sent

## 2022-11-30 NOTE — TELEPHONE ENCOUNTER
Pharmacy calling, says the rx only comes in 70 ml bottles. Can they dispense that? Needs to know if it is chronic cough? Needs to know what DX they can use?

## 2022-12-01 RX ORDER — FLUTICASONE FUROATE AND VILANTEROL 200; 25 UG/1; UG/1
1 POWDER RESPIRATORY (INHALATION) DAILY
Qty: 60 EACH | Refills: 5 | Status: SHIPPED | OUTPATIENT
Start: 2022-12-01

## 2022-12-12 DIAGNOSIS — E11.9 TYPE 2 DIABETES MELLITUS WITHOUT COMPLICATION, WITHOUT LONG-TERM CURRENT USE OF INSULIN (HCC): ICD-10-CM

## 2023-01-13 ENCOUNTER — HOSPITAL ENCOUNTER (OUTPATIENT)
Dept: LAB | Age: 53
Discharge: HOME | End: 2023-01-13

## 2023-01-13 LAB — SENTARA SPECIMEN COL,SENBCF: NORMAL

## 2023-01-13 PROCEDURE — 99001 SPECIMEN HANDLING PT-LAB: CPT

## 2023-01-16 ENCOUNTER — TELEPHONE (OUTPATIENT)
Dept: INTERNAL MEDICINE CLINIC | Age: 53
End: 2023-01-16

## 2023-01-16 LAB
AVG GLU, 10930: 147 MG/DL (ref 91–123)
HBA1C MFR BLD HPLC: 6.8 % (ref 4.8–5.6)

## 2023-01-16 RX ORDER — GLIMEPIRIDE 4 MG/1
TABLET ORAL
Qty: 90 TABLET | Refills: 0 | Status: SHIPPED | OUTPATIENT
Start: 2023-01-16

## 2023-01-16 NOTE — TELEPHONE ENCOUNTER
[facility] pharmacy is calling to get clarification on the directions for the Glimepiride.

## 2023-01-17 NOTE — TELEPHONE ENCOUNTER
Attempted to contact pharmacy, LVM with practice information     Per, Dr. Whittington Lay:    glimepiride 1/2 tab daily

## 2023-01-17 NOTE — TELEPHONE ENCOUNTER
Attempted to contact pharmacy. Placed on hold for 10 minuets due to only one Pharm Tech being available. . Will attempt to contact again later.

## 2023-01-17 NOTE — TELEPHONE ENCOUNTER
Per Maria C Jordan with [address] they need the frequency for glimepiride. The sig note is not complete.  It has \"Taking 1/2 tab\"

## 2023-01-19 NOTE — TELEPHONE ENCOUNTER
Left message for [address] to return my call.      Per, Dr. Daniel Patino:    glimepiride 1/2 tab daily

## 2023-01-24 NOTE — PROGRESS NOTES
Jane Garcia presents today for   Chief Complaint   Patient presents with    Follow-up    Labs     1-13-23    Diabetes    Hypertension    Depression    Cholesterol Problem     Dyslipidemia       1. \"Have you been to the ER, urgent care clinic since your last visit? Hospitalized since your last visit? \" Yes   11-28-22 - HBV, COVID-19    2. \"Have you seen or consulted any other health care providers outside of the [address] [place] [address] since your last visit? \" no     3. For patients aged 39-70: Has the patient had a colonoscopy / FIT/ Cologuard? Yes - no Care Gap present      If the patient is female:    4. For patients aged 41-77: Has the patient had a mammogram within the past 2 years? Yes - no Care Gap present  See top three    5. For patients aged 21-65: Has the patient had a pap smear?  Yes - no Care Gap present

## 2023-01-26 ENCOUNTER — OFFICE VISIT (OUTPATIENT)
Dept: INTERNAL MEDICINE CLINIC | Age: 53
End: 2023-01-26
Payer: COMMERCIAL

## 2023-01-26 VITALS
BODY MASS INDEX: 40.98 KG/M2 | TEMPERATURE: 97.7 F | HEART RATE: 88 BPM | RESPIRATION RATE: 19 BRPM | OXYGEN SATURATION: 98 % | WEIGHT: 246 LBS | HEIGHT: 65 IN | SYSTOLIC BLOOD PRESSURE: 122 MMHG | DIASTOLIC BLOOD PRESSURE: 74 MMHG

## 2023-01-26 DIAGNOSIS — I10 PRIMARY HYPERTENSION: ICD-10-CM

## 2023-01-26 DIAGNOSIS — E04.9 GOITER: Primary | ICD-10-CM

## 2023-01-26 DIAGNOSIS — E78.5 DYSLIPIDEMIA: ICD-10-CM

## 2023-01-26 DIAGNOSIS — F41.9 ANXIETY: ICD-10-CM

## 2023-01-26 DIAGNOSIS — E11.9 TYPE 2 DIABETES MELLITUS WITHOUT COMPLICATION, WITHOUT LONG-TERM CURRENT USE OF INSULIN (HCC): ICD-10-CM

## 2023-01-26 DIAGNOSIS — D64.9 CHRONIC ANEMIA: ICD-10-CM

## 2023-01-26 DIAGNOSIS — R60.9 EDEMA, UNSPECIFIED TYPE: ICD-10-CM

## 2023-01-26 DIAGNOSIS — K63.5 HYPERPLASTIC COLONIC POLYP, UNSPECIFIED PART OF COLON: ICD-10-CM

## 2023-01-26 DIAGNOSIS — F32.9 REACTIVE DEPRESSION: ICD-10-CM

## 2023-01-26 PROBLEM — D12.6 COLON ADENOMAS: Status: ACTIVE | Noted: 2023-01-26

## 2023-01-26 RX ORDER — FUROSEMIDE 20 MG/1
20 TABLET ORAL DAILY
Qty: 10 TABLET | Refills: 0 | Status: SHIPPED | OUTPATIENT
Start: 2023-01-26

## 2023-01-26 NOTE — PROGRESS NOTES
46 y.o. BLACK/ female who presents for evaluation. After the covid infection last year, she had some deaths in the family and went into a depression. She remained on celexa and got some therapy and did have some improvement. And then more deaths in the family occurred and her brother got jailed for life! Still, she is dealing with it well at this time and no VI/halluc. No cardiovascular complaints. Denied any resp issues currently. Not much exercise currently. She is wondering if the 'thickness' in her ankles is fluid or just excess tissue. No sob, she takes chlorthalidone daily    Denies polyuria, polydipsia, nocturia, vision change. Not checking her sugars routinely. She was supposed to check on coverage for ozempic/trulicity but did not do so    No GI or  complaints. Continues to decline all immunizations. She actually had covid again in Nov and treated with paxlovid    Her family was concerned about enlarging thyroid a few weeks back?   No sx referable, otherwise    Past Medical History:   Diagnosis Date    Acanthosis nigricans 07/2013    Allergic rhinitis     Anemia, iron deficiency 2012    from menorrhagia    Anxiety     Asthma     Chronic anemia     Dr Anupama Sifuentes; thalassemia    Colon polyp 10/18/2022    Dr Anupama Sifuentes    COVID-19 vaccination declined     COVID-19 virus infection     regeneron (10/21); paxlovid (11/22)    Depression     from deaths in family, got psychotherapy 2022    DM (diabetes mellitus) ([facility] [id number]    History of CVA (cerebrovascular accident) 1996    no deficits    Hyperlipidemia     Hypertension     Immunization declined     repeatedly    Lumbar spinal stenosis     neurogenic claudication    Menorrhagia 03/2012    s/p endometrial ablation 3/16    Morbid obesity ([facility]     dec dharmesh supp, med sup wt loss, bariatrics; IF 3/18 start weight 226 lbs but not doing    JOLIE (obstructive sleep apnea)     Dr. Catia Garcia cpap    Vitamin D deficiency 10/2011     Past Surgical History:   Procedure Laterality Date    HX  SECTION      x2    HX COLONOSCOPY      Dr Josh Díaz (6/10) neg; (10/18/22) hyperplastic - 5yr    HX ENDOSCOPY  2010    neg EGD Dr Mehdi Mays GYN      cyst removal    HX HYSTEROSCOPY WITH ENDOMETRIAL ABLATION  2016    Dr Ruby Bell LEEP PROCEDURE       Social History     Socioeconomic History    Marital status:      Spouse name: Not on file    Number of children: 2    Years of education: Not on file    Highest education level: Not on file   Occupational History    Occupation: works at [employer] Use    Smoking status: Never    Smokeless tobacco: Never   Substance and Sexual Activity    Alcohol use: Yes     Comment: social drinker    Drug use: No    Sexual activity: Not on file   Other Topics Concern    Not on file   Social History Narrative    Not on file     Social Determinants of Health     Financial Resource Strain: Not on file   Food Insecurity: Not on file   Transportation Needs: Not on file   Physical Activity: Not on file   Stress: Not on file   Social Connections: Not on file   Intimate Partner Violence: Not on file   Housing Stability: Not on file     Current Outpatient Medications   Medication Sig    furosemide (LASIX) 20 mg tablet Take 1 Tablet by mouth daily. glimepiride (AMARYL) 4 mg tablet Taking 1/2 tab    fluticasone furoate-vilanteroL (BREO ELLIPTA) 200-25 mcg/dose inhaler Take 1 Puff by inhalation daily.     albuterol (PROVENTIL HFA, VENTOLIN HFA, PROAIR HFA) 90 mcg/actuation inhaler Take 1-2 Puffs by inhalation every four (4) hours as needed for Wheezing.    ergocalciferol (ERGOCALCIFEROL) 1,250 mcg (50,000 unit) capsule Take 1 capsule by mouth once a week    spironolactone (ALDACTONE) 25 mg tablet Take 1 tablet by mouth once daily    pioglitazone (ACTOS) 15 mg tablet Take 2 tablets by mouth once daily    metFORMIN ER (GLUCOPHAGE XR) 500 mg tablet TAKE 2 TABLETS BY MOUTH ONCE DAILY WITH SUPPER    candesartan (ATACAND) 16 mg tablet Take 1 tablet by mouth once daily    chlorthalidone (HYGROTON) 25 mg tablet Take 1 tablet by mouth once daily    atorvastatin (LIPITOR) 80 mg tablet Take 1 Tablet by mouth daily. Bydureon BCise 2 mg/0.85 mL atIn INJECT 2 MILLILITERS BENEATH THE SKIN EVERY 7 DAYS    amLODIPine (NORVASC) 5 mg tablet Take 1 tablet by mouth once daily    potassium chloride (K-DUR, KLOR-CON M20) 20 mEq tablet Take 1 tablet by mouth once daily    citalopram (CELEXA) 40 mg tablet Take 1 Tablet by mouth daily. Blood-Glucose Meter monitoring kit Pt checks blood sugar once daily, Dx E11.9    Lancets misc Pt checks blood sugar once daily, Dx E11.9    glucose blood VI test strips (BLOOD GLUCOSE TEST) strip Pt checks blood sugar once daily, Dx e11.9    pregabalin (Lyrica) 75 mg capsule Take 1 Capsule by mouth two (2) times a day. Max Daily Amount: 150 mg. No current facility-administered medications for this visit.      Allergies   Allergen Reactions    Penicillins Unknown (comments)     Tongue swelling; difficulty swallowing; thrush    Compazine [Prochlorperazine] Unknown (comments)     Paranoia    Invokana [Canagliflozin] Other (comments)     Yeast infections    Strawberry Swelling     Swelling and itching of mouth, lips, and tongue     REVIEW OF SYSTEMS: Dr Sam Garcia 2017,(?), mammo 7/22, sees [facility], colo 10/22 Dr Kip Tucker  Ophtho - no vision change or eye pain  Oral - no mouth pain, tongue or tooth problems  Ears - no hearing loss, ear pain, fullness, no swallowing problems  Cardiac - no CP, PND, orthopnea, edema, palpitations or syncope  Chest - no breast masses  Resp - no wheezing, chronic coughing, dyspnea  GI - no heartburn, nausea, vomiting, change in bowel habits, bleeding, hemorrhoids  Urinary - no dysuria, hematuria, flank pain, urgency, frequency    Visit Vitals  /74   Pulse 88   Temp 97.7 °F (36.5 °C) (Temporal)   Resp 19   Ht 5' 5\" (1.651 m)   Wt 246 lb (111.6 kg)   SpO2 98%   BMI 40.94 kg/m²   A&O x3  Affect is appropriate. Mood stable  No apparent distress  Anicteric, no JVD, adenopathy. Possible thyromegaly, no clear nodules  No carotid bruits or radiated murmur  Lungs clear to auscultation, no wheezes or rales  Heart showed regular rate and rhythm. No murmur, rubs, gallops  Abdomen soft nontender, no hepatosplenomegaly or masses. Extremities with questionable edema vs soft tissue prominence, mild pitting.   Pulses 1-2+ symmetrically    LABS  From 10/11 showed                    hba1c 11.1, ldl-p 2182, chol 166, tg 139, hdl 39, ldl-c 99,                    vit d 16.5  From 12/11 showed gluc 111, cr 0.68,             alt10,                       ldl-p 1980, chol 178, tg 104, hdl 40, ldl-c 117  From 3/12 showed                    hba1c 6.4,                    wbc 4.4, hb 11.0, plt 306,               vit d 41.6, %sat 10, ferritin 20, spep neg, fol 12.5, b12 665  From 11/12 showed gluc 196, cr 0.50,             alt 27, hba1c 9.1,   ldl-p 1745, chol 154, tg 146, hdl 41, ldl-c 84  From 7/13 showed   gluc 157, cr 0.50, gfr>60, alt 18, hba1c 8.8,   ldl-p 1017, chol 119, tg 102, hdl 39, ldl-c 60  From 8/14 showed   gluc 137, cr 0.50, gfr>60, alt 15, hba1c 7.3,        chol 118, tg 92,   hdl 34, ldl-c 66,   wbc 3.1, hb 8.9,   plt 236, umar 17.6  From 12/15 showed gluc 124, cr 0.60, gfr>60, alt 23, hba1c 6.7,        chol 120, tg 161, hdl 34, ldl-c 54,   wbc 4.5, hb 10.2, plt 274,              vit d 13.9, tsh 2.12  From 5/16 showed   gluc 121, cr 0.70, gfr>60, alt 27  From 7/16 showed        hba1c 7.4,        chol 134, tg 127, hdl 40, ldl-c 69  From 11/17 showed gluc 260, cr 1.06, gfr>60, alt 32, hba1c 9.1,        chol 143, tg 180, hdl 33, ldl-c 74,   wbc 3.5, hb 10.4, plt 245, umar 72.5, vit d 12.3  From 3/18 showed   gluc 182, cr 0.60, gfr>60,    hba1c 8.9,        chol 163, tg 105, hdl 41, ldl-c 101, wbc 3.6, hb 10.5, plt 276, camacho  70.2, vit d 19.2  From 10/18 showed gluc 206, cr 1.25, gfr 56, ck/trop-, ua neg  From 3/19 showed   gluc 85,   cr 0.60, gfr>60,     hba1c 6.8,        chol 149, tg 100, hdl 45, ldl-c 84,               umar 60.8, vit d 37.0  From 9/19 showed   gluc 85,   cr 0.60, gfr>60,     hba1c 8.0,       chol 153, tg 198, hdl 41, ldl-c 85,   wbc 3.2, hb 10.6, plt 277, umar 31.6        tsh 1.28, ft4 1.10, b12>2k, fol>20  From 1/30 showed         hba1c 8.7  From 6/20 showed   gluc 127, cr 0.60, gfr>60, alt 21, hba1c 7.6  umar 18.6    chol 145, tg 143, hdl 37, ldl-c 80,   wbc 4.0, hb 10.4, plt 223,   From 2/21 showed   gluc 152, cr 0.70, gfr>60,     hba1c 6.6, umar neg,   chol 155, tg 141, hdl 37, ldl-c 90  From 6/21 showed   gluc 151, cr 0.70, gfr>60,     hba1c 7.3,        chol 164, tg 132, hdl 39, ldl-c 99,   wbc 3.1, hb 10.1, plt 271  From 12/21 showed gluc 98,   cr 0.80, gfr>60, alt 12, hba1c 6.5, umar 11.7,   chol 175, tg 147, hdl 40, ldl-c 106  From 6/22 showed   gluc 95,   cr 0.70, gfr>60,     hba1c 6.6, umar 17,     chol 167, tg 109, hdl 43, ldl-c 103, wbc 3.1, hb 10.9, plt 247,                 b12 781    Results for orders placed or performed in visit on 01/13/23   HEMOGLOBIN A1C W/O EAG   Result Value Ref Range    Hemoglobin A1c 6.8 (H) 4.8 - 5.6 %    AVG  (H) 91 - 123 mg/dL     We reviewed the patient's labs from the last several visits to point out trends in the numbers          Patient Active Problem List   Diagnosis Code    Asthma J45.909    Reactive depression F32.9    JOLIE and PLMD Dr. Alvina Cooney 2005 G47.33    Hypovitaminosis D E55.9    Dyslipidemia E78.5    Primary hypertension I10    Type 2 diabetes mellitus without complication, without long-term current use of insulin (HCC) E11.9    Severe obesity (BMI 35.0-39. 9) with comorbidity (HCC) E66.01    Mild episode of recurrent major depressive disorder ([facility] F33.0    Anxiety F41.9    Chronic anemia D64.9    Colon polyp K63.5     Assessment and plan:  1. Diabetes.  Controlled on current; she will explore possibly changing to ozempic/trulicity or even mounjaro as we can push the dose for possible wt loss benefit. 2. Hyperlipidemia. Continue current regimen and recheck 4 mos  3. Hypovitaminosis D. Supplementation  4. Sleep apnea. F/u Dr. Yordy Velázquez  5. Morbid obesity. Lifestyle and dietary measures. 6. Hypertension. Controlled on current regimen. 7. Asthma. continue current  8. Anemia. Follow up Dr Tracy Martin  9. Colon adenomas. Fiber, colo 2027  10. Depression and anxiety. Continue current and follow up for psychotherapy      RTC 5/23    Above conditions discussed at length and patient vocalized understanding. All questions answered to patient satisfaction            ICD-10-CM ICD-9-CM    1. Goiter  E04.9 240.9 US THYROID/PARATHYROID/SOFT TISS      T4, FREE      TSH 3RD GENERATION      2. Edema, unspecified type  R60.9 782.3 furosemide (LASIX) 20 mg tablet      3. Anxiety  F41.9 300.00       4. Reactive depression  F32.9 300.4       5. Primary hypertension  I10 401.9       6. Dyslipidemia  E78.5 272.4 LIPID PANEL      7. Chronic anemia  D64.9 285.9       8. Type 2 diabetes mellitus without complication, without long-term current use of insulin ([facility])  D63.5 830.27 METABOLIC PANEL, COMPREHENSIVE      CBC W/O DIFF      MICROALBUMIN, UR, RAND W/ MICROALB/CREAT RATIO      HEMOGLOBIN A1C WITH EAG      9.  Hyperplastic colonic polyp, unspecified part of colon  K63.5 211.3

## 2023-01-27 ENCOUNTER — TELEPHONE (OUTPATIENT)
Dept: INTERNAL MEDICINE CLINIC | Age: 53
End: 2023-01-27

## 2023-01-27 NOTE — TELEPHONE ENCOUNTER
----- Message from [facility] sent at 1/26/2023  3:57 PM EST -----  Subject: Message to Provider    QUESTIONS  Information for Provider? pt just left appt, was told she needed an   Ultrasound and wants to know is getting scheduled or if she needs to   schedule it? please contact.   ---------------------------------------------------------------------------  --------------  Chris Alicea INFO  [phone]; OK to leave message on voicemail  ---------------------------------------------------------------------------  --------------  SCRIPT ANSWERS  Relationship to Patient?  Self

## 2023-02-05 DIAGNOSIS — E04.9 GOITER: Primary | ICD-10-CM

## 2023-02-05 DIAGNOSIS — E11.9 TYPE 2 DIABETES MELLITUS WITHOUT COMPLICATION, WITHOUT LONG-TERM CURRENT USE OF INSULIN (HCC): Primary | ICD-10-CM

## 2023-02-07 DIAGNOSIS — E78.5 DYSLIPIDEMIA: Primary | ICD-10-CM

## 2023-02-07 DIAGNOSIS — E11.9 TYPE 2 DIABETES MELLITUS WITHOUT COMPLICATION, WITHOUT LONG-TERM CURRENT USE OF INSULIN (HCC): Primary | ICD-10-CM

## 2023-02-09 ENCOUNTER — HOSPITAL ENCOUNTER (OUTPATIENT)
Dept: ULTRASOUND IMAGING | Age: 53
Discharge: HOME | End: 2023-02-09
Attending: INTERNAL MEDICINE
Payer: COMMERCIAL

## 2023-02-09 DIAGNOSIS — E04.9 GOITER: ICD-10-CM

## 2023-02-09 PROCEDURE — 76536 US EXAM OF HEAD AND NECK: CPT

## 2023-02-14 ENCOUNTER — TELEPHONE (OUTPATIENT)
Age: 53
End: 2023-02-14

## 2023-02-14 NOTE — TELEPHONE ENCOUNTER
----- Message from Pauline Gutierrez sent at 2/14/2023 10:48 AM EST -----  Subject: Results Request    QUESTIONS  Results: ULTRASOUND;  Ordered by: Lenord Canavan   Date Performed: 2023-02-09  ---------------------------------------------------------------------------  --------------  Shan MAI    [phone]; OK to leave message on voicemail  ---------------------------------------------------------------------------  --------------

## 2023-02-15 NOTE — TELEPHONE ENCOUNTER
Advised patient that results are still pending. We will notify her of the results as soon as they are available.

## 2023-02-28 DIAGNOSIS — E11.9 TYPE 2 DIABETES MELLITUS WITHOUT COMPLICATION, WITHOUT LONG-TERM CURRENT USE OF INSULIN (HCC): Primary | ICD-10-CM

## 2023-03-03 RX ORDER — ERGOCALCIFEROL 1.25 MG/1
CAPSULE ORAL
Qty: 4 CAPSULE | Refills: 0 | Status: SHIPPED | OUTPATIENT
Start: 2023-03-03

## 2023-03-03 RX ORDER — SPIRONOLACTONE 25 MG/1
TABLET ORAL
Qty: 90 TABLET | Refills: 0 | Status: SHIPPED | OUTPATIENT
Start: 2023-03-03

## 2023-03-03 RX ORDER — CITALOPRAM 40 MG/1
TABLET ORAL
Qty: 90 TABLET | Refills: 0 | Status: SHIPPED | OUTPATIENT
Start: 2023-03-03

## 2023-03-03 RX ORDER — TIRZEPATIDE 2.5 MG/.5ML
INJECTION, SOLUTION SUBCUTANEOUS
Qty: 4 ML | Refills: 0 | OUTPATIENT
Start: 2023-03-03

## 2023-03-14 ENCOUNTER — TELEPHONE (OUTPATIENT)
Age: 53
End: 2023-03-14

## 2023-03-14 NOTE — TELEPHONE ENCOUNTER
Patient wants to know if she can go back on B12 injection. Stating she is having some body aches and pain and energy level concerns.

## 2023-03-14 NOTE — TELEPHONE ENCOUNTER
Dont have her listed as being b12 def  Will not be covered unless she has dx    The last b12 level was nl 6/22

## 2023-03-26 DIAGNOSIS — E11.9 TYPE 2 DIABETES MELLITUS WITHOUT COMPLICATION, WITHOUT LONG-TERM CURRENT USE OF INSULIN (HCC): ICD-10-CM

## 2023-03-27 NOTE — TELEPHONE ENCOUNTER
PCP:  Angy Quiñones MD    Last appt: [unfilled]  Future Appointments   Date Time Provider Reji Francisco   5/31/2023  2:40 PM Karrie Osgood, MD [facility] BS AMB       Requested Prescriptions     Pending Prescriptions Disp Refills    MOUNJARO 5 MG/0.5ML SOPN SC injection [Pharmacy Med Name: Jese Pillar 5 MG/0.5ML Subcutaneous Solution Pen-injector] 4 mL 0     Sig: INJECT 0.5 ML SUBCUTANEOUSLY  ONCE A WEEK

## 2023-04-17 RX ORDER — ERGOCALCIFEROL 1.25 MG/1
CAPSULE ORAL
Qty: 4 CAPSULE | Refills: 0 | Status: SHIPPED | OUTPATIENT
Start: 2023-04-17

## 2023-04-25 DIAGNOSIS — E11.9 TYPE 2 DIABETES MELLITUS WITHOUT COMPLICATION, WITHOUT LONG-TERM CURRENT USE OF INSULIN (HCC): ICD-10-CM

## 2023-04-28 RX ORDER — ERGOCALCIFEROL 1.25 MG/1
CAPSULE ORAL
Qty: 4 CAPSULE | Refills: 0 | Status: SHIPPED | OUTPATIENT
Start: 2023-04-28

## 2023-05-02 RX ORDER — POTASSIUM CHLORIDE 20 MEQ/1
20 TABLET, EXTENDED RELEASE ORAL DAILY
Qty: 90 TABLET | Refills: 3 | Status: SHIPPED | OUTPATIENT
Start: 2023-05-02

## 2023-05-03 RX ORDER — POTASSIUM CHLORIDE 20 MEQ/1
TABLET, EXTENDED RELEASE ORAL
Qty: 90 TABLET | Refills: 0 | OUTPATIENT
Start: 2023-05-03

## 2023-05-07 ENCOUNTER — PATIENT MESSAGE (OUTPATIENT)
Age: 53
End: 2023-05-07

## 2023-05-07 DIAGNOSIS — R51.9 SCALP PAIN: Primary | ICD-10-CM

## 2023-05-08 RX ORDER — AMLODIPINE BESYLATE 5 MG/1
TABLET ORAL
Qty: 90 TABLET | Refills: 3 | Status: SHIPPED | OUTPATIENT
Start: 2023-05-08

## 2023-05-08 NOTE — TELEPHONE ENCOUNTER
From: Sebastien Constable  To: Dr. Shahzad Heard: 5/7/2023 12:16 AM EDT  Subject: Blood Sugar Readings     Hello    Ive been having some low blood sugar readings of 76 and some 80s. Martha Brink been having to take glucose tablets to bring my sugar up again. Krista noticed these low readings even hours after eating and definitely when I wake up. I have already stopped taking Actos as recommended when my last dose of Mounjaro was increased. Please advise.     Thank you  Rosie Hannah

## 2023-05-08 NOTE — TELEPHONE ENCOUNTER
From: Demond Su  To: Dr. Victoriano Webber: 5/7/2023 5:54 PM EDT  Subject: Scalp and skin pain    Hello Again Dr. Jamie Martinez,    I also meant to tell you about another issue that Momo Tirado been having for about 6-8 months with my scalp. It feels like its bleeding and on fire and is extremely sensitive to the touch. Ive had to stop going to the salon and it takes hours to wash, condition and comb or brush it. Initially I thought I was just overreacting but that isnt the case something is wrong and my skin hurts to the touch sometimes like my nerve endings are on fire as well. I really would like to get my hair done in the near future but I dont see how it would be possible now. I have told my  I would shave it bald. He let me know there can only be one bald person in the house. Lol. Please advise.      Thank you as always,  Monique Christian

## 2023-05-08 NOTE — TELEPHONE ENCOUNTER
M for patient to return my call. [employer] message sent:     Good Afternoon,     Dr. Niko Bah would like to know if you are having hypoglycemia symptoms when your blood sugar readings are in the 70/80s? Also, have you stopped taking glimepiride and actos? You should only be taking mounjaro and metformin.      Christopher Escobar, CMA

## 2023-05-08 NOTE — TELEPHONE ENCOUNTER
[employer] Message sent. Good Afternoon,      Dr. Anaid Julio is going to refer you to Dermatology for the issues with your scalp. Dr. Dorcas Rosas office should be contacting you to schedule an initial visit within 7-10 business days. Address is [address] [place] [address], [address]. Phone number [phone].

## 2023-05-08 NOTE — TELEPHONE ENCOUNTER
Is she symptomatic in the 70s/80s (hypoglycemia sx)?     Confirm she is off glimipiride and actos    Should be on mounjaro and metformin

## 2023-05-09 NOTE — TELEPHONE ENCOUNTER
[employer] message sent     Good Morning,     Per, Dr. Mikey Fernandez:     Decrease metformin to 1 tablet a day or stop altogether, if needed. If the symptoms persist, we may need to cut back the mounjaro dose. Please keep us advise of your symptoms.      Ryan Cee, TIM

## 2023-05-18 ENCOUNTER — HOSPITAL ENCOUNTER (OUTPATIENT)
Facility: HOSPITAL | Age: 53
Discharge: HOME | End: 2023-05-21

## 2023-05-18 LAB — SENTARA SPECIMEN COLLECTION: NORMAL

## 2023-05-19 LAB
A/G RATIO: 1.4 RATIO (ref 1.1–2.6)
ALBUMIN SERPL-MCNC: 4.2 G/DL (ref 3.5–5)
ALP BLD-CCNC: 82 U/L (ref 25–115)
ALT SERPL-CCNC: 18 U/L (ref 5–40)
ANION GAP SERPL CALCULATED.3IONS-SCNC: 11 MMOL/L (ref 3–15)
AST SERPL-CCNC: 15 U/L (ref 10–37)
BILIRUB SERPL-MCNC: 0.3 MG/DL (ref 0.2–1.2)
BUN BLDV-MCNC: 17 MG/DL (ref 6–22)
CALCIUM SERPL-MCNC: 10.1 MG/DL (ref 8.4–10.5)
CHLORIDE BLD-SCNC: 100 MMOL/L (ref 98–110)
CHOLESTEROL/HDL RATIO: 4 (ref 0–5)
CHOLESTEROL: 147 MG/DL (ref 110–200)
CO2: 28 MMOL/L (ref 20–32)
CREAT SERPL-MCNC: 0.7 MG/DL (ref 0.5–1.2)
CREATININE URINE: 183 MG/DL
GLOBULIN: 3.1 G/DL (ref 2–4)
GLOMERULAR FILTRATION RATE: >60 ML/MIN/1.73 SQ.M.
GLUCOSE: 116 MG/DL (ref 70–99)
HCT VFR BLD CALC: 35.6 % (ref 35.1–48)
HDLC SERPL-MCNC: 37 MG/DL
HEMOGLOBIN: 10.2 G/DL (ref 11.7–16)
LDL CHOLESTEROL CALCULATED: 92 MG/DL (ref 50–99)
LDL/HDL RATIO: 2.5
MCH RBC QN AUTO: 21 PG (ref 26–34)
MCHC RBC AUTO-ENTMCNC: 29 G/DL (ref 31–36)
MCV RBC AUTO: 74 FL (ref 80–99)
MICROALB/CREAT RATIO (UG/MG CREAT.): NORMAL
MICROALBUMIN/CREAT 24H UR: <12 MG/L (ref 0.1–17)
NON-HDL CHOLESTEROL: 110 MG/DL
PDW BLD-RTO: 16.7 % (ref 10–15.5)
PLATELET # BLD: 247 K/UL (ref 140–440)
PMV BLD AUTO: 11.2 FL (ref 9–13)
POTASSIUM SERPL-SCNC: 3.6 MMOL/L (ref 3.5–5.5)
RBC: 4.79 M/UL (ref 3.8–5.2)
SODIUM BLD-SCNC: 139 MMOL/L (ref 133–145)
T4 FREE: 1.1 NG/DL (ref 0.9–1.8)
TOTAL PROTEIN: 7.3 G/DL (ref 6.4–8.3)
TRIGL SERPL-MCNC: 89 MG/DL (ref 40–149)
TSH SERPL DL<=0.05 MIU/L-ACNC: 1.07 MCU/ML (ref 0.27–4.2)
VLDLC SERPL CALC-MCNC: 18 MG/DL (ref 8–30)
WBC: 3.1 K/UL (ref 4–11)

## 2023-05-29 DIAGNOSIS — E11.9 TYPE 2 DIABETES MELLITUS WITHOUT COMPLICATION, WITHOUT LONG-TERM CURRENT USE OF INSULIN (HCC): ICD-10-CM

## 2023-05-29 SDOH — ECONOMIC STABILITY: HOUSING INSECURITY
IN THE LAST 12 MONTHS, WAS THERE A TIME WHEN YOU DID NOT HAVE A STEADY PLACE TO SLEEP OR SLEPT IN A SHELTER (INCLUDING NOW)?: NO

## 2023-05-29 SDOH — ECONOMIC STABILITY: FOOD INSECURITY: WITHIN THE PAST 12 MONTHS, THE FOOD YOU BOUGHT JUST DIDN'T LAST AND YOU DIDN'T HAVE MONEY TO GET MORE.: NEVER TRUE

## 2023-05-29 SDOH — ECONOMIC STABILITY: FOOD INSECURITY: WITHIN THE PAST 12 MONTHS, YOU WORRIED THAT YOUR FOOD WOULD RUN OUT BEFORE YOU GOT MONEY TO BUY MORE.: NEVER TRUE

## 2023-05-29 SDOH — ECONOMIC STABILITY: INCOME INSECURITY: HOW HARD IS IT FOR YOU TO PAY FOR THE VERY BASICS LIKE FOOD, HOUSING, MEDICAL CARE, AND HEATING?: NOT HARD AT ALL

## 2023-05-29 SDOH — ECONOMIC STABILITY: TRANSPORTATION INSECURITY
IN THE PAST 12 MONTHS, HAS LACK OF TRANSPORTATION KEPT YOU FROM MEETINGS, WORK, OR FROM GETTING THINGS NEEDED FOR DAILY LIVING?: NO

## 2023-05-30 NOTE — PROGRESS NOTES
Wilma Mckeon presents today for   Chief Complaint   Patient presents with    Follow-up    Discuss Labs     5-18-23    Hyperlipidemia    Hypertension    Diabetes     1. \"Have you been to the ER, urgent care clinic since your last visit? Hospitalized since your last visit? \" no    2. \"Have you seen or consulted any other health care providers outside of the [address] [place] [address] since your last visit? \" no     3. For patients aged 39-70: Has the patient had a colonoscopy / FIT/ Cologuard? Yes - no Care Gap present      If the patient is female:    4. For patients aged 41-77: Has the patient had a mammogram within the past 2 years? Yes - no Care Gap present      5. For patients aged 21-65: Has the patient had a pap smear? Yes - no Care Gap present    Wilma Mckeon 1970 female who presents for lab orders following office visit. Order placed for Point of Care, Hemoglobin A1c,  per Verbal Order from Dr. Briana Michelle with read back. There were no reactions observed.     Glenn Gay, CMA

## 2023-05-31 ENCOUNTER — OFFICE VISIT (OUTPATIENT)
Age: 53
End: 2023-05-31
Payer: COMMERCIAL

## 2023-05-31 VITALS
HEART RATE: 81 BPM | OXYGEN SATURATION: 100 % | HEIGHT: 65 IN | TEMPERATURE: 97.1 F | DIASTOLIC BLOOD PRESSURE: 64 MMHG | SYSTOLIC BLOOD PRESSURE: 119 MMHG | WEIGHT: 233 LBS | BODY MASS INDEX: 38.82 KG/M2 | RESPIRATION RATE: 20 BRPM

## 2023-05-31 DIAGNOSIS — E66.01 SEVERE OBESITY (BMI 35.0-39.9) WITH COMORBIDITY (HCC): ICD-10-CM

## 2023-05-31 DIAGNOSIS — G89.29 CHRONIC BILATERAL LOW BACK PAIN WITH SCIATICA, SCIATICA LATERALITY UNSPECIFIED: ICD-10-CM

## 2023-05-31 DIAGNOSIS — F33.0 MILD EPISODE OF RECURRENT MAJOR DEPRESSIVE DISORDER (HCC): ICD-10-CM

## 2023-05-31 DIAGNOSIS — J45.20 MILD INTERMITTENT ASTHMA WITHOUT COMPLICATION: ICD-10-CM

## 2023-05-31 DIAGNOSIS — I10 PRIMARY HYPERTENSION: ICD-10-CM

## 2023-05-31 DIAGNOSIS — M54.40 CHRONIC BILATERAL LOW BACK PAIN WITH SCIATICA, SCIATICA LATERALITY UNSPECIFIED: ICD-10-CM

## 2023-05-31 DIAGNOSIS — E78.5 DYSLIPIDEMIA: ICD-10-CM

## 2023-05-31 DIAGNOSIS — E11.9 TYPE 2 DIABETES MELLITUS WITHOUT COMPLICATION, WITHOUT LONG-TERM CURRENT USE OF INSULIN (HCC): Primary | ICD-10-CM

## 2023-05-31 DIAGNOSIS — E04.2 MULTIPLE THYROID NODULES: ICD-10-CM

## 2023-05-31 DIAGNOSIS — D64.9 CHRONIC ANEMIA: ICD-10-CM

## 2023-05-31 PROCEDURE — 99214 OFFICE O/P EST MOD 30 MIN: CPT | Performed by: INTERNAL MEDICINE

## 2023-05-31 PROCEDURE — 3044F HG A1C LEVEL LT 7.0%: CPT | Performed by: INTERNAL MEDICINE

## 2023-05-31 PROCEDURE — 3074F SYST BP LT 130 MM HG: CPT | Performed by: INTERNAL MEDICINE

## 2023-05-31 PROCEDURE — 3078F DIAST BP <80 MM HG: CPT | Performed by: INTERNAL MEDICINE

## 2023-05-31 RX ORDER — FUROSEMIDE 20 MG/1
20 TABLET ORAL DAILY
Qty: 90 TABLET | Refills: 3
Start: 2023-05-31

## 2023-05-31 RX ORDER — METFORMIN HYDROCHLORIDE 500 MG/1
TABLET, EXTENDED RELEASE ORAL
Qty: 180 TABLET | Refills: 3
Start: 2023-05-31

## 2023-05-31 ASSESSMENT — PATIENT HEALTH QUESTIONNAIRE - PHQ9
7. TROUBLE CONCENTRATING ON THINGS, SUCH AS READING THE NEWSPAPER OR WATCHING TELEVISION: 0
5. POOR APPETITE OR OVEREATING: 0
4. FEELING TIRED OR HAVING LITTLE ENERGY: 0
SUM OF ALL RESPONSES TO PHQ QUESTIONS 1-9: 0
SUM OF ALL RESPONSES TO PHQ QUESTIONS 1-9: 0
SUM OF ALL RESPONSES TO PHQ9 QUESTIONS 1 & 2: 0
2. FEELING DOWN, DEPRESSED OR HOPELESS: 0
1. LITTLE INTEREST OR PLEASURE IN DOING THINGS: 0
8. MOVING OR SPEAKING SO SLOWLY THAT OTHER PEOPLE COULD HAVE NOTICED. OR THE OPPOSITE, BEING SO FIGETY OR RESTLESS THAT YOU HAVE BEEN MOVING AROUND A LOT MORE THAN USUAL: 0
SUM OF ALL RESPONSES TO PHQ QUESTIONS 1-9: 0
3. TROUBLE FALLING OR STAYING ASLEEP: 0
SUM OF ALL RESPONSES TO PHQ QUESTIONS 1-9: 0
6. FEELING BAD ABOUT YOURSELF - OR THAT YOU ARE A FAILURE OR HAVE LET YOURSELF OR YOUR FAMILY DOWN: 0
10. IF YOU CHECKED OFF ANY PROBLEMS, HOW DIFFICULT HAVE THESE PROBLEMS MADE IT FOR YOU TO DO YOUR WORK, TAKE CARE OF THINGS AT HOME, OR GET ALONG WITH OTHER PEOPLE: 0
9. THOUGHTS THAT YOU WOULD BE BETTER OFF DEAD, OR OF HURTING YOURSELF: 0

## 2023-06-01 RX ORDER — ERGOCALCIFEROL 1.25 MG/1
CAPSULE ORAL
Qty: 4 CAPSULE | Refills: 5 | Status: SHIPPED | OUTPATIENT
Start: 2023-06-01

## 2023-06-01 RX ORDER — PIOGLITAZONEHYDROCHLORIDE 15 MG/1
TABLET ORAL
Qty: 90 TABLET | Refills: 0 | OUTPATIENT
Start: 2023-06-01

## 2023-06-01 RX ORDER — TIRZEPATIDE 10 MG/.5ML
INJECTION, SOLUTION SUBCUTANEOUS
Qty: 4 ML | Refills: 0 | OUTPATIENT
Start: 2023-06-01

## 2023-07-17 NOTE — TELEPHONE ENCOUNTER
Previous refill per chart    atorvastatin (LIPITOR) 80 MG tablet   6/14/2022     Sig - Route:  Take 1 tablet by mouth daily - Oral    Class: Historical Med

## 2023-07-18 RX ORDER — ATORVASTATIN CALCIUM 80 MG/1
TABLET, FILM COATED ORAL
Qty: 90 TABLET | Refills: 3 | Status: SHIPPED | OUTPATIENT
Start: 2023-07-18

## 2023-07-24 SDOH — HEALTH STABILITY: PHYSICAL HEALTH: ON AVERAGE, HOW MANY MINUTES DO YOU ENGAGE IN EXERCISE AT THIS LEVEL?: PATIENT DECLINED

## 2023-07-24 SDOH — HEALTH STABILITY: PHYSICAL HEALTH: ON AVERAGE, HOW MANY DAYS PER WEEK DO YOU ENGAGE IN MODERATE TO STRENUOUS EXERCISE (LIKE A BRISK WALK)?: 0 DAYS

## 2023-07-24 ASSESSMENT — SOCIAL DETERMINANTS OF HEALTH (SDOH)

## 2023-07-26 ENCOUNTER — OFFICE VISIT (OUTPATIENT)
Age: 53
End: 2023-07-26
Payer: COMMERCIAL

## 2023-07-26 VITALS
WEIGHT: 225 LBS | HEIGHT: 65 IN | DIASTOLIC BLOOD PRESSURE: 86 MMHG | HEART RATE: 84 BPM | BODY MASS INDEX: 37.49 KG/M2 | OXYGEN SATURATION: 99 % | TEMPERATURE: 98.1 F | SYSTOLIC BLOOD PRESSURE: 139 MMHG

## 2023-07-26 DIAGNOSIS — M25.511 BILATERAL SHOULDER PAIN, UNSPECIFIED CHRONICITY: ICD-10-CM

## 2023-07-26 DIAGNOSIS — E78.5 DYSLIPIDEMIA: ICD-10-CM

## 2023-07-26 DIAGNOSIS — M54.50 LUMBAR PAIN: ICD-10-CM

## 2023-07-26 DIAGNOSIS — M48.02 CERVICAL SPINAL STENOSIS: ICD-10-CM

## 2023-07-26 DIAGNOSIS — M79.18 MYOFASCIAL PAIN: ICD-10-CM

## 2023-07-26 DIAGNOSIS — F40.240 CLAUSTROPHOBIA: ICD-10-CM

## 2023-07-26 DIAGNOSIS — E04.9 GOITER: ICD-10-CM

## 2023-07-26 DIAGNOSIS — M54.2 CERVICAL PAIN: ICD-10-CM

## 2023-07-26 DIAGNOSIS — M48.062 SPINAL STENOSIS OF LUMBAR REGION WITH NEUROGENIC CLAUDICATION: Primary | ICD-10-CM

## 2023-07-26 DIAGNOSIS — M25.512 BILATERAL SHOULDER PAIN, UNSPECIFIED CHRONICITY: ICD-10-CM

## 2023-07-26 DIAGNOSIS — E11.9 TYPE 2 DIABETES MELLITUS WITHOUT COMPLICATION, WITHOUT LONG-TERM CURRENT USE OF INSULIN (HCC): ICD-10-CM

## 2023-07-26 PROCEDURE — 3079F DIAST BP 80-89 MM HG: CPT | Performed by: PHYSICAL MEDICINE & REHABILITATION

## 2023-07-26 PROCEDURE — 72100 X-RAY EXAM L-S SPINE 2/3 VWS: CPT | Performed by: PHYSICAL MEDICINE & REHABILITATION

## 2023-07-26 PROCEDURE — 99214 OFFICE O/P EST MOD 30 MIN: CPT | Performed by: PHYSICAL MEDICINE & REHABILITATION

## 2023-07-26 PROCEDURE — 3075F SYST BP GE 130 - 139MM HG: CPT | Performed by: PHYSICAL MEDICINE & REHABILITATION

## 2023-07-26 RX ORDER — LORATADINE 10 MG/1
10 TABLET ORAL DAILY
COMMUNITY

## 2023-07-26 RX ORDER — DIAZEPAM 5 MG/1
TABLET ORAL
Qty: 2 TABLET | Refills: 0 | Status: SHIPPED | OUTPATIENT
Start: 2023-07-26 | End: 2023-08-29

## 2023-07-26 RX ORDER — CHLORTHALIDONE 25 MG/1
25 TABLET ORAL DAILY
COMMUNITY
Start: 2023-07-15

## 2023-07-26 ASSESSMENT — ENCOUNTER SYMPTOMS
WHEEZING: 0
VOMITING: 0
NAUSEA: 0
TROUBLE SWALLOWING: 0
SHORTNESS OF BREATH: 0
BACK PAIN: 1

## 2023-07-26 NOTE — PROGRESS NOTES
Ashley Bales presents today for   Chief Complaint   Patient presents with    Back Pain    Leg Pain     Bilateral          Is someone accompanying this pt? no    Is the patient using any DME equipment during OV? no    Depression Screening:  PHQ-9 Questionaire 5/31/2023 1/26/2023 7/22/2022   Little interest or pleasure in doing things 0 0 0   Feeling down, depressed, or hopeless 0 0 0   Trouble falling or staying asleep, or sleeping too much 0 0 -   Feeling tired or having little energy 0 0 -   Poor appetite or overeating 0 0 -   Feeling bad about yourself - or that you are a failure or have let yourself or your family down 0 0 -   Trouble concentrating on things, such as reading the newspaper or watching television 0 0 -   Moving or speaking so slowly that other people could have noticed. Or the opposite - being so fidgety or restless that you have been moving around a lot more than usual 0 0 -   Thoughts that you would be better off dead, or of hurting yourself in some way 0 - -   PHQ-9 Total Score 0 0 0   If you checked off any problems, how difficult have these problems made it for you to do your work, take care of things at home, or get along with other people? 0 - -     PHQ Scores 5/31/2023 1/26/2023 7/22/2022 12/14/2021   PHQ2 Score 0 0 0 -   PHQ2 Score - - - 0   PHQ9 Score 0 0 0 -       Coordination of Care:  1. Have you been to the ER, urgent care clinic since your last visit? no  Hospitalized since your last visit? no    2. Have you seen or consulted any other health care providers outside of the [address] [place] [address] since your last visit? Yes, pcp  Include any pap smears or colon screening.  no

## 2023-07-26 NOTE — PROGRESS NOTES
[facility]  [address], [address] [place] [address] Dr  Phone: [phone]  Fax: [phone]        Erickson Mcqueen  : 1970  PCP: Rony Michael MD  2023    PROGRESS NOTE    2020  Seen as  a new patient 2020 with c/o low back pain radiating into the posterior aspect of the RLE since 2019. Her symptoms began when she was at [facility] and they radiated all the way to her foot. Later, her pain only radiated to the foot when  it was flared up. Note from Dr. Mag Fofana dated 19 indicating patient was seen with c/o 'sciatica' on the right side. PREDNISONE not tolerated well as medication 'made her crazy' with jitteriness, nervousness, insomnia, palpitations, and increase in blood sugars. PT (2020; [employer]) attended  Pt noted that she had a new pain in her left  thigh following dancing at a wedding and party, but it began to improve. Pt noted that she saw some improvement of the RLE paraesthesia with the increased dose of GABAPENTIN , but it caused somnolence so she was unable to take it during the day  Lumbar spine MRI  dated 2020 reviewed. Per report, Moderate spinal canal and mild foraminal stenoses at L5-S1. Mild spinal canal stenosis at L4-5. HISTORY OF PRESENT ILLNESS  Stacia Lewis is a 46 y.o. female c/o continued lower back pain. She saw NP Ender on 22 for eval of progressively worsening lower back pain with right buttock pain that radiates down to her foot, where they had ordered an L3-4 RICKY for pt and Lyrica 75 mg BID was rx'd. Pt notes that she did not proceed with injection. Pt also notes of pain of upper back and bilateral shoulder (L>R). She has hx of DM. Lumbosacral XR images dated 23 were reviewed. Upon my read, normal alignment. No obvious pelvic obliquity. L5-S1 disc space narrowing. No significant listhesis. No obvious compression deformities. Otherwise, unremarkable.

## 2023-07-27 DIAGNOSIS — E11.9 TYPE 2 DIABETES MELLITUS WITHOUT COMPLICATION, WITHOUT LONG-TERM CURRENT USE OF INSULIN (HCC): ICD-10-CM

## 2023-07-28 DIAGNOSIS — E04.9 GOITER: ICD-10-CM

## 2023-08-11 ENCOUNTER — HOSPITAL ENCOUNTER (OUTPATIENT)
Facility: HOSPITAL | Age: 53
Discharge: HOME | End: 2023-08-11
Attending: PHYSICAL MEDICINE & REHABILITATION
Payer: COMMERCIAL

## 2023-08-11 DIAGNOSIS — M48.02 CERVICAL SPINAL STENOSIS: ICD-10-CM

## 2023-08-11 DIAGNOSIS — M25.511 BILATERAL SHOULDER PAIN, UNSPECIFIED CHRONICITY: ICD-10-CM

## 2023-08-11 DIAGNOSIS — M54.2 CERVICAL PAIN: ICD-10-CM

## 2023-08-11 DIAGNOSIS — M25.512 BILATERAL SHOULDER PAIN, UNSPECIFIED CHRONICITY: ICD-10-CM

## 2023-08-11 PROCEDURE — 72141 MRI NECK SPINE W/O DYE: CPT

## 2023-08-15 ENCOUNTER — HOSPITAL ENCOUNTER (OUTPATIENT)
Facility: HOSPITAL | Age: 53
Discharge: HOME | End: 2023-08-18
Attending: INTERNAL MEDICINE
Payer: COMMERCIAL

## 2023-08-15 DIAGNOSIS — Z12.31 VISIT FOR SCREENING MAMMOGRAM: ICD-10-CM

## 2023-08-15 PROCEDURE — 77063 BREAST TOMOSYNTHESIS BI: CPT

## 2023-08-22 RX ORDER — DIAZEPAM 5 MG/1
10 TABLET ORAL ONCE
Status: DISCONTINUED | OUTPATIENT
Start: 2023-08-24 | End: 2023-08-22

## 2023-08-22 RX ORDER — DIAZEPAM 5 MG/1
2.5 TABLET ORAL ONCE
Status: DISCONTINUED | OUTPATIENT
Start: 2023-08-24 | End: 2023-08-22

## 2023-08-22 RX ORDER — DIAZEPAM 5 MG/1
5 TABLET ORAL ONCE
Status: DISCONTINUED | OUTPATIENT
Start: 2023-08-24 | End: 2023-08-22

## 2023-08-22 RX ORDER — DEXAMETHASONE SODIUM PHOSPHATE 10 MG/ML
10 INJECTION, SOLUTION INTRAMUSCULAR; INTRAVENOUS ONCE
Status: DISCONTINUED | OUTPATIENT
Start: 2023-08-24 | End: 2023-08-22

## 2023-08-22 RX ORDER — LIDOCAINE HYDROCHLORIDE 10 MG/ML
30 INJECTION, SOLUTION EPIDURAL; INFILTRATION; INTRACAUDAL; PERINEURAL ONCE
Status: DISCONTINUED | OUTPATIENT
Start: 2023-08-24 | End: 2023-08-22

## 2023-08-24 ENCOUNTER — HOSPITAL ENCOUNTER (OUTPATIENT)
Facility: HOSPITAL | Age: 53
Discharge: HOME | End: 2023-08-24
Payer: COMMERCIAL

## 2023-08-24 ENCOUNTER — HOSPITAL ENCOUNTER (OUTPATIENT)
Facility: HOSPITAL | Age: 53
End: 2023-08-24
Payer: COMMERCIAL

## 2023-08-24 ENCOUNTER — TELEPHONE (OUTPATIENT)
Age: 53
End: 2023-08-24

## 2023-08-24 VITALS
DIASTOLIC BLOOD PRESSURE: 94 MMHG | TEMPERATURE: 98 F | OXYGEN SATURATION: 96 % | SYSTOLIC BLOOD PRESSURE: 132 MMHG | RESPIRATION RATE: 16 BRPM | HEART RATE: 86 BPM

## 2023-08-24 DIAGNOSIS — E11.9 TYPE 2 DIABETES MELLITUS WITHOUT COMPLICATION, WITHOUT LONG-TERM CURRENT USE OF INSULIN (HCC): Primary | ICD-10-CM

## 2023-08-24 DIAGNOSIS — Z01.818 PREOP TESTING: ICD-10-CM

## 2023-08-24 PROBLEM — M48.061 SPINAL STENOSIS, LUMBAR REGION, WITHOUT NEUROGENIC CLAUDICATION: Status: ACTIVE | Noted: 2023-08-24

## 2023-08-24 LAB
GLUCOSE BLD STRIP.AUTO-MCNC: 110 MG/DL (ref 70–110)
HCG UR QL: NEGATIVE

## 2023-08-24 PROCEDURE — 6360000002 HC RX W HCPCS: Performed by: PHYSICAL MEDICINE & REHABILITATION

## 2023-08-24 PROCEDURE — 81025 URINE PREGNANCY TEST: CPT

## 2023-08-24 PROCEDURE — 82962 GLUCOSE BLOOD TEST: CPT

## 2023-08-24 PROCEDURE — 62323 NJX INTERLAMINAR LMBR/SAC: CPT | Performed by: PHYSICAL MEDICINE & REHABILITATION

## 2023-08-24 PROCEDURE — 62323 NJX INTERLAMINAR LMBR/SAC: CPT

## 2023-08-24 PROCEDURE — 2500000003 HC RX 250 WO HCPCS: Performed by: PHYSICAL MEDICINE & REHABILITATION

## 2023-08-24 PROCEDURE — 6370000000 HC RX 637 (ALT 250 FOR IP): Performed by: PHYSICAL MEDICINE & REHABILITATION

## 2023-08-24 RX ORDER — DIAZEPAM 5 MG/1
5 TABLET ORAL ONCE
Status: COMPLETED | OUTPATIENT
Start: 2023-08-24 | End: 2023-08-24

## 2023-08-24 RX ORDER — DEXAMETHASONE SODIUM PHOSPHATE 10 MG/ML
10 INJECTION, SOLUTION INTRAMUSCULAR; INTRAVENOUS ONCE
Status: COMPLETED | OUTPATIENT
Start: 2023-08-24 | End: 2023-08-24

## 2023-08-24 RX ORDER — ACETAMINOPHEN 325 MG/1
325 TABLET ORAL EVERY 6 HOURS PRN
COMMUNITY

## 2023-08-24 RX ORDER — IBUPROFEN 200 MG
200 TABLET ORAL EVERY 6 HOURS PRN
COMMUNITY

## 2023-08-24 RX ORDER — DIAZEPAM 5 MG/1
10 TABLET ORAL ONCE
Status: COMPLETED | OUTPATIENT
Start: 2023-08-24 | End: 2023-08-24

## 2023-08-24 RX ORDER — LIDOCAINE HYDROCHLORIDE 10 MG/ML
30 INJECTION, SOLUTION EPIDURAL; INFILTRATION; INTRACAUDAL; PERINEURAL ONCE
Status: COMPLETED | OUTPATIENT
Start: 2023-08-24 | End: 2023-08-24

## 2023-08-24 RX ORDER — DIAZEPAM 5 MG/1
2.5 TABLET ORAL ONCE
Status: COMPLETED | OUTPATIENT
Start: 2023-08-24 | End: 2023-08-24

## 2023-08-24 RX ADMIN — LIDOCAINE HYDROCHLORIDE 12 ML: 10 INJECTION, SOLUTION EPIDURAL; INFILTRATION; INTRACAUDAL; PERINEURAL at 13:28

## 2023-08-24 RX ADMIN — DIAZEPAM 10 MG: 5 TABLET ORAL at 12:29

## 2023-08-24 RX ADMIN — DEXAMETHASONE SODIUM PHOSPHATE 10 MG: 10 INJECTION, SOLUTION INTRAMUSCULAR; INTRAVENOUS at 13:30

## 2023-08-24 ASSESSMENT — PAIN SCALES - GENERAL: PAINLEVEL_OUTOF10: 2

## 2023-08-24 ASSESSMENT — PAIN - FUNCTIONAL ASSESSMENT: PAIN_FUNCTIONAL_ASSESSMENT: 0-10

## 2023-08-24 NOTE — TELEPHONE ENCOUNTER
Pt request new refill auth for [facility] said she has completed the 10 MG  dosing and it's time for the next step up dose.     Request Mounjaro 12.5 MG    Send to [facility]

## 2023-08-24 NOTE — OP NOTE
Intralaminar Epidural Steroid Block Procedure Note      Patient Name: Rafy Mott    Date of Procedure: August 24, 2023    Preoperative Diagnosis: M48.061    Post Operative Diagnosis: Same    Procedure: L3-L4 Epidural Block     PROCEDURE:  Lumbar Epidural Block    Consent:  Informed  Consent was obtained prior to the procedure. The patient was given the opportunity to ask questions regarding the procedure and its associated risks. In addition to the potential risks associated with the procedure itself, the patient was informed both verbally and in writing of potential side effects of the use glucocorticoids. The patient appeared to comprehend the informed consent and desired to have the procedure performed. The patient was placed in the prone position on the fluoroscopy table and the back prepped and draped in the usual sterile manner. The interlaminar space was identified using fluoroscopy and the skin anesthetized with 1% Lidocaine. A #18 Tuohy Epidural needle was advanced under fluoroscopic control from a paramedian approach to the  epidural space as noted above, using the loss of resistance to fluid technique. Then, 10 mg of preservative free Dexamethasone and 2 cc of Lidocaine was slowly injected. The patient tolerated the procedure well. The injection area was cleaned and band aids applied. No excessive bleeding was noted. Patient dressed and was discharged to home with instructions.

## 2023-08-25 ENCOUNTER — TELEPHONE (OUTPATIENT)
Age: 53
End: 2023-08-25

## 2023-08-25 DIAGNOSIS — E11.9 TYPE 2 DIABETES MELLITUS WITHOUT COMPLICATION, WITHOUT LONG-TERM CURRENT USE OF INSULIN (HCC): ICD-10-CM

## 2023-08-26 ENCOUNTER — TELEPHONE (OUTPATIENT)
Age: 53
End: 2023-08-26

## 2023-08-26 NOTE — TELEPHONE ENCOUNTER
On-call message 8/25/2023 5:30 PM    Patient called to ask me to do an urgent prior Auth for her. She recently received an epidural injection, is out of her Abi Addison, and is concerned about hyperglycemia. Her glucose was 161 when she spoke to me, before eating her evening meal.    I told her that I was unable to do an urgent prior Auth after hours. She takes two 500 mg metformin daily, I suggested she take 3 daily today, tomorrow, and Sunday, and to let me know over the weekend if she had any problems. She asked me for my direct cell number so she could bypass the answering service in case of questions, and I told her to contact me through the answering service. She did not seem happy with that.

## 2023-08-28 ENCOUNTER — TELEPHONE (OUTPATIENT)
Age: 53
End: 2023-08-28

## 2023-08-29 RX ORDER — TIRZEPATIDE 10 MG/.5ML
INJECTION, SOLUTION SUBCUTANEOUS
Qty: 4 ML | Refills: 0 | OUTPATIENT
Start: 2023-08-29

## 2023-09-13 ENCOUNTER — TELEPHONE (OUTPATIENT)
Age: 53
End: 2023-09-13

## 2023-09-13 DIAGNOSIS — R05.9 COUGH, UNSPECIFIED TYPE: Primary | ICD-10-CM

## 2023-09-13 DIAGNOSIS — E11.9 TYPE 2 DIABETES MELLITUS WITHOUT COMPLICATION, WITHOUT LONG-TERM CURRENT USE OF INSULIN (HCC): ICD-10-CM

## 2023-09-13 RX ORDER — BENZONATATE 200 MG/1
200 CAPSULE ORAL 3 TIMES DAILY PRN
Qty: 30 CAPSULE | Refills: 2 | Status: SHIPPED | OUTPATIENT
Start: 2023-09-13

## 2023-09-17 DIAGNOSIS — E11.9 TYPE 2 DIABETES MELLITUS WITHOUT COMPLICATION, WITHOUT LONG-TERM CURRENT USE OF INSULIN (HCC): ICD-10-CM

## 2023-09-19 ENCOUNTER — TELEPHONE (OUTPATIENT)
Age: 53
End: 2023-09-19

## 2023-09-19 NOTE — TELEPHONE ENCOUNTER
PCP:  Nima Raphael MD    Last refill per chart:   Tirzepatide (MOUNJARO) 12.5 MG/0.5ML SOPN SC injection 12.5 mg, SubCUTAneous, WEEKLY EditCancel Reorder      Summary: Inject 0.5 mLs into the skin once a week, Disp-4 Adjustable Dose Pre-filled Pen Syringe, R-0  Normal   Dose, Frequency: 12.5 mg, WEEKLY  Start: 8/24/2023  Ord/Sold: 8/24/2023 (O)  Report  Taking:   Long-term:   [facility] [address] [place], [place] [address] [address] [place] [address] [phone] - F [phone]  Med Dose History       Patient Sig: Inject 0.5 mLs into the skin once a week       Ordered on: 8/24/2023       Authorized by: Nima Raphael       Dispense: 4 Adjustable Dose Pre-filled Pen Syringe       Refills: 0 ordered          Future Appointments   Date Time Provider [address] [place] [address]   10/17/2023 12:00 PM Jade Kern MD [facility] BS AMB   12/6/2023  2:40 PM Nima Raphael MD [facility] BS AMB

## 2023-09-19 NOTE — TELEPHONE ENCOUNTER
Is this her typical lbp?   She has been seeing Dr Dorisann Duane - would call him for recs as he saw her in July

## 2023-09-19 NOTE — TELEPHONE ENCOUNTER
Patient called and states that she has had lower back pain for about 3 days. She did have nausea the first day but not anymore, just the back pain. No other symptoms to report at this time. She is calling to see what she should do and can be reached at [phone]. Pharmacy: [facility] on [address]     No appts available, please advise, thank you.

## 2023-09-20 RX ORDER — TIRZEPATIDE 12.5 MG/.5ML
INJECTION, SOLUTION SUBCUTANEOUS
Qty: 4 ML | Refills: 0 | OUTPATIENT
Start: 2023-09-20

## 2023-10-19 NOTE — TELEPHONE ENCOUNTER
Pt was referred for a sleep study with Neurology Dr Neeru Abdalla , they are asking for the referral to be faxed over along with the pt last office notes I personally spent

## 2023-11-15 RX ORDER — ERGOCALCIFEROL 1.25 MG/1
CAPSULE ORAL
Qty: 4 CAPSULE | Refills: 5 | Status: SHIPPED | OUTPATIENT
Start: 2023-11-15

## 2023-11-30 DIAGNOSIS — E11.9 TYPE 2 DIABETES MELLITUS WITHOUT COMPLICATION, WITHOUT LONG-TERM CURRENT USE OF INSULIN (HCC): ICD-10-CM

## 2023-12-06 ENCOUNTER — PATIENT MESSAGE (OUTPATIENT)
Age: 53
End: 2023-12-06

## 2023-12-06 DIAGNOSIS — I10 PRIMARY HYPERTENSION: Primary | ICD-10-CM

## 2023-12-06 NOTE — TELEPHONE ENCOUNTER
Last OV: 5/31/2023  Next OV: 12/12/2023  Last refill: 10/17/2023 -- Candesartan  7/15/2023 -- Chlorthalidone

## 2023-12-08 ENCOUNTER — HOSPITAL ENCOUNTER (OUTPATIENT)
Facility: HOSPITAL | Age: 53
Discharge: HOME | End: 2023-12-11

## 2023-12-08 LAB — SENTARA SPECIMEN COLLECTION: NORMAL

## 2023-12-08 RX ORDER — CHLORTHALIDONE 25 MG/1
25 TABLET ORAL DAILY
Qty: 90 TABLET | Refills: 1 | Status: SHIPPED | OUTPATIENT
Start: 2023-12-08

## 2023-12-08 RX ORDER — CANDESARTAN 16 MG/1
16 TABLET ORAL DAILY
Qty: 90 TABLET | Refills: 1 | Status: SHIPPED | OUTPATIENT
Start: 2023-12-08

## 2023-12-08 NOTE — TELEPHONE ENCOUNTER
From: Massiel Rutledge  To: Dr. Chris Cruz: 12/6/2023 11:09 AM EST  Subject: Refills    I am unable to submit my medication requests for Candesartan 16mg and Chlorthalidone 25mg on the yelitza. I am out of both. I have an upcoming appointment on Dec. 12, 2023.

## 2023-12-09 LAB
ANION GAP SERPL CALCULATED.3IONS-SCNC: 10 MMOL/L (ref 3–15)
BUN BLDV-MCNC: 15 MG/DL (ref 6–22)
CALCIUM SERPL-MCNC: 10.1 MG/DL (ref 8.4–10.5)
CHLORIDE BLD-SCNC: 100 MMOL/L (ref 98–110)
CO2: 27 MMOL/L (ref 20–32)
CREAT SERPL-MCNC: 0.6 MG/DL (ref 0.5–1.2)
GLOMERULAR FILTRATION RATE: >60 ML/MIN/1.73 SQ.M.
GLUCOSE: 106 MG/DL (ref 70–99)
POTASSIUM SERPL-SCNC: 3.8 MMOL/L (ref 3.5–5.5)
SODIUM BLD-SCNC: 137 MMOL/L (ref 133–145)

## 2023-12-11 RX ORDER — CHLORTHALIDONE 25 MG/1
25 TABLET ORAL DAILY
Qty: 90 TABLET | Refills: 2 | OUTPATIENT
Start: 2023-12-11

## 2023-12-11 RX ORDER — CANDESARTAN 16 MG/1
TABLET ORAL
Qty: 90 TABLET | Refills: 2 | OUTPATIENT
Start: 2023-12-11

## 2023-12-12 ENCOUNTER — OFFICE VISIT (OUTPATIENT)
Age: 53
End: 2023-12-12
Payer: COMMERCIAL

## 2023-12-12 VITALS
OXYGEN SATURATION: 100 % | HEART RATE: 90 BPM | TEMPERATURE: 97 F | SYSTOLIC BLOOD PRESSURE: 124 MMHG | WEIGHT: 213 LBS | HEIGHT: 65 IN | BODY MASS INDEX: 35.49 KG/M2 | DIASTOLIC BLOOD PRESSURE: 86 MMHG | RESPIRATION RATE: 16 BRPM

## 2023-12-12 DIAGNOSIS — E11.9 TYPE 2 DIABETES MELLITUS WITHOUT COMPLICATION, WITHOUT LONG-TERM CURRENT USE OF INSULIN (HCC): Primary | ICD-10-CM

## 2023-12-12 DIAGNOSIS — I10 PRIMARY HYPERTENSION: ICD-10-CM

## 2023-12-12 DIAGNOSIS — G47.33 OSA (OBSTRUCTIVE SLEEP APNEA): ICD-10-CM

## 2023-12-12 DIAGNOSIS — E78.5 DYSLIPIDEMIA: ICD-10-CM

## 2023-12-12 DIAGNOSIS — K63.5 POLYP OF COLON, UNSPECIFIED PART OF COLON, UNSPECIFIED TYPE: ICD-10-CM

## 2023-12-12 DIAGNOSIS — L29.9 SKIN PRURITUS: ICD-10-CM

## 2023-12-12 LAB — HBA1C MFR BLD: 5.9 %

## 2023-12-12 PROCEDURE — 3044F HG A1C LEVEL LT 7.0%: CPT | Performed by: INTERNAL MEDICINE

## 2023-12-12 PROCEDURE — 3078F DIAST BP <80 MM HG: CPT | Performed by: INTERNAL MEDICINE

## 2023-12-12 PROCEDURE — 3074F SYST BP LT 130 MM HG: CPT | Performed by: INTERNAL MEDICINE

## 2023-12-12 PROCEDURE — 83036 HEMOGLOBIN GLYCOSYLATED A1C: CPT | Performed by: INTERNAL MEDICINE

## 2023-12-12 PROCEDURE — 99214 OFFICE O/P EST MOD 30 MIN: CPT | Performed by: INTERNAL MEDICINE

## 2023-12-12 SDOH — ECONOMIC STABILITY: INCOME INSECURITY: HOW HARD IS IT FOR YOU TO PAY FOR THE VERY BASICS LIKE FOOD, HOUSING, MEDICAL CARE, AND HEATING?: NOT HARD AT ALL

## 2023-12-12 SDOH — ECONOMIC STABILITY: FOOD INSECURITY: WITHIN THE PAST 12 MONTHS, YOU WORRIED THAT YOUR FOOD WOULD RUN OUT BEFORE YOU GOT MONEY TO BUY MORE.: NEVER TRUE

## 2023-12-12 SDOH — ECONOMIC STABILITY: FOOD INSECURITY: WITHIN THE PAST 12 MONTHS, THE FOOD YOU BOUGHT JUST DIDN'T LAST AND YOU DIDN'T HAVE MONEY TO GET MORE.: NEVER TRUE

## 2023-12-12 ASSESSMENT — PATIENT HEALTH QUESTIONNAIRE - PHQ9
SUM OF ALL RESPONSES TO PHQ QUESTIONS 1-9: 8
SUM OF ALL RESPONSES TO PHQ9 QUESTIONS 1 & 2: 2
9. THOUGHTS THAT YOU WOULD BE BETTER OFF DEAD, OR OF HURTING YOURSELF: 0
SUM OF ALL RESPONSES TO PHQ QUESTIONS 1-9: 8
7. TROUBLE CONCENTRATING ON THINGS, SUCH AS READING THE NEWSPAPER OR WATCHING TELEVISION: 1
5. POOR APPETITE OR OVEREATING: 1
1. LITTLE INTEREST OR PLEASURE IN DOING THINGS: 1
6. FEELING BAD ABOUT YOURSELF - OR THAT YOU ARE A FAILURE OR HAVE LET YOURSELF OR YOUR FAMILY DOWN: 1
SUM OF ALL RESPONSES TO PHQ QUESTIONS 1-9: 8
4. FEELING TIRED OR HAVING LITTLE ENERGY: 1
8. MOVING OR SPEAKING SO SLOWLY THAT OTHER PEOPLE COULD HAVE NOTICED. OR THE OPPOSITE, BEING SO FIGETY OR RESTLESS THAT YOU HAVE BEEN MOVING AROUND A LOT MORE THAN USUAL: 1
10. IF YOU CHECKED OFF ANY PROBLEMS, HOW DIFFICULT HAVE THESE PROBLEMS MADE IT FOR YOU TO DO YOUR WORK, TAKE CARE OF THINGS AT HOME, OR GET ALONG WITH OTHER PEOPLE: 1
3. TROUBLE FALLING OR STAYING ASLEEP: 1
SUM OF ALL RESPONSES TO PHQ QUESTIONS 1-9: 8
2. FEELING DOWN, DEPRESSED OR HOPELESS: 1

## 2023-12-12 NOTE — PROGRESS NOTES
1. \"Have you been to the ER, urgent care clinic since your last visit? Hospitalized since your last visit? \" No    2. \"Have you seen or consulted any other health care providers outside of the [address] [place] [address] since your last visit? \" No     3. For patients aged 43-73: Has the patient had a colonoscopy / FIT/ Cologuard? Yes - Care Gap present. Most recent result on file      If the patient is female:    4. For patients aged 43-66: Has the patient had a mammogram within the past 2 years? Yes - Care Gap present. Most recent result on file      5. For patients aged 21-65: Has the patient had a pap smear? Yes - Care Gap present.  Most recent result on file
Metabolic Panel    HEMOGLOBIN A1C W/O EAG    Lipid Panel    Microalbumin / Creatinine Urine Ratio      2. Skin pruritus  External Referral To Dermatology      3. Primary hypertension        4. SHELTON (obstructive sleep apnea)        5. Polyp of colon, unspecified part of colon, unspecified type        6.  Dyslipidemia

## 2023-12-27 ENCOUNTER — TELEPHONE (OUTPATIENT)
Age: 53
End: 2023-12-27

## 2023-12-27 DIAGNOSIS — E11.9 TYPE 2 DIABETES MELLITUS WITHOUT COMPLICATION, WITHOUT LONG-TERM CURRENT USE OF INSULIN (HCC): Primary | ICD-10-CM

## 2024-01-02 NOTE — PROGRESS NOTES
[facility]  [facility], [address]  [place] [address]  Phone: [phone]  Fax: [phone]      John Domingo  : 1970  PCP: Ellis Singer MD  2024    PROGRESS NOTE    HISTORY OF PRESENT ILLNESS    2020  Seen as  a new patient 2020 with c/o low back pain radiating into the posterior aspect of the RLE since 2019. Her symptoms began when she was at [facility] and they radiated all the way to her foot. Later, her pain only radiated to the foot when  it was flared up.  Note from Dr. Singer dated 19 indicating patient was seen with c/o 'sciatica' on the right side.   PREDNISONE not tolerated well as medication 'made her crazy' with jitteriness, nervousness, insomnia, palpitations, and increase in blood sugars.   PT (2020; [facility]) attended  Pt noted that she had a new pain in her left  thigh following dancing at a wedding and party, but it began to improve.  Pt noted that she saw some improvement of the RLE paraesthesia with the increased dose of GABAPENTIN , but it caused somnolence so she was unable to take it during the day  Lumbar spine MRI  dated 2020 reviewed. Per report, Moderate spinal canal and mild foraminal stenoses at L5-S1. Mild spinal canal stenosis at L4-5.    23  C/o continued lower back pain.   Saw NP [facility] on 22 for eval of progressively worsening lower back pain with right buttock pain that radiates down to her foot, where they had ordered an L3-4 RICKY for pt and Lyrica 75 mg BID was rx'd.   Pt also notes of pain of upper back and bilateral shoulder (L>R).   Presents with positive Chase's sign bilaterally and multiple beats of clonus in BLE  PLAN: Cervical MRI, Referral to PT (lower back pain), L3-4 RICKY      John Domingo is a 53 y.o. female was seen today for follow up. She continues with low back pain radiating down right leg. She underwent L3-4 RICKY (23;

## 2024-01-11 ENCOUNTER — OFFICE VISIT (OUTPATIENT)
Age: 54
End: 2024-01-11
Payer: COMMERCIAL

## 2024-01-11 DIAGNOSIS — M54.50 LUMBAR PAIN: ICD-10-CM

## 2024-01-11 DIAGNOSIS — M54.2 CERVICAL PAIN: ICD-10-CM

## 2024-01-11 DIAGNOSIS — M79.18 MYOFASCIAL PAIN: Primary | ICD-10-CM

## 2024-01-11 DIAGNOSIS — M48.062 SPINAL STENOSIS OF LUMBAR REGION WITH NEUROGENIC CLAUDICATION: ICD-10-CM

## 2024-01-11 PROCEDURE — 99213 OFFICE O/P EST LOW 20 MIN: CPT | Performed by: PHYSICAL MEDICINE & REHABILITATION

## 2024-01-15 DIAGNOSIS — M48.062 SPINAL STENOSIS OF LUMBAR REGION WITH NEUROGENIC CLAUDICATION: Primary | ICD-10-CM

## 2024-01-15 DIAGNOSIS — M54.2 CERVICAL PAIN: ICD-10-CM

## 2024-01-15 NOTE — TELEPHONE ENCOUNTER
Patient says she forgot to request a refill of Gabapentin at 1/11/24 visit - prefers to be called in to [facility] on [address].

## 2024-01-17 RX ORDER — GABAPENTIN 300 MG/1
300 CAPSULE ORAL 3 TIMES DAILY
Qty: 90 CAPSULE | Refills: 2 | Status: SHIPPED | OUTPATIENT
Start: 2024-01-17 | End: 2024-04-16

## 2024-01-17 NOTE — TELEPHONE ENCOUNTER
Patient called again and said that  was supposed to give her some Gabapentin medication at her last visit, but that nothing was called in.    Patient is asking for that the Gabapentin Medication be sent as soon as possible to her pharmacy .    [facility]   Tel. [phone]    Patient is requesting a call back at tel. [phone].    Note : patient last seen on 1/11/24.

## 2024-02-28 ENCOUNTER — TELEPHONE (OUTPATIENT)
Age: 54
End: 2024-02-28

## 2024-02-28 NOTE — TELEPHONE ENCOUNTER
Patient called and states she is having this strangling cough and not able to get rid of it. She is currently taking delsym, and tessalon perles and it is not working. She is wanting to know if you can send something into the pharmacy     Pharmacy [facility] [address] [place] [address] -  [phone] - F [phone] [[id number]]

## 2024-02-29 ENCOUNTER — TELEMEDICINE (OUTPATIENT)
Age: 54
End: 2024-02-29
Payer: COMMERCIAL

## 2024-02-29 DIAGNOSIS — R06.2 WHEEZING: Primary | ICD-10-CM

## 2024-02-29 DIAGNOSIS — J30.89 NON-SEASONAL ALLERGIC RHINITIS, UNSPECIFIED TRIGGER: ICD-10-CM

## 2024-02-29 DIAGNOSIS — J45.20 MILD INTERMITTENT ASTHMA WITHOUT COMPLICATION: ICD-10-CM

## 2024-02-29 PROCEDURE — 99214 OFFICE O/P EST MOD 30 MIN: CPT | Performed by: INTERNAL MEDICINE

## 2024-02-29 RX ORDER — FLUTICASONE FUROATE AND VILANTEROL 100; 25 UG/1; UG/1
1 POWDER RESPIRATORY (INHALATION) DAILY
Qty: 1 EACH | Refills: 11 | Status: SHIPPED | OUTPATIENT
Start: 2024-02-29

## 2024-02-29 RX ORDER — FLUTICASONE PROPIONATE 50 MCG
1 SPRAY, SUSPENSION (ML) NASAL DAILY
Qty: 16 G | Refills: 11 | Status: SHIPPED | OUTPATIENT
Start: 2024-02-29

## 2024-02-29 NOTE — PROGRESS NOTES
John Domingo 53 y.o. who was seen by synchronous (real-time) audio-video technology for   Chief Complaint   Patient presents with    Sinus Problem    Cough    Wheezing                    Assessment & Plan:      Diagnosis Orders   1. Wheezing  fluticasone furoate-vilanterol (BREO ELLIPTA) 100-25 MCG/ACT inhaler      2. Mild intermittent asthma without complication  fluticasone furoate-vilanterol (BREO ELLIPTA) 100-25 MCG/ACT inhaler      3. Non-seasonal allergic rhinitis, unspecified trigger  fluticasone (FLONASE) 50 MCG/ACT nasal spray          712  Subjective:   The last week or so, she has been having some mild upper resp symptoms that have gotten worse over the last few days.  Describes clear sinus drainage, postnasal drip, resulting cough which is mostly dry, some increased wheezing.  She's been using her albuterol 3 times a day on average, had not been using it previously.  Does not feel sick per se, no fevers, denied any purulence, ear pain, facial pressure, tooth pain.  No known exposures.  Denied any GI complaints, generalized achiness.  Has been using Tessalon Perles, Delsym, Astepro, loratadine.  She has Breo but has not used it and needs a refill    Objective:     Past Medical History:   Diagnosis Date    Acanthosis nigricans 07/2013    Allergic rhinitis     Anemia 2012    beta thalassemia Dr ELYSIA Joy; iron def from menorrhagia    Anxiety     Asthma     Chronic anemia     Dr Joy; thalassemia    Colon polyp 10/18/2022    Dr Joy    COVID-19 vaccination declined     COVID-19 virus infection     regeneron (10/21); paxlovid (11/22)    Depression     from deaths in family, got psychotherapy 2022    DM (diabetes mellitus) (HCC) [id number]    History of CVA (cerebrovascular accident) 1996    no deficits    Hyperlipidemia     Hypertension     Immunization declined     repeatedly flu, pvx, shingrix    Lumbar spinal stenosis     neurogenic claudication    Menorrhagia 03/2012    s/p endometrial ablation

## 2024-03-21 NOTE — TELEPHONE ENCOUNTER
Which dosing is available? 7.5/10/12.5?  We can call in whichever one is available    There is nothing we can do about shortages - have to adapt    If none available, will likely have to go back to ozempic (there is trulicity shortage as well)

## 2024-03-21 NOTE — TELEPHONE ENCOUNTER
----- Message from Dulce Monroy sent at 3/20/2024  3:52 PM EDT -----  Subject: Medication Problem     Medication: Tirzepatide (MOUNJARO) 15 MG/0.5ML SOPN SC injection  Dosage: 15 MG/0.5 ML  Inject 0.5 mLs into the skin once a week  Ordering Provider: Ellis Singer    Question/Problem: Patient has been out of this medication for over 2 weeks   and with the national shortage, she wants to know what she is supposed to   do to keep her sugar in balance. She is still taking metformin but is   worried without the Mounjaro what can happen. Please call to discuss.      Pharmacy: [facility] [address] [place] [address] -    [phone] - F [phone]    ---------------------------------------------------------------------------  --------------  CALL BACK INFO  [phone]; OK to leave message on voicemail  ---------------------------------------------------------------------------  --------------    SCRIPT ANSWERS  Relationship to Patient: Self

## 2024-03-22 NOTE — TELEPHONE ENCOUNTER
----- Message from Mary Leonard sent at 3/22/2024  3:10 PM EDT -----  Subject: Medication Problem     Medication: Tirzepatide (MOUNJARO) 15 MG/0.5ML SOPN SC injection  Dosage: once q 7days   Ordering Provider:     Question/Problem: Pt states that she needs the 2.5MG or a 7.5MG dosage   change on this medication due to a nation wide outage. [facility] on [address] [place] [address] Phone [phone]      Pharmacy: [facility] [address] [place] [address] [facility] - P   [phone] - F [phone]    ---------------------------------------------------------------------------  --------------  CALL BACK INFO  [phone]; OK to leave message on voicemail  ---------------------------------------------------------------------------  --------------    SCRIPT ANSWERS  Relationship to Patient: Self

## 2024-03-27 NOTE — TELEPHONE ENCOUNTER
----- Message from Neda Lobo sent at 3/26/2024  4:07 PM EDT -----  Subject: Medication Problem     Medication: Tirzepatide (MOUNJARO) 12.5 MG/0.5ML SOPN SC injection  Dosage: 12.5 MG  Ordering Provider: Dr. Singer    Question/Problem: Patient would like to speak to the Dr. Singer about a   script she hasn't had for 3 weeks now, Mounjaro. She was taking 12.5 MG   but pharmacy only has 2.5 and that was at [facility] in [facility]. Wanted to talk with him about a different dosage. Wants to know   if she can just get that one since there is a nationwide shortage. She   just wants to have a dose. Please call to advise.       Pharmacy: [facility] [id number] - [address] -   P [phone] - F [phone]    ---------------------------------------------------------------------------  --------------  CALL BACK INFO  [phone]; OK to leave message on voicemail  ---------------------------------------------------------------------------  --------------    SCRIPT ANSWERS  Relationship to Patient: Self

## 2024-03-29 ENCOUNTER — TELEPHONE (OUTPATIENT)
Age: 54
End: 2024-03-29

## 2024-03-29 NOTE — TELEPHONE ENCOUNTER
03/28/2024 Ozempic sent to patient's pharmacy, called pharmacy and able to get medication released. Patient informed.     Patient states that after the clinic contacted the pharmacy she called the pharmacy and they stated there was a discrepancy in the dosage. Pharmacy did not call or fax over any documentation about the discrepancy. Patient states the pharmacy informed her that they had tried to contact the office but did not get an answer or person on the line. Patient became upset about us not trying to do everything to get her medication and was informed that everything else would be set aside so a phone call to the pharmacy could be made.     Spoke to Marcos at [facility] who stated that the dosage of 2 mg did not match the Sig: inject 0.75 mLs weekly. Was able to give a verbal change to order to read inject 2 mg into the skin every 7 days; Marcos took my name and stated that they will be able to dispense medication now.

## 2024-03-29 NOTE — TELEPHONE ENCOUNTER
Patient returned call to clinic, informed her of the message from pharmacy, patient apologized for negative behavior and verbalized understanding.

## 2024-04-15 ENCOUNTER — OFFICE VISIT (OUTPATIENT)
Age: 54
End: 2024-04-15
Payer: COMMERCIAL

## 2024-04-15 VITALS — HEIGHT: 65 IN | BODY MASS INDEX: 36.52 KG/M2 | WEIGHT: 219.2 LBS

## 2024-04-15 DIAGNOSIS — M65.311 TRIGGER THUMB OF RIGHT HAND: Primary | ICD-10-CM

## 2024-04-15 PROCEDURE — 20550 NJX 1 TENDON SHEATH/LIGAMENT: CPT | Performed by: ORTHOPAEDIC SURGERY

## 2024-04-15 PROCEDURE — 99203 OFFICE O/P NEW LOW 30 MIN: CPT | Performed by: ORTHOPAEDIC SURGERY

## 2024-04-15 RX ORDER — LIDOCAINE HYDROCHLORIDE 10 MG/ML
0.5 INJECTION, SOLUTION INFILTRATION; PERINEURAL ONCE
Status: COMPLETED | OUTPATIENT
Start: 2024-04-15 | End: 2024-04-15

## 2024-04-15 RX ADMIN — LIDOCAINE HYDROCHLORIDE 0.5 ML: 10 INJECTION, SOLUTION INFILTRATION; PERINEURAL at 14:37

## 2024-04-15 NOTE — PROGRESS NOTES
potassium chloride (KLOR-CON M) 20 MEQ extended release tablet Take 1 tablet by mouth daily 90 tablet 3    albuterol sulfate HFA (PROVENTIL;VENTOLIN;PROAIR) 108 (90 Base) MCG/ACT inhaler Inhale 1-2 puffs into the lungs every 4 hours as needed      pregabalin (LYRICA) 75 MG capsule Take 1 capsule by mouth 2 times daily as needed. (Patient not taking: Reported on 4/15/2024)       Current Facility-Administered Medications   Medication Dose Route Frequency Provider Last Rate Last Admin    lidocaine 1 % injection 0.5 mL  0.5 mL Other Once Tres Lombardi,         triamcinolone acetonide (KENALOG) injection 5 mg  5 mg Intra-LESional Once Tres Lombardi, DO           Allergies   Allergen Reactions    Penicillins Rash     Tongue swelling; difficulty swallowing; thrush    Canagliflozin Other (See Comments)     Yeast infections    Prochlorperazine Other (See Comments)     paranoia    Strawberry Swelling     Swelling and itching of mouth, lips, and tongue         Ht 1.651 m (5' 5\")   Wt 99.4 kg (219 lb 3.2 oz)   BMI 36.48 kg/m²   Physical Exam  Vitals and nursing note reviewed.   Constitutional:       General: She is not in acute distress.     Appearance: Normal appearance. She is not ill-appearing.   Cardiovascular:      Pulses: Normal pulses.   Pulmonary:      Effort: Pulmonary effort is normal. No respiratory distress.   Musculoskeletal:         General: Swelling and tenderness present. No deformity or signs of injury. Normal range of motion.      Cervical back: Normal range of motion and neck supple.      Right lower leg: No edema.      Left lower leg: No edema.   Skin:     General: Skin is warm and dry.      Capillary Refill: Capillary refill takes less than 2 seconds.      Findings: No bruising or erythema.   Neurological:      General: No focal deficit present.      Mental Status: She is alert and oriented to person, place, and time.   Psychiatric:         Mood and Affect: Mood normal.         Behavior: Behavior

## 2024-05-10 RX ORDER — ERGOCALCIFEROL 1.25 MG/1
CAPSULE ORAL
Qty: 12 CAPSULE | Refills: 3 | Status: SHIPPED | OUTPATIENT
Start: 2024-05-10

## 2024-05-15 ENCOUNTER — OFFICE VISIT (OUTPATIENT)
Age: 54
End: 2024-05-15
Payer: COMMERCIAL

## 2024-05-15 ENCOUNTER — HOSPITAL ENCOUNTER (OUTPATIENT)
Facility: HOSPITAL | Age: 54
Discharge: HOME | End: 2024-05-18
Payer: COMMERCIAL

## 2024-05-15 VITALS
BODY MASS INDEX: 36.49 KG/M2 | HEIGHT: 65 IN | WEIGHT: 219 LBS | TEMPERATURE: 97.8 F | RESPIRATION RATE: 16 BRPM | DIASTOLIC BLOOD PRESSURE: 87 MMHG | SYSTOLIC BLOOD PRESSURE: 138 MMHG | OXYGEN SATURATION: 98 % | HEART RATE: 82 BPM

## 2024-05-15 DIAGNOSIS — R05.3 CHRONIC COUGH: ICD-10-CM

## 2024-05-15 DIAGNOSIS — R05.3 CHRONIC COUGH: Primary | ICD-10-CM

## 2024-05-15 DIAGNOSIS — J45.20 MILD INTERMITTENT ASTHMA WITHOUT COMPLICATION: ICD-10-CM

## 2024-05-15 PROCEDURE — 3075F SYST BP GE 130 - 139MM HG: CPT | Performed by: INTERNAL MEDICINE

## 2024-05-15 PROCEDURE — 3079F DIAST BP 80-89 MM HG: CPT | Performed by: INTERNAL MEDICINE

## 2024-05-15 PROCEDURE — 99214 OFFICE O/P EST MOD 30 MIN: CPT | Performed by: INTERNAL MEDICINE

## 2024-05-15 PROCEDURE — 71046 X-RAY EXAM CHEST 2 VIEWS: CPT

## 2024-05-15 RX ORDER — PREDNISONE 10 MG/1
TABLET ORAL
Qty: 1 EACH | Refills: 0 | Status: SHIPPED | OUTPATIENT
Start: 2024-05-15

## 2024-05-15 RX ORDER — ALBUTEROL SULFATE 90 UG/1
1-2 AEROSOL, METERED RESPIRATORY (INHALATION) EVERY 4 HOURS PRN
Qty: 18 G | Refills: 5 | Status: SHIPPED | OUTPATIENT
Start: 2024-05-15

## 2024-05-15 RX ORDER — CODEINE PHOSPHATE/GUAIFENESIN 10-100MG/5
5 LIQUID (ML) ORAL 3 TIMES DAILY PRN
Qty: 100 ML | Refills: 0 | Status: SHIPPED | OUTPATIENT
Start: 2024-05-15 | End: 2024-05-25

## 2024-05-15 NOTE — PROGRESS NOTES
John Domingo presents today for   Chief Complaint   Patient presents with    Cough     Patient reports using Tessalon capsule, would like either a refill of this medication or something that will better help with cough.    \"Have you been to the ER, urgent care clinic since your last visit?  Hospitalized since your last visit?\"    NO    “Have you seen or consulted any other health care providers outside of [facility] since your last visit?”    NO        “Have you had a pap smear?”    NO    Date of last Cervical Cancer screen (HPV or PAP): 2/4/2013           
(219 lb)    Height: 1.651 m (5' 5\")    No use of accessory muscles.  Sinuses nontender, minimally boggy mucosa, oropharynx otherwise benign, no adenopathy.  Lungs are clear with good breath sounds.  Heart showed regular rate rhythm.  No S3.  Abdomen soft nontender.  Extremities no edema    Assessment and plan:  1.  Chronic cough.  Possibly from asthma?  We will get chest x-ray, prednisone taper given, consult pulmonary, robit ac given  2.  Asthma.  Steroid as above, pulm consult      Above conditions discussed at length and patient vocalized understanding.  All questions answered to patient satisfaction       Diagnosis Orders   1. Chronic cough  albuterol sulfate HFA (PROVENTIL;VENTOLIN;PROAIR) 108 (90 Base) MCG/ACT inhaler    predniSONE 10 MG (21) TBPK    XR CHEST (2 VW)    External Referral To Pulmonology    guaiFENesin-codeine (TUSSI-ORGANIDIN NR) 100-10 MG/5ML syrup      2. Mild intermittent asthma without complication  albuterol sulfate HFA (PROVENTIL;VENTOLIN;PROAIR) 108 (90 Base) MCG/ACT inhaler    predniSONE 10 MG (21) TBPK    XR CHEST (2 VW)    External Referral To Pulmonology

## 2024-05-20 ENCOUNTER — TELEPHONE (OUTPATIENT)
Age: 54
End: 2024-05-20

## 2024-05-20 NOTE — TELEPHONE ENCOUNTER
----- Message from John Domingo sent at 5/20/2024  9:15 AM EDT -----  Regarding: Prednisone  Contact: [phone]  I’m feeling a little dizzy and lightheaded and trouble focusing. Is this normal? The pills will be done by tomorrow. I only have 1 day left.

## 2024-05-20 NOTE — TELEPHONE ENCOUNTER
Everyone can have dizzy days and trouble focusing  Call back if persistent    Is the cough better?

## 2024-06-04 ENCOUNTER — HOSPITAL ENCOUNTER (OUTPATIENT)
Facility: HOSPITAL | Age: 54
Discharge: HOME | End: 2024-06-07

## 2024-06-04 LAB — SENTARA SPECIMEN COLLECTION: NORMAL

## 2024-06-04 PROCEDURE — 99001 SPECIMEN HANDLING PT-LAB: CPT

## 2024-06-05 LAB
ESTIMATED AVERAGE GLUCOSE: 196 MG/DL (ref 91–123)
HBA1C MFR BLD: 8.5 % (ref 4.8–5.6)

## 2024-06-13 DIAGNOSIS — E11.9 TYPE 2 DIABETES MELLITUS WITHOUT COMPLICATION, WITHOUT LONG-TERM CURRENT USE OF INSULIN (HCC): ICD-10-CM

## 2024-07-02 DIAGNOSIS — I10 PRIMARY HYPERTENSION: ICD-10-CM

## 2024-07-02 RX ORDER — CANDESARTAN 16 MG/1
16 TABLET ORAL DAILY
Qty: 90 TABLET | Refills: 3 | Status: SHIPPED | OUTPATIENT
Start: 2024-07-02

## 2024-07-02 RX ORDER — CHLORTHALIDONE 25 MG/1
25 TABLET ORAL DAILY
Qty: 90 TABLET | Refills: 3 | Status: SHIPPED | OUTPATIENT
Start: 2024-07-02

## 2024-07-02 RX ORDER — AMLODIPINE BESYLATE 5 MG/1
TABLET ORAL
Qty: 90 TABLET | Refills: 3 | Status: SHIPPED | OUTPATIENT
Start: 2024-07-02

## 2024-07-12 ENCOUNTER — TELEPHONE (OUTPATIENT)
Facility: CLINIC | Age: 54
End: 2024-07-12

## 2024-07-12 DIAGNOSIS — L03.311 CELLULITIS OF ABDOMINAL WALL: Primary | ICD-10-CM

## 2024-07-12 RX ORDER — DOXYCYCLINE HYCLATE 100 MG
100 TABLET ORAL 2 TIMES DAILY
Qty: 28 TABLET | Refills: 0 | Status: SHIPPED | OUTPATIENT
Start: 2024-07-12 | End: 2024-07-26

## 2024-07-12 NOTE — TELEPHONE ENCOUNTER
Patient called office stating that in her last appt she was going to be prescribe an antibiotic for a cyst/boil on stomach.Please Advise

## 2024-07-15 RX ORDER — POTASSIUM CHLORIDE 20 MEQ/1
20 TABLET, EXTENDED RELEASE ORAL DAILY
Qty: 90 TABLET | Refills: 3 | Status: SHIPPED | OUTPATIENT
Start: 2024-07-15

## 2024-07-16 ENCOUNTER — HOSPITAL ENCOUNTER (OUTPATIENT)
Facility: HOSPITAL | Age: 54
Discharge: HOME | End: 2024-07-19
Payer: COMMERCIAL

## 2024-07-16 DIAGNOSIS — E04.2 MULTIPLE THYROID NODULES: ICD-10-CM

## 2024-07-16 PROCEDURE — 76536 US EXAM OF HEAD AND NECK: CPT

## 2024-07-18 RX ORDER — POTASSIUM CHLORIDE 1500 MG/1
20 TABLET, EXTENDED RELEASE ORAL DAILY
Qty: 90 TABLET | Refills: 3 | OUTPATIENT
Start: 2024-07-18

## 2024-07-31 RX ORDER — TIRZEPATIDE 15 MG/.5ML
15 INJECTION, SOLUTION SUBCUTANEOUS WEEKLY
Qty: 4 ADJUSTABLE DOSE PRE-FILLED PEN SYRINGE | Refills: 11 | Status: SHIPPED | OUTPATIENT
Start: 2024-07-31 | End: 2025-07-26

## 2024-08-02 NOTE — TELEPHONE ENCOUNTER
Pt stated that [facility] is requesting referral for:    Mounjaro 15 mg    Pt is waiting to hear back to see if fax was received     Pt requesting a call back

## 2024-08-04 RX ORDER — CITALOPRAM 40 MG/1
TABLET ORAL
Qty: 90 TABLET | Refills: 3 | Status: SHIPPED | OUTPATIENT
Start: 2024-08-04

## 2024-08-05 ENCOUNTER — TELEPHONE (OUTPATIENT)
Facility: CLINIC | Age: 54
End: 2024-08-05

## 2024-08-05 RX ORDER — TIRZEPATIDE 15 MG/.5ML
INJECTION, SOLUTION SUBCUTANEOUS
Refills: 0 | OUTPATIENT
Start: 2024-08-05

## 2024-08-05 NOTE — TELEPHONE ENCOUNTER
Regarding: Ear throat pain/Rx request   Contact: [phone]  ----- Message from Ellis Singer MD sent at 8/4/2024  7:26 PM EDT -----       ----- Message from John Domingo to Ellis Singer MD sent at 8/1/2024  6:05 AM -----   Good Morning,    I’m having some ear, face and throat pain on the left side of my face. It has been present for about 3 days. I’ve taken some Ibuprofen periodically to help with pain.    Also I called [facility] to see if they had Mounjaro in. They have it but a need a referral that covers 3 months at a time and my last referral expires in August. Will I need a new referral to cover the new request? Please advise on both concerns.     Thank you,  John Domingo

## 2024-08-06 RX ORDER — TIRZEPATIDE 15 MG/.5ML
15 INJECTION, SOLUTION SUBCUTANEOUS WEEKLY
Qty: 12 ADJUSTABLE DOSE PRE-FILLED PEN SYRINGE | Refills: 2 | Status: SHIPPED | OUTPATIENT
Start: 2024-08-06 | End: 2025-08-01

## 2024-08-21 RX ORDER — SPIRONOLACTONE 25 MG/1
TABLET ORAL
Qty: 90 TABLET | Refills: 3 | Status: SHIPPED | OUTPATIENT
Start: 2024-08-21

## 2024-08-25 ENCOUNTER — TELEPHONE (OUTPATIENT)
Facility: CLINIC | Age: 54
End: 2024-08-25

## 2024-09-06 RX ORDER — ATORVASTATIN CALCIUM 80 MG/1
TABLET, FILM COATED ORAL
Qty: 90 TABLET | Refills: 3 | Status: SHIPPED | OUTPATIENT
Start: 2024-09-06

## 2024-09-13 ENCOUNTER — PATIENT MESSAGE (OUTPATIENT)
Facility: CLINIC | Age: 54
End: 2024-09-13

## 2024-09-13 DIAGNOSIS — E11.9 TYPE 2 DIABETES MELLITUS WITHOUT COMPLICATION, WITHOUT LONG-TERM CURRENT USE OF INSULIN (HCC): ICD-10-CM

## 2024-09-16 RX ORDER — METFORMIN HCL 500 MG
TABLET, EXTENDED RELEASE 24 HR ORAL
Qty: 180 TABLET | Refills: 3 | Status: SHIPPED | OUTPATIENT
Start: 2024-09-16 | End: 2024-09-17 | Stop reason: SDUPTHER

## 2024-09-19 RX ORDER — METFORMIN HCL 500 MG
TABLET, EXTENDED RELEASE 24 HR ORAL
Qty: 180 TABLET | Refills: 3 | Status: SHIPPED | OUTPATIENT
Start: 2024-09-19

## 2024-10-04 ENCOUNTER — TELEPHONE (OUTPATIENT)
Facility: CLINIC | Age: 54
End: 2024-10-04

## 2024-10-04 DIAGNOSIS — G47.33 OSA (OBSTRUCTIVE SLEEP APNEA): ICD-10-CM

## 2024-10-04 DIAGNOSIS — J45.20 MILD INTERMITTENT ASTHMA WITHOUT COMPLICATION: Primary | ICD-10-CM

## 2024-10-04 NOTE — TELEPHONE ENCOUNTER
Patient called in needing a new pulmonary referral. Provider originally referred to [facility] but they are booked way out. Please advise.    Provider patient wants to be referred to:  [facility] [place], [facility]  [address], [place] [address]  Phone number: [phone]  Fax number: [phone]

## 2024-10-04 NOTE — TELEPHONE ENCOUNTER
Please advise if ok to change referral to Dr. TAMI Nash. His office has been able to accommodate patients with appointments.

## 2024-10-07 NOTE — TELEPHONE ENCOUNTER
Referral sent     Diagnosis Orders   1. Mild intermittent asthma without complication  External Referral To Pulmonology      2. SHELTON (obstructive sleep apnea)  External Referral To Pulmonology

## 2024-10-11 NOTE — TELEPHONE ENCOUNTER
PCP: Ellis Singer MD    LAST OFFICE VISIT: 06/12/2024    LAST REFILL PER CHART:  Medication:Tirzepatide (MOUNJARO) 15 MG/0.5ML SOPN SC injection   Ordered On:08/06/2024  Instructions:Inject 0.5 mLs into the skin once a week   Dispense:12  Refills:2      Future Appointments   Date Time Provider Department Center   12/10/2024  2:40 PM Ellis Singer MD [facility] DEP

## 2024-10-15 RX ORDER — TIRZEPATIDE 15 MG/.5ML
15 INJECTION, SOLUTION SUBCUTANEOUS WEEKLY
Qty: 6 ML | Refills: 3 | Status: SHIPPED | OUTPATIENT
Start: 2024-10-15

## 2024-10-31 ENCOUNTER — OFFICE VISIT (OUTPATIENT)
Age: 54
End: 2024-10-31

## 2024-10-31 VITALS — WEIGHT: 213 LBS | BODY MASS INDEX: 35.49 KG/M2 | HEIGHT: 65 IN

## 2024-10-31 DIAGNOSIS — M65.311 TRIGGER THUMB OF RIGHT HAND: Primary | ICD-10-CM

## 2024-10-31 RX ORDER — LIDOCAINE HYDROCHLORIDE 10 MG/ML
0.5 INJECTION, SOLUTION INFILTRATION; PERINEURAL ONCE
Status: COMPLETED | OUTPATIENT
Start: 2024-10-31 | End: 2024-10-31

## 2024-10-31 RX ADMIN — LIDOCAINE HYDROCHLORIDE 0.5 ML: 10 INJECTION, SOLUTION INFILTRATION; PERINEURAL at 15:54

## 2024-10-31 NOTE — PROGRESS NOTES
Take 1 tablet by mouth daily      predniSONE 10 MG (21) TBPK Use as directed (Patient not taking: Reported on 6/12/2024) 1 each 0    gabapentin (NEURONTIN) 300 MG capsule Take 1 capsule by mouth 3 times daily for 90 days. Max Daily Amount: 900 mg 90 capsule 2     Current Facility-Administered Medications   Medication Dose Route Frequency Provider Last Rate Last Admin    triamcinolone acetonide (KENALOG) injection 5 mg  5 mg Intra-artICUlar Once         lidocaine 1 % injection 0.5 mL  0.5 mL Other Once            Allergies   Allergen Reactions    Penicillins Rash     Tongue swelling; difficulty swallowing; thrush    Canagliflozin Other (See Comments)     Yeast infections    Prochlorperazine Other (See Comments)     paranoia    Strawberry Swelling     Swelling and itching of mouth, lips, and tongue         Ht 1.651 m (5' 5\")   Wt 96.6 kg (213 lb)   BMI 35.45 kg/m²   Physical Exam  Vitals and nursing note reviewed.   Constitutional:       General: She is not in acute distress.     Appearance: Normal appearance. She is not ill-appearing.   Cardiovascular:      Pulses: Normal pulses.   Pulmonary:      Effort: Pulmonary effort is normal. No respiratory distress.   Musculoskeletal:         General: Swelling and tenderness present. No deformity or signs of injury. Normal range of motion.      Cervical back: Normal range of motion and neck supple.      Right lower leg: No edema.      Left lower leg: No edema.   Skin:     General: Skin is warm and dry.      Capillary Refill: Capillary refill takes less than 2 seconds.      Findings: No bruising or erythema.   Neurological:      General: No focal deficit present.      Mental Status: She is alert and oriented to person, place, and time.   Psychiatric:         Mood and Affect: Mood normal.         Behavior: Behavior normal.          Hand:    Examination L Digit(s) R Digit(s)   1st [facility] Tenderness -  -    1st [facility] Grind -  -    Hanny Nodes -  -    [place] Nodes -  -    A1

## 2024-11-19 ENCOUNTER — TELEPHONE (OUTPATIENT)
Facility: CLINIC | Age: 54
End: 2024-11-19

## 2024-11-19 NOTE — TELEPHONE ENCOUNTER
This is likely viral we don't start abx this early with those sx  Continue otc meds  We can call in lois arce or tussionex for cough if she wants

## 2024-11-19 NOTE — TELEPHONE ENCOUNTER
Pt called she is asking to get something sent to her pharmacy or get an appointment . Since Sunday 11/17 pt has had scratchy throat,,\"burning feeling in her chest\" and cough she is negative for covid she has been treating symptoms with DELSYM snd IBUPROFEN

## 2024-11-27 ENCOUNTER — PATIENT MESSAGE (OUTPATIENT)
Facility: CLINIC | Age: 54
End: 2024-11-27

## 2024-11-27 ENCOUNTER — TELEPHONE (OUTPATIENT)
Facility: CLINIC | Age: 54
End: 2024-11-27

## 2024-11-27 DIAGNOSIS — R05.9 COUGH, UNSPECIFIED TYPE: Primary | ICD-10-CM

## 2024-11-27 DIAGNOSIS — R06.2 WHEEZING: ICD-10-CM

## 2024-11-27 DIAGNOSIS — J40 BRONCHITIS: ICD-10-CM

## 2024-11-29 RX ORDER — HYDROCODONE POLISTIREX AND CHLORPHENIRAMINE POLISTIREX 10; 8 MG/5ML; MG/5ML
5 SUSPENSION, EXTENDED RELEASE ORAL EVERY 12 HOURS PRN
Qty: 100 ML | Refills: 0 | Status: SHIPPED | OUTPATIENT
Start: 2024-11-29 | End: 2024-12-09

## 2024-11-29 RX ORDER — AZITHROMYCIN 250 MG/1
TABLET, FILM COATED ORAL
Qty: 6 TABLET | Refills: 0 | Status: SHIPPED | OUTPATIENT
Start: 2024-11-29 | End: 2024-12-09

## 2024-11-29 RX ORDER — PREDNISONE 20 MG/1
20 TABLET ORAL DAILY
Qty: 5 TABLET | Refills: 0 | Status: SHIPPED | OUTPATIENT
Start: 2024-11-29 | End: 2024-12-04

## 2024-12-12 DIAGNOSIS — E11.9 TYPE 2 DIABETES MELLITUS WITHOUT COMPLICATION, WITHOUT LONG-TERM CURRENT USE OF INSULIN (HCC): ICD-10-CM

## 2024-12-12 DIAGNOSIS — E04.2 MULTIPLE THYROID NODULES: ICD-10-CM

## 2025-02-04 ENCOUNTER — HOSPITAL ENCOUNTER (OUTPATIENT)
Facility: HOSPITAL | Age: 55
Setting detail: SPECIMEN
Discharge: HOME | End: 2025-02-07

## 2025-02-04 LAB — SENTARA SPECIMEN COLLECTION: NORMAL

## 2025-02-04 PROCEDURE — 99001 SPECIMEN HANDLING PT-LAB: CPT

## 2025-02-05 ENCOUNTER — TELEMEDICINE (OUTPATIENT)
Facility: CLINIC | Age: 55
End: 2025-02-05
Payer: COMMERCIAL

## 2025-02-05 ENCOUNTER — TELEPHONE (OUTPATIENT)
Age: 55
End: 2025-02-05

## 2025-02-05 ENCOUNTER — HOSPITAL ENCOUNTER (OUTPATIENT)
Facility: HOSPITAL | Age: 55
Setting detail: SPECIMEN
Discharge: HOME | End: 2025-02-08

## 2025-02-05 DIAGNOSIS — G62.9 NEUROPATHY: Primary | ICD-10-CM

## 2025-02-05 DIAGNOSIS — G62.9 NEUROPATHY: ICD-10-CM

## 2025-02-05 LAB
A/G RATIO: 1.5 RATIO (ref 1.1–2.6)
ALBUMIN: 4.3 G/DL (ref 3.5–5)
ALP BLD-CCNC: 95 U/L (ref 25–115)
ALT SERPL-CCNC: 13 U/L (ref 5–40)
ANION GAP SERPL CALCULATED.3IONS-SCNC: 10 MMOL/L (ref 3–15)
AST SERPL-CCNC: 14 U/L (ref 10–37)
BASOPHILS # BLD: 1 % (ref 0–2)
BASOPHILS ABSOLUTE: 0 K/UL (ref 0–0.2)
BILIRUB SERPL-MCNC: 0.3 MG/DL (ref 0.2–1.2)
BUN BLDV-MCNC: 19 MG/DL (ref 6–22)
CALCIUM SERPL-MCNC: 9.9 MG/DL (ref 8.4–10.5)
CHLORIDE BLD-SCNC: 101 MMOL/L (ref 98–110)
CHOLESTEROL, TOTAL: 183 MG/DL (ref 110–200)
CHOLESTEROL/HDL RATIO: 4.5 (ref 0–5)
CO2: 27 MMOL/L (ref 20–32)
CREAT SERPL-MCNC: 0.7 MG/DL (ref 0.5–1.2)
EOSINOPHIL # BLD: 3 % (ref 0–6)
EOSINOPHILS ABSOLUTE: 0.1 K/UL (ref 0–0.5)
GFR, ESTIMATED: >60 ML/MIN/1.73 SQ.M.
GLOBULIN: 2.9 G/DL (ref 2–4)
GLUCOSE: 105 MG/DL (ref 70–99)
HCT VFR BLD CALC: 37.4 % (ref 35.1–48)
HDLC SERPL-MCNC: 41 MG/DL
HEMOGLOBIN: 10.6 G/DL (ref 11.7–16)
LDL CHOLESTEROL: 123 MG/DL (ref 50–99)
LDL/HDL RATIO: 3
LYMPHOCYTES # BLD: 31 % (ref 20–45)
LYMPHOCYTES ABSOLUTE: 1.3 K/UL (ref 1–4.8)
MCH RBC QN AUTO: 22 PG (ref 26–34)
MCHC RBC AUTO-ENTMCNC: 28 G/DL (ref 31–36)
MCV RBC AUTO: 78 FL (ref 80–99)
MONOCYTES ABSOLUTE: 0.4 K/UL (ref 0.1–1)
MONOCYTES: 9 % (ref 3–12)
NEUTROPHILS ABSOLUTE: 2.4 K/UL (ref 1.8–7.7)
NEUTROPHILS SEGMENTED: 57 % (ref 40–75)
NON-HDL CHOLESTEROL: 142 MG/DL
PDW BLD-RTO: 16 % (ref 10–15.5)
PLATELET # BLD: 255 K/UL (ref 140–440)
PMV BLD AUTO: 12 FL (ref 9–13)
POTASSIUM SERPL-SCNC: 3.6 MMOL/L (ref 3.5–5.5)
RBC # BLD: 4.79 M/UL (ref 3.8–5.2)
SENTARA SPECIMEN COLLECTION: NORMAL
SODIUM BLD-SCNC: 138 MMOL/L (ref 133–145)
T4 FREE: 1 NG/DL (ref 0.9–1.8)
TOTAL PROTEIN: 7.2 G/DL (ref 6.4–8.3)
TRIGL SERPL-MCNC: 93 MG/DL (ref 40–149)
TSH SERPL DL<=0.05 MIU/L-ACNC: 1.78 MCU/ML (ref 0.27–4.2)
VLDLC SERPL CALC-MCNC: 19 MG/DL (ref 8–30)
WBC # BLD: 4.2 K/UL (ref 4–11)

## 2025-02-05 PROCEDURE — 99214 OFFICE O/P EST MOD 30 MIN: CPT | Performed by: INTERNAL MEDICINE

## 2025-02-05 PROCEDURE — 99001 SPECIMEN HANDLING PT-LAB: CPT

## 2025-02-05 NOTE — PROGRESS NOTES
John Howellyuliana Domingo 54 y.o. who was seen by synchronous (real-time) audio-video technology for   Chief Complaint   Patient presents with    Tingling             Assessment & Plan:      Diagnosis Orders   1. Neuropathy  [facility] - Jerrod James MD, Neurology, [place] [facility])    Vitamin B12 & Folate    Methylmalonic Acid, Serum    Antinuclear AB Screen IFA    ROBERT and PE, Serum          712  Subjective:   She has diabetes but is not known to have neuropathy.  She is on gabapentin through Dr. Zaidi for lumbar radiculopathy.    She reports that for the last several months, she has had a burning discomfort in her scalp.  So much so that she actually shaved her scalp she has so that she would have to touch the hair.  In the last couple of weeks, she has noted discomfort in the right periauricular and cheek region.  Denies lancinating component, more of a recurring persistent pain.  No tooth pain, sinus symptoms, fevers.  For the last month or so, she has been having burning in her arms and legs as well.  Maybe some numbness, denied any focal neurologic complaints, incoordination.  She had been taking gabapentin sparingly but over the last week or so, she is taking it 3 times a day as ordered and it does seem to help a lot of her symptoms.    Objective:     Past Medical History:   Diagnosis Date    Acanthosis nigricans 07/2013    Allergic rhinitis     Anxiety     Asthma     Chronic anemia 2012    beta thalassemia Dr ELYSIA Joy; iron def from menorrhagia    Colon polyp 10/18/2022    Dr Joy    COVID-19 vaccination declined     COVID-19 virus infection     regeneron (10/21); paxlovid (11/22)    Depression     from deaths in family, got psychotherapy 2022    DM (diabetes mellitus) (HCC) [id number]    History of CVA (cerebrovascular accident) 1996    no deficits    Hyperlipidemia     Hypertension     Immunization declined     repeatedly flu, pvx, shingrix    Lumbar spinal stenosis     neurogenic

## 2025-02-05 NOTE — TELEPHONE ENCOUNTER
Patient called in and has ask for a new RX for the Gabapentin     Please advise patient @ [phone]

## 2025-02-06 DIAGNOSIS — M54.2 CERVICAL PAIN: ICD-10-CM

## 2025-02-06 DIAGNOSIS — M48.062 SPINAL STENOSIS OF LUMBAR REGION WITH NEUROGENIC CLAUDICATION: ICD-10-CM

## 2025-02-06 LAB
FOLATE: 12.9 NG/ML
VITAMIN B-12: 1351 PG/ML (ref 211–911)

## 2025-02-07 LAB
ALBUMIN: 46.9 % (ref 46.6–62.6)
ALBUMIN: 46.9 % (ref 46.6–62.6)
ALPHA-1-GLOBULIN: 3.2 % (ref 1.7–4.1)
ALPHA-1-GLOBULIN: 3.2 % (ref 1.7–4.1)
ALPHA-2-GLOBULIN: 8.9 % (ref 5.9–13.5)
ALPHA-2-GLOBULIN: 8.9 % (ref 5.9–13.5)
BETA GLOBULIN: 18.9 % (ref 10.9–18.9)
BETA GLOBULIN: 18.9 % (ref 10.9–18.9)
GAMMA GLOBULIN: 22.1 % (ref 11.6–24.4)
GAMMA GLOBULIN: 22.1 % (ref 11.6–24.4)
IFE INTERPRETATION: NORMAL
PROTEIN ELECTROPHORESIS INTERPRETATION: NORMAL
PROTEIN ELECTROPHORESIS, SERUM: 7.9 G/DL (ref 6.4–8.3)
PROTEIN ELECTROPHORESIS, SERUM: 7.9 G/DL (ref 6.4–8.3)

## 2025-02-07 RX ORDER — GABAPENTIN 300 MG/1
CAPSULE ORAL
Qty: 90 CAPSULE | Refills: 0 | OUTPATIENT
Start: 2025-02-07

## 2025-02-08 LAB — METHYLMALONIC ACID: 114 NMOL/L (ref 55–335)

## 2025-02-10 DIAGNOSIS — M48.062 SPINAL STENOSIS OF LUMBAR REGION WITH NEUROGENIC CLAUDICATION: ICD-10-CM

## 2025-02-10 DIAGNOSIS — M54.2 CERVICAL PAIN: ICD-10-CM

## 2025-02-10 RX ORDER — GABAPENTIN 300 MG/1
300 CAPSULE ORAL 3 TIMES DAILY
Qty: 90 CAPSULE | Refills: 2 | Status: CANCELLED | OUTPATIENT
Start: 2025-02-10 | End: 2025-05-11

## 2025-02-10 SDOH — ECONOMIC STABILITY: FOOD INSECURITY: WITHIN THE PAST 12 MONTHS, THE FOOD YOU BOUGHT JUST DIDN'T LAST AND YOU DIDN'T HAVE MONEY TO GET MORE.: NEVER TRUE

## 2025-02-10 SDOH — ECONOMIC STABILITY: FOOD INSECURITY: WITHIN THE PAST 12 MONTHS, YOU WORRIED THAT YOUR FOOD WOULD RUN OUT BEFORE YOU GOT MONEY TO BUY MORE.: NEVER TRUE

## 2025-02-10 SDOH — ECONOMIC STABILITY: INCOME INSECURITY: IN THE LAST 12 MONTHS, WAS THERE A TIME WHEN YOU WERE NOT ABLE TO PAY THE MORTGAGE OR RENT ON TIME?: NO

## 2025-02-10 SDOH — ECONOMIC STABILITY: TRANSPORTATION INSECURITY
IN THE PAST 12 MONTHS, HAS THE LACK OF TRANSPORTATION KEPT YOU FROM MEDICAL APPOINTMENTS OR FROM GETTING MEDICATIONS?: NO

## 2025-02-11 LAB — ANA BY IFA: NEGATIVE

## 2025-02-12 ENCOUNTER — OFFICE VISIT (OUTPATIENT)
Facility: CLINIC | Age: 55
End: 2025-02-12
Payer: COMMERCIAL

## 2025-02-12 VITALS
BODY MASS INDEX: 34.92 KG/M2 | OXYGEN SATURATION: 98 % | HEIGHT: 65 IN | RESPIRATION RATE: 17 BRPM | DIASTOLIC BLOOD PRESSURE: 74 MMHG | SYSTOLIC BLOOD PRESSURE: 110 MMHG | TEMPERATURE: 97.1 F | WEIGHT: 209.6 LBS | HEART RATE: 93 BPM

## 2025-02-12 DIAGNOSIS — E11.9 TYPE 2 DIABETES MELLITUS WITHOUT COMPLICATION, WITHOUT LONG-TERM CURRENT USE OF INSULIN (HCC): Primary | ICD-10-CM

## 2025-02-12 DIAGNOSIS — E04.2 MULTIPLE THYROID NODULES: ICD-10-CM

## 2025-02-12 DIAGNOSIS — M48.061 SPINAL STENOSIS, LUMBAR REGION, WITHOUT NEUROGENIC CLAUDICATION: ICD-10-CM

## 2025-02-12 DIAGNOSIS — I10 PRIMARY HYPERTENSION: ICD-10-CM

## 2025-02-12 DIAGNOSIS — E78.5 DYSLIPIDEMIA: ICD-10-CM

## 2025-02-12 DIAGNOSIS — M54.2 CERVICAL PAIN: ICD-10-CM

## 2025-02-12 DIAGNOSIS — M48.062 SPINAL STENOSIS OF LUMBAR REGION WITH NEUROGENIC CLAUDICATION: ICD-10-CM

## 2025-02-12 DIAGNOSIS — J45.20 MILD INTERMITTENT ASTHMA WITHOUT COMPLICATION: ICD-10-CM

## 2025-02-12 DIAGNOSIS — E66.01 SEVERE OBESITY (BMI 35.0-39.9) WITH COMORBIDITY: ICD-10-CM

## 2025-02-12 LAB — HBA1C MFR BLD: 6.1 %

## 2025-02-12 PROCEDURE — 99214 OFFICE O/P EST MOD 30 MIN: CPT | Performed by: INTERNAL MEDICINE

## 2025-02-12 PROCEDURE — 3074F SYST BP LT 130 MM HG: CPT | Performed by: INTERNAL MEDICINE

## 2025-02-12 PROCEDURE — 3044F HG A1C LEVEL LT 7.0%: CPT | Performed by: INTERNAL MEDICINE

## 2025-02-12 PROCEDURE — 83036 HEMOGLOBIN GLYCOSYLATED A1C: CPT | Performed by: INTERNAL MEDICINE

## 2025-02-12 PROCEDURE — 3078F DIAST BP <80 MM HG: CPT | Performed by: INTERNAL MEDICINE

## 2025-02-12 ASSESSMENT — PATIENT HEALTH QUESTIONNAIRE - PHQ9
10. IF YOU CHECKED OFF ANY PROBLEMS, HOW DIFFICULT HAVE THESE PROBLEMS MADE IT FOR YOU TO DO YOUR WORK, TAKE CARE OF THINGS AT HOME, OR GET ALONG WITH OTHER PEOPLE: NOT DIFFICULT AT ALL
2. FEELING DOWN, DEPRESSED OR HOPELESS: NOT AT ALL
SUM OF ALL RESPONSES TO PHQ QUESTIONS 1-9: 5
3. TROUBLE FALLING OR STAYING ASLEEP: NOT AT ALL
SUM OF ALL RESPONSES TO PHQ9 QUESTIONS 1 & 2: 2
5. POOR APPETITE OR OVEREATING: NOT AT ALL
6. FEELING BAD ABOUT YOURSELF - OR THAT YOU ARE A FAILURE OR HAVE LET YOURSELF OR YOUR FAMILY DOWN: NOT AT ALL
4. FEELING TIRED OR HAVING LITTLE ENERGY: NOT AT ALL
1. LITTLE INTEREST OR PLEASURE IN DOING THINGS: MORE THAN HALF THE DAYS
9. THOUGHTS THAT YOU WOULD BE BETTER OFF DEAD, OR OF HURTING YOURSELF: NOT AT ALL
SUM OF ALL RESPONSES TO PHQ QUESTIONS 1-9: 5
SUM OF ALL RESPONSES TO PHQ QUESTIONS 1-9: 5
8. MOVING OR SPEAKING SO SLOWLY THAT OTHER PEOPLE COULD HAVE NOTICED. OR THE OPPOSITE, BEING SO FIGETY OR RESTLESS THAT YOU HAVE BEEN MOVING AROUND A LOT MORE THAN USUAL: NOT AT ALL
7. TROUBLE CONCENTRATING ON THINGS, SUCH AS READING THE NEWSPAPER OR WATCHING TELEVISION: NEARLY EVERY DAY
SUM OF ALL RESPONSES TO PHQ QUESTIONS 1-9: 5

## 2025-02-12 NOTE — PROGRESS NOTES
John Domingo presents today for   Chief Complaint   Patient presents with    Follow-up     6 mon f/u           \"Have you been to the ER, urgent care clinic since your last visit?  Hospitalized since your last visit?\"    NO    “Have you seen or consulted any other health care providers outside of [facility] since your last visit?”    NO        “Have you had a pap smear?”    YES - Where: [facility] [place] [facility]/CMA to request most recent records if not in the chart    Date of last Cervical Cancer screen (HPV or PAP): 2/4/2013

## 2025-02-14 RX ORDER — GABAPENTIN 300 MG/1
300 CAPSULE ORAL 3 TIMES DAILY
Qty: 90 CAPSULE | Refills: 2 | Status: SHIPPED | OUTPATIENT
Start: 2025-02-14 | End: 2025-05-15

## 2025-02-14 NOTE — PROGRESS NOTES
54 y.o. female who presents for evaluation.     Denies polyuria, polydipsia, nocturia, vision change.  Not checking her sugars routinely.  Remains on mounjaro but wt loss has stabilized at around 15% on max dosing      2023 2023 2023 2023 2023 4/15/2024   Vitals         Weight - Scale 246 lb  233 lb  225 lb   213 lb  219 lb 3.2 oz       5/15/2024 2024 10/31/2024 2025   Vitals       Weight - Scale 219 lb  221 lb  213 lb  209 lb 9.6 oz      No cardiovascular complaints. She is not exercising mainly due to motivational issues, she feels tired a lot as well.  Remains on geraldo per Dr Zaidi    No GI or  complaints.     Awaiting evaluation w neuro for possible neuropathy    Past Medical History:   Diagnosis Date    Acanthosis nigricans 2013    Allergic rhinitis     Anxiety     Asthma     Chronic anemia     beta thalassemia Dr ELYSIA Joy; iron def from menorrhagia    Colon polyp 10/18/2022    Dr Joy    COVID-19 vaccination declined     COVID-19 virus infection     regeneron (10/21); paxlovid ()    Depression     from deaths in family, got psychotherapy     DM (diabetes mellitus) (HCC) [id number]    History of CVA (cerebrovascular accident)     no deficits    Hyperlipidemia     Hypertension     Immunization declined     repeatedly flu, pvx, shingrix    Lumbar spinal stenosis     neurogenic claudication    Menorrhagia 2012    s/p endometrial ablation 3/16    Morbid obesity     dec yelitza supp, med sup wt loss, bariatrics; IF 3/18; mounjaro () start wt 246 lbs    Multiple thyroid nodules 2023    US showed mult subcm TR4 nodules (); R 4mm TR4/ R 9mm TR4/ R 1.2cm TR3/ L 4mm TR4/ L 5mm TR1 ()    SHELTON (obstructive sleep apnea)     Dr. Ontvieros-no cpap    Vitamin D deficiency 10/2011     Past Surgical History:   Procedure Laterality Date     SECTION      x2    COLONOSCOPY      Dr Joy (6/10) neg; (10/18/22) hyperplastic - 10yr    ENDOMETRIAL ABLATION

## 2025-03-10 RX ORDER — ERGOCALCIFEROL 1.25 MG/1
CAPSULE, LIQUID FILLED ORAL
Qty: 12 CAPSULE | Refills: 3 | Status: SHIPPED | OUTPATIENT
Start: 2025-03-10

## 2025-03-14 ENCOUNTER — TELEPHONE (OUTPATIENT)
Facility: CLINIC | Age: 55
End: 2025-03-14

## 2025-03-14 NOTE — TELEPHONE ENCOUNTER
Pt would like to know if we have received the notes from gynecology. She signed a release in their office to have them sent to us.   Please advise if we have those notes.

## 2025-04-16 DIAGNOSIS — R06.2 WHEEZING: ICD-10-CM

## 2025-04-16 DIAGNOSIS — J45.20 MILD INTERMITTENT ASTHMA WITHOUT COMPLICATION: ICD-10-CM

## 2025-04-16 RX ORDER — FLUTICASONE FUROATE AND VILANTEROL TRIFENATATE 100; 25 UG/1; UG/1
1 POWDER RESPIRATORY (INHALATION) DAILY
Qty: 60 EACH | Refills: 11 | Status: SHIPPED | OUTPATIENT
Start: 2025-04-16

## 2025-05-08 ENCOUNTER — CLINICAL DOCUMENTATION (OUTPATIENT)
Facility: CLINIC | Age: 55
End: 2025-05-08

## 2025-05-14 ENCOUNTER — PATIENT MESSAGE (OUTPATIENT)
Facility: CLINIC | Age: 55
End: 2025-05-14

## 2025-05-20 ENCOUNTER — OFFICE VISIT (OUTPATIENT)
Age: 55
End: 2025-05-20
Payer: COMMERCIAL

## 2025-05-20 VITALS
WEIGHT: 209 LBS | DIASTOLIC BLOOD PRESSURE: 82 MMHG | HEIGHT: 65 IN | SYSTOLIC BLOOD PRESSURE: 117 MMHG | BODY MASS INDEX: 34.82 KG/M2

## 2025-05-20 DIAGNOSIS — M65.311 TRIGGER THUMB OF RIGHT HAND: ICD-10-CM

## 2025-05-20 DIAGNOSIS — Z01.818 PREOP EXAMINATION: Primary | ICD-10-CM

## 2025-05-20 DIAGNOSIS — M65.311 TRIGGER THUMB OF RIGHT HAND: Primary | ICD-10-CM

## 2025-05-20 DIAGNOSIS — E11.9 TYPE 2 DIABETES MELLITUS WITHOUT COMPLICATION, WITHOUT LONG-TERM CURRENT USE OF INSULIN (HCC): ICD-10-CM

## 2025-05-20 PROCEDURE — 3074F SYST BP LT 130 MM HG: CPT

## 2025-05-20 PROCEDURE — 3079F DIAST BP 80-89 MM HG: CPT

## 2025-05-20 PROCEDURE — 99214 OFFICE O/P EST MOD 30 MIN: CPT

## 2025-05-20 NOTE — PROGRESS NOTES
desats 82%    Vitamin D deficiency 10/2011       Past Surgical History:   Procedure Laterality Date     SECTION      x2    COLONOSCOPY      Dr Joy (6/10) neg; (10/18/22) hyperplastic - 10yr    ENDOMETRIAL ABLATION  2016    Dr Sheldon/Su    ESOPHAGOGASTRODUODENOSCOPY  2010    neg EGD Dr Joy    GYN      cyst removal    LEEP          Current Outpatient Medications   Medication Sig Dispense Refill    BREO ELLIPTA 100-25 MCG/ACT inhaler Inhale 1 puff by mouth once daily 60 each 11    vitamin D (ERGOCALCIFEROL) 1.25 MG (85468 UT) CAPS capsule Take 1 capsule by mouth once a week 12 capsule 3    gabapentin (NEURONTIN) 300 MG capsule Take 1 capsule by mouth 3 times daily for 90 days. Max Daily Amount: 900 mg 90 capsule 2    Tirzepatide (MOUNJARO) 15 MG/0.5ML SOPN SC injection Inject 0.5 mLs into the skin once a week 6 mL 3    metFORMIN (GLUCOPHAGE-XR) 500 MG extended release tablet TAKE 2 TABLETS BY MOUTH ONCE DAILY WITH SUPPER 180 tablet 3    atorvastatin (LIPITOR) 80 MG tablet Take 1 tablet by mouth once daily 90 tablet 3    spironolactone (ALDACTONE) 25 MG tablet Take 1 tablet by mouth once daily 90 tablet 3    citalopram (CELEXA) 40 MG tablet Take 1 tablet by mouth once daily 90 tablet 3    potassium chloride (KLOR-CON M) 20 MEQ extended release tablet Take 1 tablet by mouth daily 90 tablet 3    candesartan (ATACAND) 16 MG tablet Take 1 tablet by mouth once daily 90 tablet 3    chlorthalidone (HYGROTON) 25 MG tablet Take 1 tablet by mouth once daily 90 tablet 3    amLODIPine (NORVASC) 5 MG tablet Take 1 tablet by mouth once daily 90 tablet 3    albuterol sulfate HFA (PROVENTIL;VENTOLIN;PROAIR) 108 (90 Base) MCG/ACT inhaler Inhale 1-2 puffs into the lungs every 4 hours as needed for Wheezing or Shortness of Breath 18 g 5    fluticasone (FLONASE) 50 MCG/ACT nasal spray 1 spray by Each Nostril route daily 16 g 11    acetaminophen (TYLENOL) 325 MG tablet Take 1 tablet by mouth every 6 hours as needed

## 2025-05-21 ENCOUNTER — TELEMEDICINE (OUTPATIENT)
Facility: CLINIC | Age: 55
End: 2025-05-21
Payer: COMMERCIAL

## 2025-05-21 DIAGNOSIS — J40 BRONCHITIS: Primary | ICD-10-CM

## 2025-05-21 DIAGNOSIS — J45.31 MILD PERSISTENT ASTHMA WITH EXACERBATION: ICD-10-CM

## 2025-05-21 DIAGNOSIS — R05.9 COUGH, UNSPECIFIED TYPE: ICD-10-CM

## 2025-05-21 PROCEDURE — 99213 OFFICE O/P EST LOW 20 MIN: CPT | Performed by: INTERNAL MEDICINE

## 2025-05-21 RX ORDER — AZITHROMYCIN 250 MG/1
TABLET, FILM COATED ORAL
Qty: 6 TABLET | Refills: 0 | Status: SHIPPED | OUTPATIENT
Start: 2025-05-21 | End: 2025-05-31

## 2025-05-21 RX ORDER — HYDROCODONE POLISTIREX AND CHLORPHENIRAMINE POLISTIREX 10; 8 MG/5ML; MG/5ML
5 SUSPENSION, EXTENDED RELEASE ORAL EVERY 12 HOURS PRN
Qty: 100 ML | Refills: 0 | Status: SHIPPED | OUTPATIENT
Start: 2025-05-21 | End: 2025-05-31

## 2025-05-21 RX ORDER — PREDNISONE 20 MG/1
20 TABLET ORAL 2 TIMES DAILY
Qty: 10 TABLET | Refills: 0 | Status: SHIPPED | OUTPATIENT
Start: 2025-05-21 | End: 2025-05-26

## 2025-05-21 NOTE — PROGRESS NOTES
John Domingo 54 y.o. who was seen by synchronous (real-time) audio-video technology for   Chief Complaint   Patient presents with    Cough    Wheezing             Assessment & Plan:      Diagnosis Orders   1. Bronchitis  azithromycin (ZITHROMAX) 250 MG tablet      2. Mild persistent asthma with exacerbation  predniSONE (DELTASONE) 20 MG tablet    HYDROcodone-chlorpheniramine (TUSSIONEX) 10-8 MG/5ML SUER      3. Cough, unspecified type  azithromycin (ZITHROMAX) 250 MG tablet    HYDROcodone-chlorpheniramine (TUSSIONEX) 10-8 MG/5ML SUER          712  Subjective:     She was doing well until 2 days ago when she noted onset of cough, increased wheezing, chest tightness and mild shortness of breath.  No known exposures; in the last day she started coughing up yellowish material.  Denies sinus symptoms, she does have sore throat, no ear pain, generalized achiness, GI complaints.  No known exposures.         Objective:     Past Medical History:   Diagnosis Date    Acanthosis nigricans 07/2013    Allergic rhinitis     Anxiety     Asthma     Chronic anemia 2012    beta thalassemia Dr ELYSIA Joy; iron def from menorrhagia    Colon polyp 10/18/2022    Dr Joy    COVID-19 vaccination declined     COVID-19 virus infection     regeneron (10/21); paxlovid (11/22)    Depression     from deaths in family, got psychotherapy 2022    DM (diabetes mellitus) ([facility]) [id number]    History of CVA (cerebrovascular accident) 1996    no deficits    Hyperlipidemia     Hypertension     Immunization declined     repeatedly flu, pvx, shingrix    Lumbar spinal stenosis     neurogenic claudication    Menorrhagia 03/2012    s/p endometrial ablation 3/16    Morbid obesity (HCC)     dec yelitza supp, med sup wt loss, bariatrics; IF 3/18; mounjaro (1/23) start wt 246 lbs    Multiple thyroid nodules 02/2023    US showed mult subcm TR4 nodules (2/23); R 4mm TR4/ R 9mm TR4/ R 1.2cm TR3/ L 4mm TR4/ L 5mm TR1 (7/24)    SHELTON (obstructive sleep apnea)

## 2025-06-27 ENCOUNTER — HOSPITAL ENCOUNTER (OUTPATIENT)
Age: 55
Setting detail: SPECIMEN
Discharge: HOME | End: 2025-06-30

## 2025-06-27 LAB
A/G RATIO: 1.4 RATIO (ref 1.1–2.6)
ALBUMIN: 4.6 G/DL (ref 3.5–5)
ALP BLD-CCNC: 102 U/L (ref 25–115)
ALT SERPL-CCNC: 18 U/L (ref 5–40)
ANION GAP SERPL CALCULATED.3IONS-SCNC: 15 MMOL/L (ref 3–15)
AST SERPL-CCNC: 17 U/L (ref 10–37)
BASOPHILS # BLD: 1 % (ref 0–2)
BASOPHILS ABSOLUTE: 0 K/UL (ref 0–0.2)
BILIRUB SERPL-MCNC: 0.5 MG/DL (ref 0.2–1.2)
BUN BLDV-MCNC: 21 MG/DL (ref 6–22)
CALCIUM SERPL-MCNC: 10.6 MG/DL (ref 8.4–10.5)
CHLORIDE BLD-SCNC: 100 MMOL/L (ref 98–110)
CO2: 24 MMOL/L (ref 20–32)
CREAT SERPL-MCNC: 0.8 MG/DL (ref 0.5–1.2)
EOSINOPHIL # BLD: 1 % (ref 0–6)
EOSINOPHILS ABSOLUTE: 0.1 K/UL (ref 0–0.5)
ESTIMATED AVERAGE GLUCOSE: 141 MG/DL (ref 91–123)
GFR, ESTIMATED: 81.3 ML/MIN/1.73 SQ.M.
GLOBULIN: 3.3 G/DL (ref 2–4)
GLUCOSE: 95 MG/DL (ref 70–99)
HBA1C MFR BLD: 6.5 % (ref 4.8–5.6)
HCT VFR BLD CALC: 40.4 % (ref 35.1–48)
HEMOGLOBIN: 11.7 G/DL (ref 11.7–16)
LYMPHOCYTES # BLD: 43 % (ref 20–45)
LYMPHOCYTES ABSOLUTE: 1.6 K/UL (ref 1–4.8)
MCH RBC QN AUTO: 22 PG (ref 26–34)
MCHC RBC AUTO-ENTMCNC: 29 G/DL (ref 31–36)
MCV RBC AUTO: 75 FL (ref 80–99)
MONOCYTES ABSOLUTE: 0.4 K/UL (ref 0.1–1)
MONOCYTES: 10 % (ref 3–12)
NEUTROPHILS ABSOLUTE: 1.7 K/UL (ref 1.8–7.7)
NEUTROPHILS SEGMENTED: 45 % (ref 40–75)
PDW BLD-RTO: 16.2 % (ref 10–15.5)
PLATELET # BLD: 300 K/UL (ref 140–440)
PMV BLD AUTO: 11.4 FL (ref 9–13)
POTASSIUM SERPL-SCNC: 4 MMOL/L (ref 3.5–5.5)
RBC # BLD: 5.39 M/UL (ref 3.8–5.2)
SENTARA SPECIMEN COLLECTION: NORMAL
SODIUM BLD-SCNC: 139 MMOL/L (ref 133–145)
TOTAL PROTEIN: 7.9 G/DL (ref 6.4–8.3)
WBC # BLD: 3.7 K/UL (ref 4–11)

## 2025-06-28 ENCOUNTER — PATIENT MESSAGE (OUTPATIENT)
Age: 55
End: 2025-06-28

## 2025-07-10 DIAGNOSIS — Z98.890 S/P TRIGGER FINGER RELEASE: ICD-10-CM

## 2025-07-10 DIAGNOSIS — M65.311 TRIGGER THUMB OF RIGHT HAND: Primary | ICD-10-CM

## 2025-07-10 RX ORDER — DICLOFENAC SODIUM 75 MG/1
75 TABLET, DELAYED RELEASE ORAL EVERY 12 HOURS PRN
Qty: 20 TABLET | Refills: 0 | Status: CANCELLED | OUTPATIENT
Start: 2025-07-10 | End: 2025-07-20

## 2025-07-10 RX ORDER — HYDROCODONE BITARTRATE AND ACETAMINOPHEN 5; 325 MG/1; MG/1
1 TABLET ORAL EVERY 6 HOURS PRN
Qty: 12 TABLET | Refills: 0 | Status: SHIPPED | OUTPATIENT
Start: 2025-07-10 | End: 2025-07-13

## 2025-07-24 ENCOUNTER — OFFICE VISIT (OUTPATIENT)
Age: 55
End: 2025-07-24

## 2025-07-24 VITALS — BODY MASS INDEX: 34.82 KG/M2 | HEIGHT: 65 IN | WEIGHT: 209 LBS

## 2025-07-24 DIAGNOSIS — Z98.890 S/P TRIGGER FINGER RELEASE: ICD-10-CM

## 2025-07-24 DIAGNOSIS — M65.311 TRIGGER THUMB OF RIGHT HAND: Primary | ICD-10-CM

## 2025-07-24 PROCEDURE — 99024 POSTOP FOLLOW-UP VISIT: CPT

## 2025-07-24 NOTE — PROGRESS NOTES
Findings: No bruising or erythema.   Neurological:      General: No focal deficit present.      Mental Status: She is alert and oriented to person, place, and time.   Psychiatric:         Mood and Affect: Mood normal.         Behavior: Behavior normal.        Right hand: Incision(s) clean dry and intact no drainage breakdown erythema or any signs of infection.  Neurovascularly intact distally.  Range of motion full.  Decompressed blister with hardened superficial skin on the radial aspect of the index finger.    Imaging:     None indicated      Impression     Diagnosis Orders   1. Trigger thumb of right hand        2. S/P trigger finger release            Plan:     Begin using scar cream or cocoa butter to massage directly into the scar to combat scar tissue formation.  We discussed the importance of scar care and desensitization.    If the wound appears red, hot, begins to drain or smell bad, please call the office or seek care at urgent care/ED.    Continue to work on range of motion exercises.    I suspect that the blister on the radial aspect of her index finger was secondary to the bandage on her thumb.    Return if symptoms worsen or fail to improve.     Plan was reviewed with patient, who verbalized agreement and understanding of the plan    Note: This note was completed using voice recognition software.  Any typographical/name errors or mistakes are unintentional.

## 2025-07-31 ENCOUNTER — HOSPITAL ENCOUNTER (OUTPATIENT)
Facility: HOSPITAL | Age: 55
Discharge: HOME | End: 2025-07-31
Attending: INTERNAL MEDICINE
Payer: COMMERCIAL

## 2025-07-31 VITALS — WEIGHT: 209 LBS | HEIGHT: 65 IN | BODY MASS INDEX: 34.82 KG/M2

## 2025-07-31 DIAGNOSIS — Z12.31 SCREENING MAMMOGRAM FOR BREAST CANCER: ICD-10-CM

## 2025-07-31 PROCEDURE — 77063 BREAST TOMOSYNTHESIS BI: CPT

## 2025-08-03 ENCOUNTER — RESULTS FOLLOW-UP (OUTPATIENT)
Facility: CLINIC | Age: 55
End: 2025-08-03

## 2025-08-03 DIAGNOSIS — R92.8 ABNORMAL MAMMOGRAM: Primary | ICD-10-CM

## 2025-08-14 ENCOUNTER — OFFICE VISIT (OUTPATIENT)
Age: 55
End: 2025-08-14
Payer: COMMERCIAL

## 2025-08-14 ENCOUNTER — HOSPITAL ENCOUNTER (OUTPATIENT)
Age: 55
Setting detail: SPECIMEN
Discharge: HOME | End: 2025-08-17

## 2025-08-14 VITALS
TEMPERATURE: 97.5 F | HEART RATE: 80 BPM | DIASTOLIC BLOOD PRESSURE: 84 MMHG | SYSTOLIC BLOOD PRESSURE: 135 MMHG | OXYGEN SATURATION: 100 % | HEIGHT: 64 IN | WEIGHT: 207 LBS | BODY MASS INDEX: 35.34 KG/M2

## 2025-08-14 DIAGNOSIS — R20.2 NUMBNESS AND TINGLING IN BOTH HANDS: Primary | ICD-10-CM

## 2025-08-14 DIAGNOSIS — M48.062 SPINAL STENOSIS OF LUMBAR REGION WITH NEUROGENIC CLAUDICATION: ICD-10-CM

## 2025-08-14 DIAGNOSIS — R20.0 NUMBNESS AND TINGLING IN BOTH HANDS: Primary | ICD-10-CM

## 2025-08-14 LAB
ANION GAP SERPL CALCULATED.3IONS-SCNC: 13 MMOL/L (ref 3–15)
BUN BLDV-MCNC: 13 MG/DL (ref 6–22)
CALCIUM SERPL-MCNC: 9.7 MG/DL (ref 8.4–10.5)
CHLORIDE BLD-SCNC: 101 MMOL/L (ref 98–110)
CHOLESTEROL, TOTAL: 155 MG/DL (ref 110–200)
CHOLESTEROL/HDL RATIO: 3.5 (ref 0–5)
CO2: 26 MMOL/L (ref 20–32)
CREAT SERPL-MCNC: 0.7 MG/DL (ref 0.5–1.2)
CREATININE, URINE  MG/DL: 142 MG/DL
ESTIMATED AVERAGE GLUCOSE: 131 MG/DL (ref 91–123)
GFR, ESTIMATED: >90 ML/MIN/1.73 SQ.M.
GLUCOSE: 119 MG/DL (ref 70–99)
HBA1C MFR BLD: 6.2 % (ref 4.8–5.6)
HDLC SERPL-MCNC: 44 MG/DL
LDL CHOLESTEROL: 90 MG/DL (ref 50–99)
LDL/HDL RATIO: 2
MICROALBUMIN/CREAT 24H UR: <12 MG/L (ref 0.1–17)
MICROALBUMIN/CREAT UR-RTO: NORMAL
NON-HDL CHOLESTEROL: 111 MG/DL
POTASSIUM SERPL-SCNC: 4 MMOL/L (ref 3.5–5.5)
SENTARA SPECIMEN COLLECTION: NORMAL
SODIUM BLD-SCNC: 140 MMOL/L (ref 133–145)
T4 FREE: 0.9 NG/DL (ref 0.9–1.8)
TRIGL SERPL-MCNC: 105 MG/DL (ref 40–149)
TSH SERPL DL<=0.05 MIU/L-ACNC: 1.49 MCU/ML (ref 0.27–4.2)
VLDLC SERPL CALC-MCNC: 21 MG/DL (ref 8–30)

## 2025-08-14 PROCEDURE — 3079F DIAST BP 80-89 MM HG: CPT | Performed by: STUDENT IN AN ORGANIZED HEALTH CARE EDUCATION/TRAINING PROGRAM

## 2025-08-14 PROCEDURE — 99204 OFFICE O/P NEW MOD 45 MIN: CPT | Performed by: STUDENT IN AN ORGANIZED HEALTH CARE EDUCATION/TRAINING PROGRAM

## 2025-08-14 PROCEDURE — 3075F SYST BP GE 130 - 139MM HG: CPT | Performed by: STUDENT IN AN ORGANIZED HEALTH CARE EDUCATION/TRAINING PROGRAM

## 2025-08-14 ASSESSMENT — ENCOUNTER SYMPTOMS
NAUSEA: 0
VOMITING: 0
BACK PAIN: 1
SHORTNESS OF BREATH: 0
COUGH: 0

## 2025-08-15 DIAGNOSIS — M54.2 CERVICAL PAIN: ICD-10-CM

## 2025-08-15 DIAGNOSIS — M48.062 SPINAL STENOSIS OF LUMBAR REGION WITH NEUROGENIC CLAUDICATION: ICD-10-CM

## 2025-08-15 RX ORDER — GABAPENTIN 300 MG/1
CAPSULE ORAL
Qty: 90 CAPSULE | Refills: 0 | Status: SHIPPED | OUTPATIENT
Start: 2025-08-15 | End: 2025-09-14

## 2025-08-20 ENCOUNTER — OFFICE VISIT (OUTPATIENT)
Facility: CLINIC | Age: 55
End: 2025-08-20
Payer: COMMERCIAL

## 2025-08-20 VITALS
HEART RATE: 100 BPM | HEIGHT: 64 IN | WEIGHT: 204 LBS | SYSTOLIC BLOOD PRESSURE: 124 MMHG | OXYGEN SATURATION: 99 % | TEMPERATURE: 97.8 F | DIASTOLIC BLOOD PRESSURE: 87 MMHG | BODY MASS INDEX: 34.83 KG/M2 | RESPIRATION RATE: 16 BRPM

## 2025-08-20 DIAGNOSIS — R20.2 NUMBNESS AND TINGLING IN BOTH HANDS: Primary | ICD-10-CM

## 2025-08-20 DIAGNOSIS — I10 PRIMARY HYPERTENSION: Primary | ICD-10-CM

## 2025-08-20 DIAGNOSIS — G47.33 OSA (OBSTRUCTIVE SLEEP APNEA): ICD-10-CM

## 2025-08-20 DIAGNOSIS — J45.20 MILD INTERMITTENT ASTHMA WITHOUT COMPLICATION: ICD-10-CM

## 2025-08-20 DIAGNOSIS — M48.061 SPINAL STENOSIS, LUMBAR REGION, WITHOUT NEUROGENIC CLAUDICATION: ICD-10-CM

## 2025-08-20 DIAGNOSIS — R20.0 NUMBNESS AND TINGLING IN BOTH HANDS: Primary | ICD-10-CM

## 2025-08-20 DIAGNOSIS — E78.5 DYSLIPIDEMIA: ICD-10-CM

## 2025-08-20 DIAGNOSIS — E11.9 TYPE 2 DIABETES MELLITUS WITHOUT COMPLICATION, WITHOUT LONG-TERM CURRENT USE OF INSULIN (HCC): ICD-10-CM

## 2025-08-20 DIAGNOSIS — D64.9 CHRONIC ANEMIA: ICD-10-CM

## 2025-08-20 DIAGNOSIS — E04.2 MULTIPLE THYROID NODULES: ICD-10-CM

## 2025-08-20 DIAGNOSIS — E66.01 SEVERE OBESITY (BMI 35.0-39.9) WITH COMORBIDITY (HCC): ICD-10-CM

## 2025-08-20 PROCEDURE — 3079F DIAST BP 80-89 MM HG: CPT | Performed by: INTERNAL MEDICINE

## 2025-08-20 PROCEDURE — 3044F HG A1C LEVEL LT 7.0%: CPT | Performed by: INTERNAL MEDICINE

## 2025-08-20 PROCEDURE — 3074F SYST BP LT 130 MM HG: CPT | Performed by: INTERNAL MEDICINE

## 2025-08-20 PROCEDURE — 99214 OFFICE O/P EST MOD 30 MIN: CPT | Performed by: INTERNAL MEDICINE

## 2025-08-21 ENCOUNTER — CLINICAL DOCUMENTATION (OUTPATIENT)
Age: 55
End: 2025-08-21

## 2025-08-28 DIAGNOSIS — I10 PRIMARY HYPERTENSION: ICD-10-CM

## 2025-08-28 RX ORDER — CANDESARTAN 16 MG/1
16 TABLET ORAL DAILY
Qty: 90 TABLET | Refills: 3 | Status: SHIPPED | OUTPATIENT
Start: 2025-08-28